# Patient Record
Sex: MALE | Race: WHITE | NOT HISPANIC OR LATINO | Employment: FULL TIME | URBAN - METROPOLITAN AREA
[De-identification: names, ages, dates, MRNs, and addresses within clinical notes are randomized per-mention and may not be internally consistent; named-entity substitution may affect disease eponyms.]

---

## 2020-12-23 ENCOUNTER — APPOINTMENT (OUTPATIENT)
Dept: CT IMAGING | Facility: HOSPITAL | Age: 40
End: 2020-12-23

## 2020-12-23 ENCOUNTER — HOSPITAL ENCOUNTER (EMERGENCY)
Facility: HOSPITAL | Age: 40
Discharge: HOME | End: 2020-12-23
Attending: EMERGENCY MEDICINE | Admitting: EMERGENCY MEDICINE

## 2020-12-23 VITALS
HEART RATE: 72 BPM | OXYGEN SATURATION: 96 % | WEIGHT: 236.55 LBS | RESPIRATION RATE: 16 BRPM | HEIGHT: 72 IN | DIASTOLIC BLOOD PRESSURE: 69 MMHG | TEMPERATURE: 97.8 F | SYSTOLIC BLOOD PRESSURE: 132 MMHG | BODY MASS INDEX: 32.04 KG/M2

## 2020-12-23 DIAGNOSIS — R10.9 FLANK PAIN: Primary | ICD-10-CM

## 2020-12-23 LAB
ALBUMIN SERPL-MCNC: 4.8 G/DL (ref 3.5–5.2)
ALBUMIN/GLOB SERPL: 2.2 G/DL
ALP SERPL-CCNC: 42 U/L (ref 39–117)
ALT SERPL W P-5'-P-CCNC: 16 U/L (ref 1–41)
ANION GAP SERPL CALCULATED.3IONS-SCNC: 10 MMOL/L (ref 5–15)
AST SERPL-CCNC: 14 U/L (ref 1–40)
BASOPHILS # BLD AUTO: 0 10*3/MM3 (ref 0–0.2)
BASOPHILS NFR BLD AUTO: 0.3 % (ref 0–1.5)
BILIRUB SERPL-MCNC: 0.8 MG/DL (ref 0–1.2)
BILIRUB UR QL STRIP: NEGATIVE
BUN SERPL-MCNC: 9 MG/DL (ref 6–20)
BUN/CREAT SERPL: 11 (ref 7–25)
CALCIUM SPEC-SCNC: 9.6 MG/DL (ref 8.6–10.5)
CHLORIDE SERPL-SCNC: 104 MMOL/L (ref 98–107)
CLARITY UR: CLEAR
CO2 SERPL-SCNC: 27 MMOL/L (ref 22–29)
COLOR UR: YELLOW
CREAT SERPL-MCNC: 0.82 MG/DL (ref 0.76–1.27)
DEPRECATED RDW RBC AUTO: 41.1 FL (ref 37–54)
EOSINOPHIL # BLD AUTO: 0 10*3/MM3 (ref 0–0.4)
EOSINOPHIL NFR BLD AUTO: 0.4 % (ref 0.3–6.2)
ERYTHROCYTE [DISTWIDTH] IN BLOOD BY AUTOMATED COUNT: 12.4 % (ref 12.3–15.4)
GFR SERPL CREATININE-BSD FRML MDRD: 105 ML/MIN/1.73
GLOBULIN UR ELPH-MCNC: 2.2 GM/DL
GLUCOSE SERPL-MCNC: 90 MG/DL (ref 65–99)
GLUCOSE UR STRIP-MCNC: NEGATIVE MG/DL
HCT VFR BLD AUTO: 45.5 % (ref 37.5–51)
HGB BLD-MCNC: 15.4 G/DL (ref 13–17.7)
HGB UR QL STRIP.AUTO: NEGATIVE
KETONES UR QL STRIP: NEGATIVE
LEUKOCYTE ESTERASE UR QL STRIP.AUTO: NEGATIVE
LYMPHOCYTES # BLD AUTO: 2.1 10*3/MM3 (ref 0.7–3.1)
LYMPHOCYTES NFR BLD AUTO: 21.3 % (ref 19.6–45.3)
MCH RBC QN AUTO: 32.8 PG (ref 26.6–33)
MCHC RBC AUTO-ENTMCNC: 33.8 G/DL (ref 31.5–35.7)
MCV RBC AUTO: 97.1 FL (ref 79–97)
MONOCYTES # BLD AUTO: 0.9 10*3/MM3 (ref 0.1–0.9)
MONOCYTES NFR BLD AUTO: 8.8 % (ref 5–12)
NEUTROPHILS NFR BLD AUTO: 6.8 10*3/MM3 (ref 1.7–7)
NEUTROPHILS NFR BLD AUTO: 69.2 % (ref 42.7–76)
NITRITE UR QL STRIP: NEGATIVE
NRBC BLD AUTO-RTO: 0.1 /100 WBC (ref 0–0.2)
PH UR STRIP.AUTO: 6.5 [PH] (ref 5–8)
PLATELET # BLD AUTO: 199 10*3/MM3 (ref 140–450)
PMV BLD AUTO: 8.8 FL (ref 6–12)
POTASSIUM SERPL-SCNC: 3.5 MMOL/L (ref 3.5–5.2)
PROT SERPL-MCNC: 7 G/DL (ref 6–8.5)
PROT UR QL STRIP: NEGATIVE
RBC # BLD AUTO: 4.69 10*6/MM3 (ref 4.14–5.8)
SODIUM SERPL-SCNC: 141 MMOL/L (ref 136–145)
SP GR UR STRIP: <=1.005 (ref 1–1.03)
UROBILINOGEN UR QL STRIP: NORMAL
WBC # BLD AUTO: 9.9 10*3/MM3 (ref 3.4–10.8)

## 2020-12-23 PROCEDURE — 25010000002 ONDANSETRON PER 1 MG: Performed by: EMERGENCY MEDICINE

## 2020-12-23 PROCEDURE — 25010000002 KETOROLAC TROMETHAMINE PER 15 MG: Performed by: EMERGENCY MEDICINE

## 2020-12-23 PROCEDURE — 96374 THER/PROPH/DIAG INJ IV PUSH: CPT

## 2020-12-23 PROCEDURE — 74176 CT ABD & PELVIS W/O CONTRAST: CPT

## 2020-12-23 PROCEDURE — 99283 EMERGENCY DEPT VISIT LOW MDM: CPT

## 2020-12-23 PROCEDURE — 81003 URINALYSIS AUTO W/O SCOPE: CPT | Performed by: EMERGENCY MEDICINE

## 2020-12-23 PROCEDURE — 85025 COMPLETE CBC W/AUTO DIFF WBC: CPT | Performed by: EMERGENCY MEDICINE

## 2020-12-23 PROCEDURE — 96375 TX/PRO/DX INJ NEW DRUG ADDON: CPT

## 2020-12-23 PROCEDURE — 80053 COMPREHEN METABOLIC PANEL: CPT | Performed by: EMERGENCY MEDICINE

## 2020-12-23 RX ORDER — ONDANSETRON 4 MG/1
4 TABLET, FILM COATED ORAL 4 TIMES DAILY
Qty: 10 TABLET | Refills: 0 | Status: SHIPPED | OUTPATIENT
Start: 2020-12-23

## 2020-12-23 RX ORDER — ONDANSETRON 2 MG/ML
4 INJECTION INTRAMUSCULAR; INTRAVENOUS ONCE
Status: COMPLETED | OUTPATIENT
Start: 2020-12-23 | End: 2020-12-23

## 2020-12-23 RX ORDER — KETOROLAC TROMETHAMINE 15 MG/ML
15 INJECTION, SOLUTION INTRAMUSCULAR; INTRAVENOUS ONCE
Status: COMPLETED | OUTPATIENT
Start: 2020-12-23 | End: 2020-12-23

## 2020-12-23 RX ORDER — MELOXICAM 15 MG/1
15 TABLET ORAL DAILY
Qty: 10 TABLET | Refills: 0 | Status: SHIPPED | OUTPATIENT
Start: 2020-12-23

## 2020-12-23 RX ORDER — SODIUM CHLORIDE 0.9 % (FLUSH) 0.9 %
10 SYRINGE (ML) INJECTION AS NEEDED
Status: DISCONTINUED | OUTPATIENT
Start: 2020-12-23 | End: 2020-12-23 | Stop reason: HOSPADM

## 2020-12-23 RX ADMIN — ONDANSETRON 4 MG: 2 INJECTION, SOLUTION INTRAMUSCULAR; INTRAVENOUS at 17:45

## 2020-12-23 RX ADMIN — KETOROLAC TROMETHAMINE 15 MG: 15 INJECTION, SOLUTION INTRAMUSCULAR; INTRAVENOUS at 17:45

## 2020-12-23 NOTE — ED PROVIDER NOTES
Subjective   History of Present Illness  Context: 40-year-old male presents with complaints of flank pain.  He states he has been having trouble for a couple weeks.  Has had some nausea but no vomiting.  He states he has had some difficulty with urination has been hard to go.  He has had no fever.  No cough or shortness of breath.  He was recently started back on Lexapro for anxiety.  He is having no cough or shortness of breath.     Location: Flank  Quality: Pain  Duration: 2 weeks  Timing: Constant  Severity: Severe today   Modifying factors: Feels better if he is standing  Associated signs and symptoms: States was hard to urinate    Review of Systems  Negative for fever cough shortness of breath  No past medical history on file.  DVT approximately 3 years ago anxiety  No Known Allergies    No past surgical history on file.    No family history on file.    Social History     Socioeconomic History   • Marital status:      Spouse name: Not on file   • Number of children: Not on file   • Years of education: Not on file   • Highest education level: Not on file     Current medication Lexapro      Objective   Physical Exam  40 year-old male awake alert.  Generally well-developed well-nourished.  Pupils equal round react light.  Neck supple.  Chest clear equal breath sounds cardiovascular regular rate and rhythm he complains of bilateral CVA tenderness.  Abdomen is soft without mass rebound or guarding.  Legs without tenderness edema.  He is ambulating without difficulty.  Procedures           ED Course      Results for orders placed or performed during the hospital encounter of 12/23/20   Comprehensive Metabolic Panel    Specimen: Blood   Result Value Ref Range    Glucose 90 65 - 99 mg/dL    BUN 9 6 - 20 mg/dL    Creatinine 0.82 0.76 - 1.27 mg/dL    Sodium 141 136 - 145 mmol/L    Potassium 3.5 3.5 - 5.2 mmol/L    Chloride 104 98 - 107 mmol/L    CO2 27.0 22.0 - 29.0 mmol/L    Calcium 9.6 8.6 - 10.5 mg/dL    Total  Protein 7.0 6.0 - 8.5 g/dL    Albumin 4.80 3.50 - 5.20 g/dL    ALT (SGPT) 16 1 - 41 U/L    AST (SGOT) 14 1 - 40 U/L    Alkaline Phosphatase 42 39 - 117 U/L    Total Bilirubin 0.8 0.0 - 1.2 mg/dL    eGFR Non African Amer 105 >60 mL/min/1.73    Globulin 2.2 gm/dL    A/G Ratio 2.2 g/dL    BUN/Creatinine Ratio 11.0 7.0 - 25.0    Anion Gap 10.0 5.0 - 15.0 mmol/L   Urinalysis With Microscopic If Indicated (No Culture) - Urine, Clean Catch    Specimen: Urine, Clean Catch   Result Value Ref Range    Color, UA Yellow Yellow, Straw    Appearance, UA Clear Clear    pH, UA 6.5 5.0 - 8.0    Specific Gravity, UA <=1.005 1.005 - 1.030    Glucose, UA Negative Negative    Ketones, UA Negative Negative    Bilirubin, UA Negative Negative    Blood, UA Negative Negative    Protein, UA Negative Negative    Leuk Esterase, UA Negative Negative    Nitrite, UA Negative Negative    Urobilinogen, UA 0.2 E.U./dL 0.2 - 1.0 E.U./dL   CBC Auto Differential    Specimen: Blood   Result Value Ref Range    WBC 9.90 3.40 - 10.80 10*3/mm3    RBC 4.69 4.14 - 5.80 10*6/mm3    Hemoglobin 15.4 13.0 - 17.7 g/dL    Hematocrit 45.5 37.5 - 51.0 %    MCV 97.1 (H) 79.0 - 97.0 fL    MCH 32.8 26.6 - 33.0 pg    MCHC 33.8 31.5 - 35.7 g/dL    RDW 12.4 12.3 - 15.4 %    RDW-SD 41.1 37.0 - 54.0 fl    MPV 8.8 6.0 - 12.0 fL    Platelets 199 140 - 450 10*3/mm3    Neutrophil % 69.2 42.7 - 76.0 %    Lymphocyte % 21.3 19.6 - 45.3 %    Monocyte % 8.8 5.0 - 12.0 %    Eosinophil % 0.4 0.3 - 6.2 %    Basophil % 0.3 0.0 - 1.5 %    Neutrophils, Absolute 6.80 1.70 - 7.00 10*3/mm3    Lymphocytes, Absolute 2.10 0.70 - 3.10 10*3/mm3    Monocytes, Absolute 0.90 0.10 - 0.90 10*3/mm3    Eosinophils, Absolute 0.00 0.00 - 0.40 10*3/mm3    Basophils, Absolute 0.00 0.00 - 0.20 10*3/mm3    nRBC 0.1 0.0 - 0.2 /100 WBC     Ct Abdomen Pelvis Without Contrast    Result Date: 12/23/2020  Normal CT of the abdomen and pelvis.    Electronically Signed By-Emiliano Carlson MD On:12/23/2020 7:26 PM This  "report was finalized on [id number] by  Emiliano Carlson MD.    Medications   sodium chloride 0.9 % flush 10 mL (has no administration in time range)   ondansetron (ZOFRAN) injection 4 mg (4 mg Intravenous Given 12/23/20 1745)   ketorolac (TORADOL) injection 15 mg (15 mg Intravenous Given 12/23/20 1745)     /69   Pulse 72   Temp 97.8 °F (36.6 °C) (Oral)   Resp 16   Ht 182.9 cm (72\")   Wt 107 kg (236 lb 8.9 oz)   SpO2 96%   BMI 32.08 kg/m²                                        MDM  Chart review: Patient was evaluated at immediate care on the 14th of this month for complaints of abdominal pain sharp in nature for approximately month.  She reported at that time some shortness of breath and some left-sided chest pain.  Also some upper abdominal pain for 2 weeks.  He was referred to [facility] ER at that time for further evaluation.  Comorbidity: As per past history  Differential: Musculoskeletal pain, UTI, renal colic,  My EKG interpretation:   Lab: Normal urinalysis normal comprehensive metabolic panel, normal CBC with exception of MCV of 97.1  Radiology: I reviewed CT scan of abdomen pelvis.  This was found to be normal.  Discussion/treatment: Patient IV placed.  He was given Zofran for nausea Toradol for pain.  Patient's findings were discussed with him.  He reports he has seen his PMD also.  He was noted to have had recent evaluation at [facility] on the 14th had negative CT chest PE protocol.  He had unremarkable laboratory evaluation at that time also.  Findings were discussed with him.  He was discharged.  Placed on Mobic.  He was given Zofran for nausea.  Encouraged to follow-up with his PMD next week for repeat evaluation.  Patient was evaluated using appropriate PPE      Final diagnoses:   Flank pain            Minor Morel MD  12/23/20 1954    "

## 2021-01-11 ENCOUNTER — OFFICE (AMBULATORY)
Dept: URBAN - METROPOLITAN AREA CLINIC 64 | Facility: CLINIC | Age: 41
End: 2021-01-11

## 2021-01-11 ENCOUNTER — TRANSCRIBE ORDERS (OUTPATIENT)
Dept: ADMINISTRATIVE | Facility: HOSPITAL | Age: 41
End: 2021-01-11

## 2021-01-11 VITALS — WEIGHT: 234 LBS | HEIGHT: 72 IN | SYSTOLIC BLOOD PRESSURE: 139 MMHG | DIASTOLIC BLOOD PRESSURE: 82 MMHG

## 2021-01-11 DIAGNOSIS — M54.6 PAIN IN THORACIC SPINE: ICD-10-CM

## 2021-01-11 DIAGNOSIS — R63.4 ABNORMAL WEIGHT LOSS: ICD-10-CM

## 2021-01-11 DIAGNOSIS — R19.4 CHANGE IN BOWEL HABIT: ICD-10-CM

## 2021-01-11 DIAGNOSIS — R10.11 RIGHT UPPER QUADRANT PAIN: Primary | ICD-10-CM

## 2021-01-11 DIAGNOSIS — R10.11 RIGHT UPPER QUADRANT PAIN: ICD-10-CM

## 2021-01-11 PROCEDURE — 99204 OFFICE O/P NEW MOD 45 MIN: CPT | Performed by: INTERNAL MEDICINE

## 2021-01-14 ENCOUNTER — ON CAMPUS - OUTPATIENT (AMBULATORY)
Dept: URBAN - METROPOLITAN AREA HOSPITAL 2 | Facility: HOSPITAL | Age: 41
End: 2021-01-14
Payer: COMMERCIAL

## 2021-01-14 ENCOUNTER — OFFICE (AMBULATORY)
Dept: URBAN - METROPOLITAN AREA PATHOLOGY 4 | Facility: PATHOLOGY | Age: 41
End: 2021-01-14

## 2021-01-14 VITALS
TEMPERATURE: 98.2 F | SYSTOLIC BLOOD PRESSURE: 118 MMHG | SYSTOLIC BLOOD PRESSURE: 156 MMHG | SYSTOLIC BLOOD PRESSURE: 122 MMHG | OXYGEN SATURATION: 100 % | HEART RATE: 64 BPM | SYSTOLIC BLOOD PRESSURE: 111 MMHG | DIASTOLIC BLOOD PRESSURE: 107 MMHG | SYSTOLIC BLOOD PRESSURE: 107 MMHG | DIASTOLIC BLOOD PRESSURE: 76 MMHG | HEART RATE: 65 BPM | OXYGEN SATURATION: 96 % | HEART RATE: 88 BPM | DIASTOLIC BLOOD PRESSURE: 46 MMHG | RESPIRATION RATE: 16 BRPM | WEIGHT: 229 LBS | SYSTOLIC BLOOD PRESSURE: 130 MMHG | SYSTOLIC BLOOD PRESSURE: 146 MMHG | HEART RATE: 67 BPM | RESPIRATION RATE: 18 BRPM | HEART RATE: 55 BPM | OXYGEN SATURATION: 99 % | OXYGEN SATURATION: 97 % | HEIGHT: 72 IN | DIASTOLIC BLOOD PRESSURE: 86 MMHG | OXYGEN SATURATION: 98 % | DIASTOLIC BLOOD PRESSURE: 67 MMHG | DIASTOLIC BLOOD PRESSURE: 70 MMHG | DIASTOLIC BLOOD PRESSURE: 72 MMHG | HEART RATE: 63 BPM

## 2021-01-14 DIAGNOSIS — R63.4 ABNORMAL WEIGHT LOSS: ICD-10-CM

## 2021-01-14 DIAGNOSIS — R19.4 CHANGE IN BOWEL HABIT: ICD-10-CM

## 2021-01-14 DIAGNOSIS — K21.00 GASTRO-ESOPHAGEAL REFLUX DISEASE WITH ESOPHAGITIS, WITHOUT B: ICD-10-CM

## 2021-01-14 DIAGNOSIS — K64.0 FIRST DEGREE HEMORRHOIDS: ICD-10-CM

## 2021-01-14 DIAGNOSIS — K29.60 OTHER GASTRITIS WITHOUT BLEEDING: ICD-10-CM

## 2021-01-14 DIAGNOSIS — M54.6 PAIN IN THORACIC SPINE: ICD-10-CM

## 2021-01-14 DIAGNOSIS — R10.11 RIGHT UPPER QUADRANT PAIN: ICD-10-CM

## 2021-01-14 DIAGNOSIS — K31.89 OTHER DISEASES OF STOMACH AND DUODENUM: ICD-10-CM

## 2021-01-14 PROBLEM — K20.80 OTHER ESOPHAGITIS WITHOUT BLEEDING: Status: ACTIVE | Noted: 2021-01-14

## 2021-01-14 PROBLEM — K22.8 OTHER SPECIFIED DISEASES OF ESOPHAGUS: Status: ACTIVE | Noted: 2021-01-14

## 2021-01-14 LAB
GI HISTOLOGY: A. UNSPECIFIED: (no result)
GI HISTOLOGY: B. SELECT: (no result)
GI HISTOLOGY: C. SELECT: (no result)
GI HISTOLOGY: D. UNSPECIFIED: (no result)
GI HISTOLOGY: PDF REPORT: (no result)

## 2021-01-14 PROCEDURE — 88305 TISSUE EXAM BY PATHOLOGIST: CPT | Performed by: INTERNAL MEDICINE

## 2021-01-14 PROCEDURE — 43239 EGD BIOPSY SINGLE/MULTIPLE: CPT | Performed by: INTERNAL MEDICINE

## 2021-01-14 PROCEDURE — 45380 COLONOSCOPY AND BIOPSY: CPT | Performed by: INTERNAL MEDICINE

## 2021-01-21 ENCOUNTER — TRANSCRIBE ORDERS (OUTPATIENT)
Dept: ADMINISTRATIVE | Facility: HOSPITAL | Age: 41
End: 2021-01-21

## 2021-01-21 DIAGNOSIS — R63.4 ABNORMAL WEIGHT LOSS: ICD-10-CM

## 2021-01-21 DIAGNOSIS — R10.11 ABDOMINAL PAIN, RIGHT UPPER QUADRANT: Primary | ICD-10-CM

## 2021-01-21 DIAGNOSIS — R19.4 CHANGE IN BOWEL HABITS: ICD-10-CM

## 2021-01-21 DIAGNOSIS — M54.6 THORACIC SPINE PAIN: ICD-10-CM

## 2021-01-27 ENCOUNTER — HOSPITAL ENCOUNTER (OUTPATIENT)
Dept: NUCLEAR MEDICINE | Facility: HOSPITAL | Age: 41
Discharge: HOME | End: 2021-01-27

## 2021-01-27 DIAGNOSIS — R63.4 ABNORMAL WEIGHT LOSS: ICD-10-CM

## 2021-01-27 DIAGNOSIS — R10.11 RIGHT UPPER QUADRANT PAIN: ICD-10-CM

## 2021-01-27 PROCEDURE — A9537 TC99M MEBROFENIN: HCPCS | Performed by: INTERNAL MEDICINE

## 2021-01-27 PROCEDURE — 78227 HEPATOBIL SYST IMAGE W/DRUG: CPT

## 2021-01-27 PROCEDURE — 0 TECHNETIUM TC 99M MEBROFENIN KIT: Performed by: INTERNAL MEDICINE

## 2021-01-27 RX ORDER — KIT FOR THE PREPARATION OF TECHNETIUM TC 99M MEBROFENIN 45 MG/10ML
1 INJECTION, POWDER, LYOPHILIZED, FOR SOLUTION INTRAVENOUS
Status: COMPLETED | OUTPATIENT
Start: 2021-01-27 | End: 2021-01-27

## 2021-01-27 RX ADMIN — MEBROFENIN 1 DOSE: 45 INJECTION, POWDER, LYOPHILIZED, FOR SOLUTION INTRAVENOUS at 07:45

## 2021-02-10 ENCOUNTER — OFFICE (AMBULATORY)
Dept: URBAN - METROPOLITAN AREA CLINIC 64 | Facility: CLINIC | Age: 41
End: 2021-02-10

## 2021-02-10 VITALS
HEART RATE: 49 BPM | WEIGHT: 259 LBS | DIASTOLIC BLOOD PRESSURE: 72 MMHG | SYSTOLIC BLOOD PRESSURE: 129 MMHG | HEIGHT: 72 IN

## 2021-02-10 DIAGNOSIS — R19.4 CHANGE IN BOWEL HABIT: ICD-10-CM

## 2021-02-10 DIAGNOSIS — R63.4 ABNORMAL WEIGHT LOSS: ICD-10-CM

## 2021-02-10 DIAGNOSIS — K29.70 GASTRITIS, UNSPECIFIED, WITHOUT BLEEDING: ICD-10-CM

## 2021-02-10 DIAGNOSIS — K21.00 GASTRO-ESOPHAGEAL REFLUX DISEASE WITH ESOPHAGITIS, WITHOUT B: ICD-10-CM

## 2021-02-10 PROCEDURE — 99213 OFFICE O/P EST LOW 20 MIN: CPT | Performed by: NURSE PRACTITIONER

## 2024-04-12 ENCOUNTER — HOSPITAL ENCOUNTER (EMERGENCY)
Facility: HOSPITAL | Age: 44
Discharge: HOME | End: 2024-04-12
Attending: EMERGENCY MEDICINE
Payer: COMMERCIAL

## 2024-04-12 ENCOUNTER — APPOINTMENT (OUTPATIENT)
Dept: CT IMAGING | Facility: HOSPITAL | Age: 44
End: 2024-04-12
Payer: COMMERCIAL

## 2024-04-12 VITALS
BODY MASS INDEX: 32.83 KG/M2 | HEIGHT: 71 IN | HEART RATE: 87 BPM | DIASTOLIC BLOOD PRESSURE: 79 MMHG | RESPIRATION RATE: 18 BRPM | TEMPERATURE: 98.8 F | SYSTOLIC BLOOD PRESSURE: 148 MMHG | OXYGEN SATURATION: 100 % | WEIGHT: 234.5 LBS

## 2024-04-12 DIAGNOSIS — R10.32 LEFT LOWER QUADRANT ABDOMINAL PAIN: Primary | ICD-10-CM

## 2024-04-12 LAB
BILIRUB UR QL STRIP: NEGATIVE
CLARITY UR: CLEAR
COLOR UR: YELLOW
GLUCOSE UR STRIP-MCNC: NEGATIVE MG/DL
HGB UR QL STRIP.AUTO: NEGATIVE
KETONES UR QL STRIP: NEGATIVE
LEUKOCYTE ESTERASE UR QL STRIP.AUTO: NEGATIVE
NITRITE UR QL STRIP: NEGATIVE
PH UR STRIP.AUTO: 5.5 [PH] (ref 5–8)
PROT UR QL STRIP: NEGATIVE
SP GR UR STRIP: 1.02 (ref 1–1.03)
UROBILINOGEN UR QL STRIP: NORMAL

## 2024-04-12 PROCEDURE — 81003 URINALYSIS AUTO W/O SCOPE: CPT | Performed by: EMERGENCY MEDICINE

## 2024-04-12 PROCEDURE — 25010000002 KETOROLAC TROMETHAMINE PER 15 MG: Performed by: EMERGENCY MEDICINE

## 2024-04-12 PROCEDURE — 99284 EMERGENCY DEPT VISIT MOD MDM: CPT

## 2024-04-12 PROCEDURE — 74176 CT ABD & PELVIS W/O CONTRAST: CPT

## 2024-04-12 PROCEDURE — 99283 EMERGENCY DEPT VISIT LOW MDM: CPT | Performed by: EMERGENCY MEDICINE

## 2024-04-12 PROCEDURE — 96372 THER/PROPH/DIAG INJ SC/IM: CPT

## 2024-04-12 RX ORDER — KETOROLAC TROMETHAMINE 30 MG/ML
30 INJECTION, SOLUTION INTRAMUSCULAR; INTRAVENOUS ONCE
Status: COMPLETED | OUTPATIENT
Start: 2024-04-12 | End: 2024-04-12

## 2024-04-12 RX ADMIN — KETOROLAC TROMETHAMINE 30 MG: 30 INJECTION, SOLUTION INTRAMUSCULAR at 17:10

## 2024-04-12 NOTE — FSED PROVIDER NOTE
Subjective   History of Present Illness  44-year-old male complains of left lower quadrant pain x 1 week says he has had some blood in the urine intermittently, says the pain feels sharp like it stabbing in the left lower quadrant.  Earlier in the week he had some pain in the right flank but no right-sided abdominal pain.  No fevers or chills no significant pain currently.  Review of Systems   Gastrointestinal:  Positive for abdominal pain.   Genitourinary:  Positive for flank pain and hematuria.       History reviewed. No pertinent past medical history.    No Known Allergies    History reviewed. No pertinent surgical history.    History reviewed. No pertinent family history.    Social History     Socioeconomic History    Marital status:    Tobacco Use    Smoking status: Some Days     Types: Cigars   Vaping Use    Vaping status: Never Used   Substance and Sexual Activity    Alcohol use: Not Currently    Drug use: Yes     Types: Marijuana     Comment: occ    Sexual activity: Defer           Objective   Physical Exam  Nursing note reviewed.  INITIAL VITAL SIGNS: Reviewed by me.  Pulse ox normal  GENERAL: Alert and interactive. No acute distress.  HEAD: Head is normocephalic.  RESPIRATORY: No tachypnea.   CV: Regular rate and rhythm. No murmurs. No rubs or gallops.  ABDOMEN: Soft, nondistended, nontender. No guarding. No rebound. No masses.   BACK:  No obvious deformity.  No CVA tenderness to percussion  EXTREMITIES: No deformity. No clubbing or cyanosis. No edema.   SKIN: Warm and dry. No diaphoresis. No obvious rashes.   NEUROLOGIC: Alert and oriented. Face is symmetric. Speech is normal. Moves all extremities equally. Motor and sensory distally intact.       Procedures           ED Course                                           Medical Decision Making  Amount and/or Complexity of Data Reviewed  Radiology: ordered.    Risk  Prescription drug management.    Patient with some left lower quadrant abdominal  pain.  No fevers no chills no nausea vomiting no constipation or diarrhea noted dysuria but has had some hematuria.  Says it feels sharp and sometimes radiates towards the penis or testicles.  On exam he is in no distress has soft nontender abdomen no CVA tenderness.  Urinalysis is normal and CT scan shows normal prostate no evidence for stone.  Discussed findings with patient, he is feeling somewhat better after Toradol will discharge with primary follow-up.    Final diagnoses:   Left lower quadrant abdominal pain       ED Disposition  ED Disposition       ED Disposition   Discharge    Condition   Good    Comment   --               Head, Tom Benavidez MD  [address] [place] [address] [place] [facility]  [phone]    Call   To schedule follow-up appointment         Medication List      No changes were made to your prescriptions during this visit.

## 2024-04-12 NOTE — DISCHARGE INSTRUCTIONS
Take ibuprofen and Tylenol according to label instructions.  Drink plenty of fluids.  Follow-up with your doctor.  Return to the emergency room if you have worsening symptoms.

## 2024-07-31 ENCOUNTER — OFFICE (AMBULATORY)
Dept: URBAN - METROPOLITAN AREA CLINIC 64 | Facility: CLINIC | Age: 44
End: 2024-07-31

## 2024-07-31 VITALS
HEIGHT: 72 IN | SYSTOLIC BLOOD PRESSURE: 121 MMHG | HEART RATE: 65 BPM | WEIGHT: 213 LBS | DIASTOLIC BLOOD PRESSURE: 76 MMHG

## 2024-07-31 DIAGNOSIS — R11.0 NAUSEA: ICD-10-CM

## 2024-07-31 DIAGNOSIS — R63.4 ABNORMAL WEIGHT LOSS: ICD-10-CM

## 2024-07-31 DIAGNOSIS — K62.5 HEMORRHAGE OF ANUS AND RECTUM: ICD-10-CM

## 2024-07-31 DIAGNOSIS — R07.9 CHEST PAIN, UNSPECIFIED: ICD-10-CM

## 2024-07-31 PROCEDURE — 99214 OFFICE O/P EST MOD 30 MIN: CPT | Performed by: NURSE PRACTITIONER

## 2024-08-10 ENCOUNTER — APPOINTMENT (OUTPATIENT)
Dept: CT IMAGING | Facility: HOSPITAL | Age: 44
End: 2024-08-10
Payer: COMMERCIAL

## 2024-08-10 ENCOUNTER — APPOINTMENT (OUTPATIENT)
Dept: GENERAL RADIOLOGY | Facility: HOSPITAL | Age: 44
End: 2024-08-10
Payer: COMMERCIAL

## 2024-08-10 ENCOUNTER — HOSPITAL ENCOUNTER (OUTPATIENT)
Facility: HOSPITAL | Age: 44
Discharge: HOME | End: 2024-08-13
Attending: EMERGENCY MEDICINE | Admitting: INTERNAL MEDICINE
Payer: COMMERCIAL

## 2024-08-10 DIAGNOSIS — R06.00 DYSPNEA, UNSPECIFIED TYPE: Primary | ICD-10-CM

## 2024-08-10 DIAGNOSIS — K21.9 GASTROESOPHAGEAL REFLUX DISEASE WITHOUT ESOPHAGITIS: ICD-10-CM

## 2024-08-10 DIAGNOSIS — I26.99 OTHER ACUTE PULMONARY EMBOLISM WITHOUT ACUTE COR PULMONALE: ICD-10-CM

## 2024-08-10 LAB
ALBUMIN SERPL-MCNC: 4.5 G/DL (ref 3.5–5.2)
ALBUMIN/GLOB SERPL: 1.9 G/DL
ALP SERPL-CCNC: 44 U/L (ref 39–117)
ALT SERPL W P-5'-P-CCNC: 20 U/L (ref 1–41)
ANION GAP SERPL CALCULATED.3IONS-SCNC: 11 MMOL/L (ref 5–15)
ANION GAP SERPL CALCULATED.3IONS-SCNC: 9.8 MMOL/L (ref 5–15)
APTT PPP: 26.8 SECONDS (ref 61–76.5)
AST SERPL-CCNC: 21 U/L (ref 1–40)
BASOPHILS # BLD AUTO: 0.03 10*3/MM3 (ref 0–0.2)
BASOPHILS NFR BLD AUTO: 0.4 % (ref 0–1.5)
BILIRUB SERPL-MCNC: 1 MG/DL (ref 0–1.2)
BUN SERPL-MCNC: 15 MG/DL (ref 6–20)
BUN SERPL-MCNC: 17 MG/DL (ref 6–20)
BUN/CREAT SERPL: 17 (ref 7–25)
BUN/CREAT SERPL: 17.5 (ref 7–25)
CALCIUM SPEC-SCNC: 9.4 MG/DL (ref 8.6–10.5)
CALCIUM SPEC-SCNC: 9.7 MG/DL (ref 8.6–10.5)
CHLORIDE SERPL-SCNC: 102 MMOL/L (ref 98–107)
CHLORIDE SERPL-SCNC: 104 MMOL/L (ref 98–107)
CO2 SERPL-SCNC: 25 MMOL/L (ref 22–29)
CO2 SERPL-SCNC: 25.2 MMOL/L (ref 22–29)
CREAT SERPL-MCNC: 0.88 MG/DL (ref 0.76–1.27)
CREAT SERPL-MCNC: 0.97 MG/DL (ref 0.76–1.27)
DEPRECATED RDW RBC AUTO: 42.5 FL (ref 37–54)
DEPRECATED RDW RBC AUTO: 42.8 FL (ref 37–54)
EGFRCR SERPLBLD CKD-EPI 2021: 100 ML/MIN/1.73
EGFRCR SERPLBLD CKD-EPI 2021: 110.1 ML/MIN/1.73
EOSINOPHIL # BLD AUTO: 0.08 10*3/MM3 (ref 0–0.4)
EOSINOPHIL NFR BLD AUTO: 1.1 % (ref 0.3–6.2)
ERYTHROCYTE [DISTWIDTH] IN BLOOD BY AUTOMATED COUNT: 11.6 % (ref 12.3–15.4)
ERYTHROCYTE [DISTWIDTH] IN BLOOD BY AUTOMATED COUNT: 11.7 % (ref 12.3–15.4)
GEN 5 2HR TROPONIN T REFLEX: 7 NG/L
GLOBULIN UR ELPH-MCNC: 2.4 GM/DL
GLUCOSE SERPL-MCNC: 191 MG/DL (ref 65–99)
GLUCOSE SERPL-MCNC: 93 MG/DL (ref 65–99)
HCT VFR BLD AUTO: 44.7 % (ref 37.5–51)
HCT VFR BLD AUTO: 45.7 % (ref 37.5–51)
HGB BLD-MCNC: 14.9 G/DL (ref 13–17.7)
HGB BLD-MCNC: 15.2 G/DL (ref 13–17.7)
IMM GRANULOCYTES # BLD AUTO: 0.02 10*3/MM3 (ref 0–0.05)
IMM GRANULOCYTES NFR BLD AUTO: 0.3 % (ref 0–0.5)
INR PPP: 0.96 (ref 0.93–1.1)
LYMPHOCYTES # BLD AUTO: 1.87 10*3/MM3 (ref 0.7–3.1)
LYMPHOCYTES NFR BLD AUTO: 25.2 % (ref 19.6–45.3)
MAGNESIUM SERPL-MCNC: 2.2 MG/DL (ref 1.6–2.6)
MCH RBC QN AUTO: 32.8 PG (ref 26.6–33)
MCH RBC QN AUTO: 33.1 PG (ref 26.6–33)
MCHC RBC AUTO-ENTMCNC: 33.3 G/DL (ref 31.5–35.7)
MCHC RBC AUTO-ENTMCNC: 33.3 G/DL (ref 31.5–35.7)
MCV RBC AUTO: 98.7 FL (ref 79–97)
MCV RBC AUTO: 99.3 FL (ref 79–97)
MONOCYTES # BLD AUTO: 0.69 10*3/MM3 (ref 0.1–0.9)
MONOCYTES NFR BLD AUTO: 9.3 % (ref 5–12)
NEUTROPHILS NFR BLD AUTO: 4.73 10*3/MM3 (ref 1.7–7)
NEUTROPHILS NFR BLD AUTO: 63.7 % (ref 42.7–76)
NRBC BLD AUTO-RTO: 0 /100 WBC (ref 0–0.2)
NT-PROBNP SERPL-MCNC: <36 PG/ML (ref 0–450)
PLATELET # BLD AUTO: 209 10*3/MM3 (ref 140–450)
PLATELET # BLD AUTO: 235 10*3/MM3 (ref 140–450)
PMV BLD AUTO: 10 FL (ref 6–12)
PMV BLD AUTO: 10 FL (ref 6–12)
POTASSIUM SERPL-SCNC: 4.4 MMOL/L (ref 3.5–5.2)
POTASSIUM SERPL-SCNC: 4.5 MMOL/L (ref 3.5–5.2)
PROT SERPL-MCNC: 6.9 G/DL (ref 6–8.5)
PROTHROMBIN TIME: 10.5 SECONDS (ref 9.6–11.7)
RBC # BLD AUTO: 4.5 10*6/MM3 (ref 4.14–5.8)
RBC # BLD AUTO: 4.63 10*6/MM3 (ref 4.14–5.8)
SODIUM SERPL-SCNC: 138 MMOL/L (ref 136–145)
SODIUM SERPL-SCNC: 139 MMOL/L (ref 136–145)
TROPONIN T DELTA: 0 NG/L
TROPONIN T SERPL HS-MCNC: 7 NG/L
WBC NRBC COR # BLD AUTO: 7.42 10*3/MM3 (ref 3.4–10.8)
WBC NRBC COR # BLD AUTO: 9.16 10*3/MM3 (ref 3.4–10.8)

## 2024-08-10 PROCEDURE — 85025 COMPLETE CBC W/AUTO DIFF WBC: CPT | Performed by: EMERGENCY MEDICINE

## 2024-08-10 PROCEDURE — G0378 HOSPITAL OBSERVATION PER HR: HCPCS

## 2024-08-10 PROCEDURE — 80053 COMPREHEN METABOLIC PANEL: CPT | Performed by: EMERGENCY MEDICINE

## 2024-08-10 PROCEDURE — 71275 CT ANGIOGRAPHY CHEST: CPT

## 2024-08-10 PROCEDURE — 25010000002 ENOXAPARIN PER 10 MG

## 2024-08-10 PROCEDURE — 84484 ASSAY OF TROPONIN QUANT: CPT | Performed by: EMERGENCY MEDICINE

## 2024-08-10 PROCEDURE — 94664 DEMO&/EVAL PT USE INHALER: CPT

## 2024-08-10 PROCEDURE — 94761 N-INVAS EAR/PLS OXIMETRY MLT: CPT

## 2024-08-10 PROCEDURE — 83880 ASSAY OF NATRIURETIC PEPTIDE: CPT | Performed by: EMERGENCY MEDICINE

## 2024-08-10 PROCEDURE — 94640 AIRWAY INHALATION TREATMENT: CPT

## 2024-08-10 PROCEDURE — 94799 UNLISTED PULMONARY SVC/PX: CPT

## 2024-08-10 PROCEDURE — 85027 COMPLETE CBC AUTOMATED: CPT

## 2024-08-10 PROCEDURE — 96374 THER/PROPH/DIAG INJ IV PUSH: CPT

## 2024-08-10 PROCEDURE — 96372 THER/PROPH/DIAG INJ SC/IM: CPT

## 2024-08-10 PROCEDURE — 25510000001 IOPAMIDOL PER 1 ML: Performed by: EMERGENCY MEDICINE

## 2024-08-10 PROCEDURE — 83735 ASSAY OF MAGNESIUM: CPT

## 2024-08-10 PROCEDURE — 85730 THROMBOPLASTIN TIME PARTIAL: CPT | Performed by: EMERGENCY MEDICINE

## 2024-08-10 PROCEDURE — 99285 EMERGENCY DEPT VISIT HI MDM: CPT

## 2024-08-10 PROCEDURE — 25010000002 METHYLPREDNISOLONE PER 125 MG: Performed by: EMERGENCY MEDICINE

## 2024-08-10 PROCEDURE — 71045 X-RAY EXAM CHEST 1 VIEW: CPT

## 2024-08-10 PROCEDURE — 85610 PROTHROMBIN TIME: CPT | Performed by: EMERGENCY MEDICINE

## 2024-08-10 PROCEDURE — 36415 COLL VENOUS BLD VENIPUNCTURE: CPT

## 2024-08-10 RX ORDER — ESCITALOPRAM OXALATE 10 MG/1
10 TABLET ORAL DAILY
Status: DISCONTINUED | OUTPATIENT
Start: 2024-08-11 | End: 2024-08-13 | Stop reason: HOSPADM

## 2024-08-10 RX ORDER — SODIUM CHLORIDE 0.9 % (FLUSH) 0.9 %
10 SYRINGE (ML) INJECTION AS NEEDED
Status: DISCONTINUED | OUTPATIENT
Start: 2024-08-10 | End: 2024-08-10

## 2024-08-10 RX ORDER — IPRATROPIUM BROMIDE AND ALBUTEROL SULFATE 2.5; .5 MG/3ML; MG/3ML
3 SOLUTION RESPIRATORY (INHALATION) ONCE
Status: COMPLETED | OUTPATIENT
Start: 2024-08-10 | End: 2024-08-10

## 2024-08-10 RX ORDER — SODIUM CHLORIDE 9 MG/ML
40 INJECTION, SOLUTION INTRAVENOUS AS NEEDED
Status: DISCONTINUED | OUTPATIENT
Start: 2024-08-10 | End: 2024-08-13 | Stop reason: HOSPADM

## 2024-08-10 RX ORDER — SODIUM CHLORIDE 0.9 % (FLUSH) 0.9 %
10 SYRINGE (ML) INJECTION EVERY 12 HOURS SCHEDULED
Status: DISCONTINUED | OUTPATIENT
Start: 2024-08-10 | End: 2024-08-13 | Stop reason: HOSPADM

## 2024-08-10 RX ORDER — METHYLPREDNISOLONE SODIUM SUCCINATE 125 MG/2ML
125 INJECTION, POWDER, LYOPHILIZED, FOR SOLUTION INTRAMUSCULAR; INTRAVENOUS ONCE
Status: COMPLETED | OUTPATIENT
Start: 2024-08-10 | End: 2024-08-10

## 2024-08-10 RX ORDER — ESCITALOPRAM OXALATE 10 MG/1
10 TABLET ORAL DAILY
COMMUNITY

## 2024-08-10 RX ORDER — ENOXAPARIN SODIUM 150 MG/ML
1 INJECTION SUBCUTANEOUS EVERY 12 HOURS
Status: DISCONTINUED | OUTPATIENT
Start: 2024-08-10 | End: 2024-08-11

## 2024-08-10 RX ORDER — PANTOPRAZOLE SODIUM 40 MG/1
40 TABLET, DELAYED RELEASE ORAL DAILY
Status: DISCONTINUED | OUTPATIENT
Start: 2024-08-11 | End: 2024-08-11

## 2024-08-10 RX ORDER — PANTOPRAZOLE SODIUM 40 MG/1
40 TABLET, DELAYED RELEASE ORAL DAILY
COMMUNITY
End: 2024-08-13 | Stop reason: HOSPADM

## 2024-08-10 RX ORDER — SODIUM CHLORIDE 0.9 % (FLUSH) 0.9 %
10 SYRINGE (ML) INJECTION AS NEEDED
Status: DISCONTINUED | OUTPATIENT
Start: 2024-08-10 | End: 2024-08-13 | Stop reason: HOSPADM

## 2024-08-10 RX ADMIN — IOPAMIDOL 100 ML: 755 INJECTION, SOLUTION INTRAVENOUS at 13:32

## 2024-08-10 RX ADMIN — Medication 10 ML: at 21:53

## 2024-08-10 RX ADMIN — ENOXAPARIN SODIUM 105 MG: 150 INJECTION SUBCUTANEOUS at 16:21

## 2024-08-10 RX ADMIN — IPRATROPIUM BROMIDE AND ALBUTEROL SULFATE 3 ML: .5; 3 SOLUTION RESPIRATORY (INHALATION) at 14:12

## 2024-08-10 RX ADMIN — METHYLPREDNISOLONE SODIUM SUCCINATE 125 MG: 125 INJECTION, POWDER, FOR SOLUTION INTRAMUSCULAR; INTRAVENOUS at 14:24

## 2024-08-10 NOTE — H&P
[facility] Medicine Services  History & Physical    Patient Name: Jass Odell  : 1980  MRN: [id number]  Primary Care Physician:  Head, Tom Benavidez MD  Date of admission: 8/10/2024  Date and Time of Service: 8/10/2024 at 1400    Subjective      Chief Complaint: Shortness of breath x 2 months     History of Present Illness: Jass Odell is a 44 y.o. male with a CMH of DVT five years ago who presented to [facility] on 8/10/2024 with shortness of breath, dark sputum and persistent cough. Patient states that he has seen his PCP a few times recently and has completed antibiotics. He was given steroids a month ago, but stated they upset his stomach. He quit smoking cigarettes 10 years ago and smokes cigars occasionally, but has not had a cigar in several months. He had an unprovoked DVT about 4-5 years ago and took eliquis for 6 months. He recently had a cardiac cath in  at [facility] which was unremarkable. He works at [employer] and is very active physically. He states he has had some fevers and chills in the last few weeks. He denies chest pain, headache or leg swelling.       Review of Systems   Constitutional:  Negative for chills, fatigue and fever.   Respiratory:  Positive for cough and shortness of breath. Negative for chest tightness and wheezing.    Cardiovascular:  Negative for chest pain, palpitations and leg swelling.   Neurological:  Negative for dizziness and headaches.       Personal History     No past medical history on file.    No past surgical history on file.    Family History: family history is not on file. Otherwise pertinent FHx was reviewed and not pertinent to current issue.    Social History:  reports that he has been smoking cigars. He does not have any smokeless tobacco history on file. He reports that he does not currently use alcohol. He reports current drug use. Drug: Marijuana.    Home Medications:  Prior to Admission Medications       Prescriptions Last Dose Informant  Patient Reported? Taking?    meloxicam (Mobic) 15 MG tablet   No No    Take 1 tablet by mouth Daily. Prn pain    ondansetron (Zofran) 4 MG tablet   No No    Take 1 tablet by mouth 4 (Four) Times a Day. Prn nausea              Allergies:  No Known Allergies    Objective      Vitals:   Temp:  [97.4 °F (36.3 °C)] 97.4 °F (36.3 °C)  Heart Rate:  [64-70] 68  Resp:  [10-20] 16  BP: (113-143)/(67-80) 117/80  Body mass index is 32.59 kg/m².  Physical Exam  Constitutional:       Appearance: Normal appearance.   HENT:      Head: Normocephalic and atraumatic.      Nose: Nose normal.      Mouth/Throat:      Mouth: Mucous membranes are moist.   Eyes:      Extraocular Movements: Extraocular movements intact.      Conjunctiva/sclera: Conjunctivae normal.      Pupils: Pupils are equal, round, and reactive to light.   Cardiovascular:      Rate and Rhythm: Normal rate and regular rhythm.      Pulses: Normal pulses.      Heart sounds: Normal heart sounds.   Pulmonary:      Effort: Pulmonary effort is normal.      Breath sounds: Decreased breath sounds present.   Abdominal:      General: Abdomen is flat. Bowel sounds are normal.      Palpations: Abdomen is soft.   Musculoskeletal:         General: Normal range of motion.      Cervical back: Normal range of motion.   Skin:     General: Skin is warm and dry.   Neurological:      General: No focal deficit present.      Mental Status: He is alert and oriented to person, place, and time. Mental status is at baseline.   Psychiatric:         Mood and Affect: Mood normal.         Behavior: Behavior normal.         Thought Content: Thought content normal.         Judgment: Judgment normal.         Diagnostic Data:  Lab Results (last 24 hours)       Procedure Component Value Units Date/Time    Comprehensive Metabolic Panel [[id number]] Collected: 08/10/24 1303    Specimen: Blood Updated: 08/10/24 1340     Glucose 93 mg/dL      BUN 15 mg/dL      Creatinine 0.88 mg/dL      Sodium 139 mmol/L       Potassium 4.4 mmol/L      Comment: Slight hemolysis detected by analyzer. Result may be falsely elevated.        Chloride 104 mmol/L      CO2 25.2 mmol/L      Calcium 9.4 mg/dL      Total Protein 6.9 g/dL      Albumin 4.5 g/dL      ALT (SGPT) 20 U/L      AST (SGOT) 21 U/L      Comment: Slight hemolysis detected by analyzer. Result may be falsely elevated.        Alkaline Phosphatase 44 U/L      Total Bilirubin 1.0 mg/dL      Globulin 2.4 gm/dL      A/G Ratio 1.9 g/dL      BUN/Creatinine Ratio 17.0     Anion Gap 9.8 mmol/L      eGFR 110.1 mL/min/1.73     Narrative:      GFR Normal >60  Chronic Kidney Disease <60  Kidney Failure <15      High Sensitivity Troponin T [[id number]]  (Normal) Collected: 08/10/24 1303    Specimen: Blood Updated: 08/10/24 1333     HS Troponin T 7 ng/L     Narrative:      High Sensitive Troponin T Reference Range:  <14.0 ng/L- Negative Female for AMI  <22.0 ng/L- Negative Male for AMI  >=14 - Abnormal Female indicating possible myocardial injury.  >=22 - Abnormal Male indicating possible myocardial injury.   Clinicians would have to utilize clinical acumen, EKG, Troponin, and serial changes to determine if it is an Acute Myocardial Infarction or myocardial injury due to an underlying chronic condition.         BNP [[id number]]  (Normal) Collected: 08/10/24 1303    Specimen: Blood Updated: 08/10/24 1333     proBNP <36.0 pg/mL     Narrative:      This assay is used as an aid in the diagnosis of individuals suspected of having heart failure. It can be used as an aid in the diagnosis of acute decompensated heart failure (ADHF) in patients presenting with signs and symptoms of ADHF to the emergency department (ED). In addition, NT-proBNP of <300 pg/mL indicates ADHF is not likely.    Age Range Result Interpretation  NT-proBNP Concentration (pg/mL:      <50             Positive            >450                   Gray                 300-450                    Negative             <300    50-75            Positive            >900                  Gray                300-900                  Negative            <300      >75             Positive            >1800                  Gray                300-1800                  Negative            <300    Protime-INR [[id number]]  (Normal) Collected: 08/10/24 1303    Specimen: Blood Updated: 08/10/24 1319     Protime 10.5 Seconds      INR 0.96    aPTT [[id number]]  (Abnormal) Collected: 08/10/24 1303    Specimen: Blood Updated: 08/10/24 1319     PTT 26.8 seconds     CBC & Differential [[id number]]  (Abnormal) Collected: 08/10/24 1303    Specimen: Blood Updated: 08/10/24 1307    Narrative:      The following orders were created for panel order CBC & Differential.  Procedure                               Abnormality         Status                     ---------                               -----------         ------                     CBC Auto Differential[[id number]]        Abnormal            Final result                 Please view results for these tests on the individual orders.    CBC Auto Differential [[id number]]  (Abnormal) Collected: 08/10/24 1303    Specimen: Blood Updated: 08/10/24 1307     WBC 7.42 10*3/mm3      RBC 4.50 10*6/mm3      Hemoglobin 14.9 g/dL      Hematocrit 44.7 %      MCV 99.3 fL      MCH 33.1 pg      MCHC 33.3 g/dL      RDW 11.7 %      RDW-SD 42.8 fl      MPV 10.0 fL      Platelets 209 10*3/mm3      Neutrophil % 63.7 %      Lymphocyte % 25.2 %      Monocyte % 9.3 %      Eosinophil % 1.1 %      Basophil % 0.4 %      Immature Grans % 0.3 %      Neutrophils, Absolute 4.73 10*3/mm3      Lymphocytes, Absolute 1.87 10*3/mm3      Monocytes, Absolute 0.69 10*3/mm3      Eosinophils, Absolute 0.08 10*3/mm3      Basophils, Absolute 0.03 10*3/mm3      Immature Grans, Absolute 0.02 10*3/mm3      nRBC 0.0 /100 WBC              Imaging Results (Last 24 Hours)       Procedure Component Value Units Date/Time    CT Angiogram Chest Pulmonary Embolism [[id number]]  Collected: 08/10/24 1333     Updated: 08/10/24 1343    Narrative:      CT ANGIOGRAM CHEST PULMONARY EMBOLISM    Date of Exam: 8/10/2024 1:31 PM EDT    Indication: soa.    Comparison: None available.    Technique: Axial CT images were obtained of the chest after the uneventful intravenous administration of iodinated contrast utilizing pulmonary embolism protocol.  Sagittal and coronal reconstructions were performed.  Automated exposure control and   iterative reconstruction methods were used.      Findings:    Pulmonary arteries: Borderline opacification of the pulmonary arteries, especially the distal segmental and subsegmental branches. There are questionable small pulmonary emboli within the distal segmental subsegmental branches, especially in the right   lower lobe (for example image 120 of series 4). The central pulmonary arteries and lobar branches are patent. The pulmonary arteries are normal in size.    Aorta: Unremarkable.    Lungs and Pleura: The lungs are clear. No pleural effusion or pneumothorax. The airways are patent.    Mediastinum/Danni: No lymphadenopathy. No suspicious mass.    Heart: Normal in size. No pericardial effusion. Normal RV/LV ratio.    Soft Tissue: Unremarkable.      Upper Abdomen: Unremarkable.    Bones: No acute osseous abnormality.        Impression:      Impression:  The central and lobar branch pulmonary arteries are patent without evidence of embolism. Questionable very small emboli within the distal subsegmental branches, especially in the right lower lobe, although these are most likely artifactual given motion   degradation and borderline opacification of the pulmonary arteries.    Otherwise no definite acute intrathoracic abnormality.        Electronically Signed: Pritesh Mayo DO    8/10/2024 1:41 PM EDT    Workstation ID: [id number]    XR Chest 1 View [[id number]] Collected: 08/10/24 1325     Updated: 08/10/24 1328    Narrative:      XR CHEST 1 VW    Date of Exam:  8/10/2024 1:23 PM EDT    Indication: soa cough    Comparison: 6/11/2024    Findings:  Lines: None    Lungs: The lungs are clear.  Pleura: No pleural effusion or pneumothorax.    Cardiomediastinum: The cardiomediastinal silhouette is normal    Soft Tissues: Unremarkable.    Bones: No acute osseous abnormality.      Impression:      Impression:  No acute abnormality.      Electronically Signed: Pritesh Mayo DO    8/10/2024 1:26 PM EDT    Workstation ID: [id number]              Assessment & Plan        This is a 44 y.o. male with:    Active and Resolved Problems  There are no hospital problems to display for this patient.      Pulmonary Emboli  -Questionable on CT scan 8/10/24  -Due to history of DVT, we will start him on lovenox 1mg/kg BID   -Pulmonary consult for possible bronch  -Cardiac monitoring     Depression/Anxiety   -Continue home Lexapro     GERD  -Continue home Protonix     VTE Prophylaxis:  Pharmacologic VTE prophylaxis orders are present.        The patient desires to be as follows:    CODE STATUS:       Full Code     Admission Status:  I believe this patient meets observation status.    Expected Length of Stay: less than two midnights     PDMP and Medication Dispenses via Sidebar reviewed and consistent with patient reported medications.    I discussed the patient's findings and my recommendations with patient.      Signature:     This document has been electronically signed by SHAUNA Hagen on August 10, 2024 14:56 EDT   [facility] Hospitalist Team

## 2024-08-10 NOTE — Clinical Note
Level of Care: Med/Surg [1]   Admitting Physician: JOE CABRAL [[id number]]   Attending Physician: JOE CABRAL [[id number]]

## 2024-08-10 NOTE — ED PROVIDER NOTES
"Subjective   History of Present Illness  Chief complaint: Patient is a 44-year-old who presents with cough and green mucus production.  He is also having left-sided chest pain.  This is all been going on for few months.  He has been seen at [facility] and by his primary physician.  He states \"they keep throwing antibiotics at me but it is not working\".  He does have these horrible coughing spells that cause him to shake and break out in sweats.  He has had an occasional night sweat.  He has not had any recent long trip.  No TB exposure.  He had a heart catheterization in June.  He was told his chest pain was likely due to acid reflux.    Context:    Duration:    Timing:    Severity:    Associated Symptoms:        PCP:  LMP:      Review of Systems   Constitutional:  Positive for diaphoresis and fatigue.   HENT:  Positive for congestion.    Respiratory:  Positive for cough.    Cardiovascular:  Positive for chest pain.   Gastrointestinal:  Negative for abdominal pain.   Genitourinary: Negative.    Musculoskeletal: Negative.        No past medical history on file.    No Known Allergies    No past surgical history on file.    No family history on file.    Social History     Socioeconomic History    Marital status:    Tobacco Use    Smoking status: Some Days     Types: Cigars   Vaping Use    Vaping status: Never Used   Substance and Sexual Activity    Alcohol use: Not Currently    Drug use: Yes     Types: Marijuana     Comment: occ    Sexual activity: Defer           Objective   Physical Exam  Vitals and nursing note reviewed.   Constitutional:       Appearance: Normal appearance.   HENT:      Head: Normocephalic and atraumatic.   Cardiovascular:      Rate and Rhythm: Normal rate and regular rhythm.   Pulmonary:      Breath sounds: Examination of the right-middle field reveals decreased breath sounds. Examination of the right-lower field reveals decreased breath sounds. Decreased breath sounds present. "   Musculoskeletal:         General: Normal range of motion.      Cervical back: Normal range of motion.   Skin:     General: Skin is warm and dry.   Neurological:      General: No focal deficit present.      Mental Status: He is alert and oriented to person, place, and time.   Psychiatric:         Mood and Affect: Mood normal.         Thought Content: Thought content normal.         Procedures           ED Course                                 Results for orders placed or performed during the hospital encounter of 08/10/24   Comprehensive Metabolic Panel    Specimen: Blood   Result Value Ref Range    Glucose 93 65 - 99 mg/dL    BUN 15 6 - 20 mg/dL    Creatinine 0.88 0.76 - 1.27 mg/dL    Sodium 139 136 - 145 mmol/L    Potassium 4.4 3.5 - 5.2 mmol/L    Chloride 104 98 - 107 mmol/L    CO2 25.2 22.0 - 29.0 mmol/L    Calcium 9.4 8.6 - 10.5 mg/dL    Total Protein 6.9 6.0 - 8.5 g/dL    Albumin 4.5 3.5 - 5.2 g/dL    ALT (SGPT) 20 1 - 41 U/L    AST (SGOT) 21 1 - 40 U/L    Alkaline Phosphatase 44 39 - 117 U/L    Total Bilirubin 1.0 0.0 - 1.2 mg/dL    Globulin 2.4 gm/dL    A/G Ratio 1.9 g/dL    BUN/Creatinine Ratio 17.0 7.0 - 25.0    Anion Gap 9.8 5.0 - 15.0 mmol/L    eGFR 110.1 >60.0 mL/min/1.73   Protime-INR    Specimen: Blood   Result Value Ref Range    Protime 10.5 9.6 - 11.7 Seconds    INR 0.96 0.93 - 1.10   aPTT    Specimen: Blood   Result Value Ref Range    PTT 26.8 (L) 61.0 - 76.5 seconds   High Sensitivity Troponin T    Specimen: Blood   Result Value Ref Range    HS Troponin T 7 <22 ng/L   BNP    Specimen: Blood   Result Value Ref Range    proBNP <36.0 0.0 - 450.0 pg/mL   CBC Auto Differential    Specimen: Blood   Result Value Ref Range    WBC 7.42 3.40 - 10.80 10*3/mm3    RBC 4.50 4.14 - 5.80 10*6/mm3    Hemoglobin 14.9 13.0 - 17.7 g/dL    Hematocrit 44.7 37.5 - 51.0 %    MCV 99.3 (H) 79.0 - 97.0 fL    MCH 33.1 (H) 26.6 - 33.0 pg    MCHC 33.3 31.5 - 35.7 g/dL    RDW 11.7 (L) 12.3 - 15.4 %    RDW-SD 42.8 37.0 - 54.0  fl    MPV 10.0 6.0 - 12.0 fL    Platelets 209 140 - 450 10*3/mm3    Neutrophil % 63.7 42.7 - 76.0 %    Lymphocyte % 25.2 19.6 - 45.3 %    Monocyte % 9.3 5.0 - 12.0 %    Eosinophil % 1.1 0.3 - 6.2 %    Basophil % 0.4 0.0 - 1.5 %    Immature Grans % 0.3 0.0 - 0.5 %    Neutrophils, Absolute 4.73 1.70 - 7.00 10*3/mm3    Lymphocytes, Absolute 1.87 0.70 - 3.10 10*3/mm3    Monocytes, Absolute 0.69 0.10 - 0.90 10*3/mm3    Eosinophils, Absolute 0.08 0.00 - 0.40 10*3/mm3    Basophils, Absolute 0.03 0.00 - 0.20 10*3/mm3    Immature Grans, Absolute 0.02 0.00 - 0.05 10*3/mm3    nRBC 0.0 0.0 - 0.2 /100 WBC          CT Angiogram Chest Pulmonary Embolism    Result Date: 8/10/2024  Impression: The central and lobar branch pulmonary arteries are patent without evidence of embolism. Questionable very small emboli within the distal subsegmental branches, especially in the right lower lobe, although these are most likely artifactual given motion degradation and borderline opacification of the pulmonary arteries. Otherwise no definite acute intrathoracic abnormality. Electronically Signed: Pritesh Mayo DO  8/10/2024 1:41 PM EDT  Workstation ID: [id number]    XR Chest 1 View    Result Date: 8/10/2024  Impression: No acute abnormality. Electronically Signed: Pritesh Mayo DO  8/10/2024 1:26 PM EDT  Workstation ID: [id number]          Medical Decision Making  Patient was seen evaluate for the above problem    Differential diagnosis includes but is not limited to PE, pneumonia, pneumothorax, asthma, ACS    Patient's has had recurrent family practitioner office visits for these symptoms.  He is coughing up green and persistently dyspneic.  He does have remote history of unprovoked DVT.  He was on anticoagulation for 6 months.  He states he never figure out why he developed a DVT.  His chest CT questions right lower lobe pulmonary emboli.  They state potentially just artifact.  However given this history I discussed with Susu in the  hospital staff who will treat the patient with Lovenox.  Discussed findings with patient.  He verbalized understanding is okay at this    Problems Addressed:  Dyspnea, unspecified type: complicated acute illness or injury    Amount and/or Complexity of Data Reviewed  Labs: ordered. Decision-making details documented in ED Course.     Details: Labs reviewed by myself  Radiology: ordered and independent interpretation performed.     Details: CT reviewed by myself as above    Risk  Prescription drug management.  Decision regarding hospitalization.        Final diagnoses:   None   Dyspnea  Pulmonary embolism    ED Disposition  ED Disposition       None            No follow-up provider specified.       Medication List      No changes were made to your prescriptions during this visit.            Ezra Meyer,   08/10/24 1503

## 2024-08-10 NOTE — ED NOTES
Nursing report ED to floor  Jass Odell  44 y.o.  male    HPI :  HPI (Adult)  Stated Reason for Visit: cough for 2 months  History Obtained From: patient    Chief Complaint  Chief Complaint   Patient presents with    Cough     Cough for 2 months,  states he is coughing up green phlem, pmd just keeps giving him antibiotics but is is not any better.           Admitting doctor:   Sherry Fernandez MD    Admitting diagnosis:   The primary encounter diagnosis was Dyspnea, unspecified type. A diagnosis of Other acute pulmonary embolism without acute cor pulmonale was also pertinent to this visit.    Code status:   Current Code Status       Date Active Code Status Order ID Comments User Context       8/10/2024 1512 CPR (Attempt to Resuscitate) [id number]  Susu Bailey APRN ED        Question Answer    Code Status (Patient has no pulse and is not breathing) CPR (Attempt to Resuscitate)    Medical Interventions (Patient has pulse or is breathing) Full Support                    Allergies:   Patient has no known allergies.    Isolation:   No active isolations    Intake and Output  No intake or output data in the 24 hours ending 08/10/24 1945    Weight:       08/10/24  1232   Weight: 106 kg (233 lb 11 oz)       Most recent vitals:   Vitals:    08/10/24 1417 08/10/24 1431 08/10/24 1532 08/10/24 1847   BP: 113/67 117/80 126/75 120/72   BP Location:       Patient Position:       Pulse: 70 68 70 69   Resp: 18 16 18    Temp:       TempSrc:       SpO2: 100% 96% 97% 94%   Weight:       Height:           Active LDAs/IV Access:   Lines, Drains & Airways       Active LDAs       Name Placement date Placement time Site Days    Peripheral IV 08/10/24 1318 Right Antecubital 08/10/24  1318  Antecubital  less than 1                    Labs (abnormal labs have a star):   Labs Reviewed   APTT - Abnormal; Notable for the following components:       Result Value    PTT 26.8 (*)     All other components within normal limits   CBC WITH AUTO  DIFFERENTIAL - Abnormal; Notable for the following components:    MCV 99.3 (*)     MCH 33.1 (*)     RDW 11.7 (*)     All other components within normal limits   PROTIME-INR - Normal   TROPONIN - Normal    Narrative:     High Sensitive Troponin T Reference Range:  <14.0 ng/L- Negative Female for AMI  <22.0 ng/L- Negative Male for AMI  >=14 - Abnormal Female indicating possible myocardial injury.  >=22 - Abnormal Male indicating possible myocardial injury.   Clinicians would have to utilize clinical acumen, EKG, Troponin, and serial changes to determine if it is an Acute Myocardial Infarction or myocardial injury due to an underlying chronic condition.        BNP (IN-HOUSE) - Normal    Narrative:     This assay is used as an aid in the diagnosis of individuals suspected of having heart failure. It can be used as an aid in the diagnosis of acute decompensated heart failure (ADHF) in patients presenting with signs and symptoms of ADHF to the emergency department (ED). In addition, NT-proBNP of <300 pg/mL indicates ADHF is not likely.    Age Range Result Interpretation  NT-proBNP Concentration (pg/mL:      <50             Positive            >450                   Gray                 300-450                    Negative             <300    50-75           Positive            >900                  Gray                300-900                  Negative            <300      >75             Positive            >1800                  Gray                300-1800                  Negative            <300   HIGH SENSITIVITIY TROPONIN T 2HR - Normal    Narrative:     High Sensitive Troponin T Reference Range:  <14.0 ng/L- Negative Female for AMI  <22.0 ng/L- Negative Male for AMI  >=14 - Abnormal Female indicating possible myocardial injury.  >=22 - Abnormal Male indicating possible myocardial injury.   Clinicians would have to utilize clinical acumen, EKG, Troponin, and serial changes to determine if it is an Acute Myocardial  Infarction or myocardial injury due to an underlying chronic condition.        MAGNESIUM - Normal   COMPREHENSIVE METABOLIC PANEL    Narrative:     GFR Normal >60  Chronic Kidney Disease <60  Kidney Failure <15     CBC AND DIFFERENTIAL    Narrative:     The following orders were created for panel order CBC & Differential.  Procedure                               Abnormality         Status                     ---------                               -----------         ------                     CBC Auto Differential[[id number]]        Abnormal            Final result                 Please view results for these tests on the individual orders.       EKG:   ECG 12 Lead Dyspnea    (Results Pending)       Meds given in ED:   Medications   Enoxaparin Sodium (LOVENOX) syringe 105 mg (105 mg Subcutaneous Given 8/10/24 1621)   sodium chloride 0.9 % flush 10 mL (has no administration in time range)   sodium chloride 0.9 % flush 10 mL (has no administration in time range)   sodium chloride 0.9 % infusion 40 mL (has no administration in time range)   Potassium Replacement - Follow Nurse / BPA Driven Protocol (has no administration in time range)   Magnesium Standard Dose Replacement - Follow Nurse / BPA Driven Protocol (has no administration in time range)   Phosphorus Replacement - Follow Nurse / BPA Driven Protocol (has no administration in time range)   Calcium Replacement - Follow Nurse / BPA Driven Protocol (has no administration in time range)   pantoprazole (PROTONIX) EC tablet 40 mg (has no administration in time range)   escitalopram (LEXAPRO) tablet 10 mg (has no administration in time range)   iopamidol (ISOVUE-370) 76 % injection 100 mL (100 mL Intravenous Given 8/10/24 1332)   methylPREDNISolone sodium succinate (SOLU-Medrol) injection 125 mg (125 mg Intravenous Given 8/10/24 1424)   ipratropium-albuterol (DUO-NEB) nebulizer solution 3 mL (3 mL Nebulization Given 8/10/24 1412)       Imaging results:  CT Angiogram  Chest Pulmonary Embolism    Result Date: 8/10/2024  Impression: The central and lobar branch pulmonary arteries are patent without evidence of embolism. Questionable very small emboli within the distal subsegmental branches, especially in the right lower lobe, although these are most likely artifactual given motion degradation and borderline opacification of the pulmonary arteries. Otherwise no definite acute intrathoracic abnormality. Electronically Signed: Pritesh Mayo,   8/10/2024 1:41 PM EDT  Workstation ID: [id number]    XR Chest 1 View    Result Date: 8/10/2024  Impression: No acute abnormality. Electronically Signed: Pritesh Mayo, DO  8/10/2024 1:26 PM EDT  Workstation ID: [id number]     Ambulatory status:   - ad ha    Social issues:   Social History     Socioeconomic History    Marital status:    Tobacco Use    Smoking status: Some Days     Types: Cigars   Vaping Use    Vaping status: Never Used   Substance and Sexual Activity    Alcohol use: Not Currently    Drug use: Yes     Types: Marijuana     Comment: occ    Sexual activity: Defer       Peripheral Neurovascular  Peripheral Neurovascular (Adult)  Peripheral Neurovascular WDL: WDL    Neuro Cognitive  Neuro Cognitive (Adult)  Cognitive/Neuro/Behavioral WDL: WDL  Additional Documentation: Amisha Coma Scale (Group)  Amisha Coma Scale  Best Eye Response: 4-->(E4) spontaneous  Best Motor Response: 6-->(M6) obeys commands  Best Verbal Response: 5-->(V5) oriented  [place] Coma Scale Score: 15    Learning  Learning Assessment (Adult)  Learning Readiness and Ability: no barriers identified    Respiratory  Respiratory WDL  Respiratory WDL: WDL  Rhythm/Pattern, Respiratory: shortness of breath, depth regular, pattern regular, unlabored  Expansion/Accessory Muscles/Retractions: no retractions, no use of accessory muscles  Nailbeds: no discoloration  Mucous Membranes: pink, intact, moist  Cough Frequency: frequent  Cough Type: congested, croupy,  good, loose, productive  Respiratory Secretions Assessment  Secretions Amount: large  Secretions Color: green, black, brown  Secretions Characteristics: thick  Breath Sounds  Breath Sounds: All Fields  All Lung Fields Breath Sounds: crackles, fine, equal bilaterally  Breath Sounds Post-Respiratory Treatment  Breath Sounds Posttreatment All Fields: All Fields  Breath Sounds Posttreatment All Fields: no change    Abdominal Pain       Pain Assessments  Pain (Adult)  (0-10) Pain Rating: Rest: 9  Pain Location: chest    NIH Stroke Scale       Ed Cordero RN  08/10/24 19:45 EDT

## 2024-08-11 ENCOUNTER — ON CAMPUS - OUTPATIENT (AMBULATORY)
Dept: URBAN - METROPOLITAN AREA HOSPITAL 85 | Facility: HOSPITAL | Age: 44
End: 2024-08-11
Payer: COMMERCIAL

## 2024-08-11 ENCOUNTER — APPOINTMENT (OUTPATIENT)
Dept: CT IMAGING | Facility: HOSPITAL | Age: 44
End: 2024-08-11
Payer: COMMERCIAL

## 2024-08-11 ENCOUNTER — APPOINTMENT (OUTPATIENT)
Dept: CARDIOLOGY | Facility: HOSPITAL | Age: 44
End: 2024-08-11
Payer: COMMERCIAL

## 2024-08-11 DIAGNOSIS — R09.3 ABNORMAL SPUTUM: ICD-10-CM

## 2024-08-11 DIAGNOSIS — K21.9 GASTRO-ESOPHAGEAL REFLUX DISEASE WITHOUT ESOPHAGITIS: ICD-10-CM

## 2024-08-11 DIAGNOSIS — R13.10 DYSPHAGIA, UNSPECIFIED: ICD-10-CM

## 2024-08-11 DIAGNOSIS — I26.99 OTHER PULMONARY EMBOLISM WITHOUT ACUTE COR PULMONALE: ICD-10-CM

## 2024-08-11 LAB
BACTERIA SPEC RESP CULT: NORMAL
BH CV LOWER VASCULAR LEFT COMMON FEMORAL AUGMENT: NORMAL
BH CV LOWER VASCULAR LEFT COMMON FEMORAL COMPETENT: NORMAL
BH CV LOWER VASCULAR LEFT COMMON FEMORAL COMPRESS: NORMAL
BH CV LOWER VASCULAR LEFT COMMON FEMORAL PHASIC: NORMAL
BH CV LOWER VASCULAR LEFT COMMON FEMORAL SPONT: NORMAL
BH CV LOWER VASCULAR LEFT DISTAL FEMORAL COMPRESS: NORMAL
BH CV LOWER VASCULAR LEFT GASTRONEMIUS COMPRESS: NORMAL
BH CV LOWER VASCULAR LEFT GREATER SAPH AK COMPRESS: NORMAL
BH CV LOWER VASCULAR LEFT GREATER SAPH BK COMPRESS: NORMAL
BH CV LOWER VASCULAR LEFT LESSER SAPH COMPRESS: NORMAL
BH CV LOWER VASCULAR LEFT MID FEMORAL AUGMENT: NORMAL
BH CV LOWER VASCULAR LEFT MID FEMORAL COMPETENT: NORMAL
BH CV LOWER VASCULAR LEFT MID FEMORAL COMPRESS: NORMAL
BH CV LOWER VASCULAR LEFT MID FEMORAL PHASIC: NORMAL
BH CV LOWER VASCULAR LEFT MID FEMORAL SPONT: NORMAL
BH CV LOWER VASCULAR LEFT PERONEAL COMPRESS: NORMAL
BH CV LOWER VASCULAR LEFT POPLITEAL AUGMENT: NORMAL
BH CV LOWER VASCULAR LEFT POPLITEAL COMPETENT: NORMAL
BH CV LOWER VASCULAR LEFT POPLITEAL COMPRESS: NORMAL
BH CV LOWER VASCULAR LEFT POPLITEAL PHASIC: NORMAL
BH CV LOWER VASCULAR LEFT POPLITEAL SPONT: NORMAL
BH CV LOWER VASCULAR LEFT POSTERIOR TIBIAL COMPRESS: NORMAL
BH CV LOWER VASCULAR LEFT PROXIMAL FEMORAL COMPRESS: NORMAL
BH CV LOWER VASCULAR LEFT SAPHENOFEMORAL JUNCTION COMPRESS: NORMAL
BH CV LOWER VASCULAR RIGHT COMMON FEMORAL AUGMENT: NORMAL
BH CV LOWER VASCULAR RIGHT COMMON FEMORAL COMPETENT: NORMAL
BH CV LOWER VASCULAR RIGHT COMMON FEMORAL COMPRESS: NORMAL
BH CV LOWER VASCULAR RIGHT COMMON FEMORAL PHASIC: NORMAL
BH CV LOWER VASCULAR RIGHT COMMON FEMORAL SPONT: NORMAL
BH CV LOWER VASCULAR RIGHT DISTAL FEMORAL COMPRESS: NORMAL
BH CV LOWER VASCULAR RIGHT GASTRONEMIUS COMPRESS: NORMAL
BH CV LOWER VASCULAR RIGHT GREATER SAPH AK COMPRESS: NORMAL
BH CV LOWER VASCULAR RIGHT GREATER SAPH BK COMPRESS: NORMAL
BH CV LOWER VASCULAR RIGHT LESSER SAPH COMPRESS: NORMAL
BH CV LOWER VASCULAR RIGHT MID FEMORAL AUGMENT: NORMAL
BH CV LOWER VASCULAR RIGHT MID FEMORAL COMPETENT: NORMAL
BH CV LOWER VASCULAR RIGHT MID FEMORAL COMPRESS: NORMAL
BH CV LOWER VASCULAR RIGHT MID FEMORAL PHASIC: NORMAL
BH CV LOWER VASCULAR RIGHT MID FEMORAL SPONT: NORMAL
BH CV LOWER VASCULAR RIGHT PERONEAL COMPRESS: NORMAL
BH CV LOWER VASCULAR RIGHT POPLITEAL AUGMENT: NORMAL
BH CV LOWER VASCULAR RIGHT POPLITEAL COMPETENT: NORMAL
BH CV LOWER VASCULAR RIGHT POPLITEAL COMPRESS: NORMAL
BH CV LOWER VASCULAR RIGHT POPLITEAL PHASIC: NORMAL
BH CV LOWER VASCULAR RIGHT POPLITEAL SPONT: NORMAL
BH CV LOWER VASCULAR RIGHT POSTERIOR TIBIAL COMPRESS: NORMAL
BH CV LOWER VASCULAR RIGHT PROXIMAL FEMORAL COMPRESS: NORMAL
BH CV LOWER VASCULAR RIGHT SAPHENOFEMORAL JUNCTION COMPRESS: NORMAL
D DIMER PPP FEU-MCNC: 0.27 MG/L (FEU) (ref 0–0.5)
GRAM STN SPEC: NORMAL

## 2024-08-11 PROCEDURE — 93970 EXTREMITY STUDY: CPT | Performed by: SURGERY

## 2024-08-11 PROCEDURE — 25010000002 ENOXAPARIN PER 10 MG

## 2024-08-11 PROCEDURE — G0378 HOSPITAL OBSERVATION PER HR: HCPCS

## 2024-08-11 PROCEDURE — 87205 SMEAR GRAM STAIN: CPT | Performed by: NURSE PRACTITIONER

## 2024-08-11 PROCEDURE — 99222 1ST HOSP IP/OBS MODERATE 55: CPT | Performed by: INTERNAL MEDICINE

## 2024-08-11 PROCEDURE — 93970 EXTREMITY STUDY: CPT

## 2024-08-11 PROCEDURE — 85379 FIBRIN DEGRADATION QUANT: CPT | Performed by: NURSE PRACTITIONER

## 2024-08-11 PROCEDURE — 96372 THER/PROPH/DIAG INJ SC/IM: CPT

## 2024-08-11 PROCEDURE — 72125 CT NECK SPINE W/O DYE: CPT

## 2024-08-11 RX ORDER — ACETAMINOPHEN 325 MG/1
650 TABLET ORAL EVERY 6 HOURS PRN
Status: DISCONTINUED | OUTPATIENT
Start: 2024-08-11 | End: 2024-08-13 | Stop reason: HOSPADM

## 2024-08-11 RX ORDER — TIZANIDINE 4 MG/1
4 TABLET ORAL EVERY 8 HOURS PRN
Status: DISCONTINUED | OUTPATIENT
Start: 2024-08-11 | End: 2024-08-13 | Stop reason: HOSPADM

## 2024-08-11 RX ORDER — FAMOTIDINE 20 MG/1
20 TABLET, FILM COATED ORAL
Status: DISCONTINUED | OUTPATIENT
Start: 2024-08-11 | End: 2024-08-11

## 2024-08-11 RX ORDER — PANTOPRAZOLE SODIUM 40 MG/1
40 TABLET, DELAYED RELEASE ORAL
Status: DISCONTINUED | OUTPATIENT
Start: 2024-08-11 | End: 2024-08-13 | Stop reason: HOSPADM

## 2024-08-11 RX ORDER — LIDOCAINE 4 G/G
2 PATCH TOPICAL
Status: DISCONTINUED | OUTPATIENT
Start: 2024-08-12 | End: 2024-08-12

## 2024-08-11 RX ADMIN — Medication 10 ML: at 08:15

## 2024-08-11 RX ADMIN — ACETAMINOPHEN 650 MG: 325 TABLET, FILM COATED ORAL at 13:04

## 2024-08-11 RX ADMIN — Medication 10 ML: at 20:05

## 2024-08-11 RX ADMIN — ESCITALOPRAM OXALATE 10 MG: 10 TABLET ORAL at 08:15

## 2024-08-11 RX ADMIN — PANTOPRAZOLE SODIUM 40 MG: 40 TABLET, DELAYED RELEASE ORAL at 08:15

## 2024-08-11 RX ADMIN — ENOXAPARIN SODIUM 105 MG: 150 INJECTION SUBCUTANEOUS at 14:13

## 2024-08-11 RX ADMIN — FAMOTIDINE 20 MG: 20 TABLET, FILM COATED ORAL at 17:17

## 2024-08-11 RX ADMIN — ENOXAPARIN SODIUM 105 MG: 150 INJECTION SUBCUTANEOUS at 03:58

## 2024-08-11 RX ADMIN — TIZANIDINE 4 MG: 4 TABLET ORAL at 14:13

## 2024-08-11 NOTE — DISCHARGE SUMMARY
[facility] MEDICINE SERVICE  DAILY PROGRESS NOTE    NAME: Jass Odell  : 1980  MRN: [id number]      LOS: 0 days     PROVIDER OF SERVICE: SHAUNA Salazar    Chief Complaint: Pulmonary emboli    Subjective:     Interval History:  History taken from: patient chart  Patient with complaints of intermittent neck pain, as well as bilateral ear pain.  Patient also states that he has been having this intermittent left chest burning for several months.    Review of Systems:   Review of Systems   Constitutional:  Negative for chills, fatigue and fever.   Respiratory:  Positive for cough and shortness of breath. Negative for chest tightness and wheezing.    Cardiovascular:  Negative for chest pain, palpitations and leg swelling.   Neurological:  Negative for dizziness and headaches.     Objective:     Vital Signs  Temp:  [97.7 °F (36.5 °C)-98.4 °F (36.9 °C)] 98.4 °F (36.9 °C)  Heart Rate:  [57-70] 59  Resp:  [10-18] 16  BP: (113-130)/(63-80) 116/68   Body mass index is 29.4 kg/m².    Physical Exam  Physical Exam  Constitutional:       Appearance: Normal appearance.   HENT:      Head: Normocephalic and atraumatic.      Nose: Nose normal.      Mouth/Throat:      Mouth: Mucous membranes are moist.   Eyes:      Extraocular Movements: Extraocular movements intact.      Conjunctiva/sclera: Conjunctivae normal.      Pupils: Pupils are equal, round, and reactive to light.   Cardiovascular:      Rate and Rhythm: Normal rate and regular rhythm.      Pulses: Normal pulses.      Heart sounds: Normal heart sounds.   Pulmonary:      Effort: Pulmonary effort is normal.      Breath sounds: Decreased breath sounds present.   Abdominal:      General: Abdomen is flat. Bowel sounds are normal.      Palpations: Abdomen is soft.   Musculoskeletal:         General: Normal range of motion.  Patient with tenderness to SCM     Cervical back: Normal range of motion.   Skin:     General: Skin is warm and dry.   Neurological:       General: No focal deficit present.      Mental Status: He is alert and oriented to person, place, and time. Mental status is at baseline.   Psychiatric:         Mood and Affect: Mood normal.         Behavior: Behavior normal.         Thought Content: Thought content normal.         Judgment: Judgment normal.     Scheduled Meds   enoxaparin, 1 mg/kg, Subcutaneous, Q12H  escitalopram, 10 mg, Oral, Daily  pantoprazole, 40 mg, Oral, Daily  sodium chloride, 10 mL, Intravenous, Q12H       PRN Meds     acetaminophen    Calcium Replacement - Follow Nurse / BPA Driven Protocol    Magnesium Standard Dose Replacement - Follow Nurse / BPA Driven Protocol    Phosphorus Replacement - Follow Nurse / BPA Driven Protocol    Potassium Replacement - Follow Nurse / BPA Driven Protocol    sodium chloride    sodium chloride    tiZANidine   Infusions         Diagnostic Data    Results from last 7 days   Lab Units 08/10/24  2235 08/10/24  1303   WBC 10*3/mm3 9.16 7.42   HEMOGLOBIN g/dL 15.2 14.9   HEMATOCRIT % 45.7 44.7   PLATELETS 10*3/mm3 235 209   GLUCOSE mg/dL 191* 93   CREATININE mg/dL 0.97 0.88   BUN mg/dL 17 15   SODIUM mmol/L 138 139   POTASSIUM mmol/L 4.5 4.4   AST (SGOT) U/L  --  21   ALT (SGPT) U/L  --  20   ALK PHOS U/L  --  44   BILIRUBIN mg/dL  --  1.0   ANION GAP mmol/L 11.0 9.8       CT Cervical Spine Without Contrast    Result Date: 8/11/2024  Impression: No acute abnormality. Electronically Signed: Pritesh Mayo DO  8/11/2024 12:01 PM EDT  Workstation ID: [id number]    CT Angiogram Chest Pulmonary Embolism    Result Date: 8/10/2024  Impression: The central and lobar branch pulmonary arteries are patent without evidence of embolism. Questionable very small emboli within the distal subsegmental branches, especially in the right lower lobe, although these are most likely artifactual given motion degradation and borderline opacification of the pulmonary arteries. Otherwise no definite acute intrathoracic abnormality.  Electronically Signed: Pritesh Mayo, DO  8/10/2024 1:41 PM EDT  Workstation ID: [id number]    XR Chest 1 View    Result Date: 8/10/2024  Impression: No acute abnormality. Electronically Signed: Pritesh Mayo, DO  8/10/2024 1:26 PM EDT  Workstation ID: [id number]       I reviewed the patient's new clinical results.  I reviewed the patient's new imaging results and agree with the interpretation.    Assessment/Plan:     Active and Resolved Problems  Active Hospital Problems    Diagnosis  POA    **Pulmonary emboli [I26.99]  Yes      Resolved Hospital Problems   No resolved problems to display.       Pulmonary emboli  - Questionable small emboli noted on CT scan dated 8/10/2024  - Continue with Lovenox, as patient has history of DVT  - Doppler ultrasound of bilateral lower extremities was negative  - Patient continues with brown sputum, no recent travel.  Sputum culture ordered  - Pulmonology consulted, awaiting recommendations  - Continue cardiac monitoring    Neck pain  - Cervical CT ordered  - Zanaflex muscle relaxer as needed    Depression/anxiety  - Continue home regimen of Lexapro    GERD  - Continue Protonix    VTE Prophylaxis:  Pharmacologic VTE prophylaxis orders are present.    Seen and evaluated patient.  Patient states that most of his symptoms have been ongoing for several months.  Patient states that this time that he is no longer having any dyspnea, however continues to have burning chest pain to left chest.  Patient does complain of bilateral ear pain, with some drainage, as well as some STM pain with TTP.  Will order muscle relaxer to see if this helps as well as cervical neck CT to rule out any abnormalities.  Cardiology workup at this point has been essentially normal, except for questionable small emboli noted on CT scan.  Will have pulmonology evaluate patient to determine if patient may require bronchoscopy.  Discussed plan of care with patient and patient agrees with plan.    Code status is    Code Status and Medical Interventions: CPR (Attempt to Resuscitate); Full Support   Ordered at: 08/10/24 1512     Code Status (Patient has no pulse and is not breathing):    CPR (Attempt to Resuscitate)     Medical Interventions (Patient has pulse or is breathing):    Full Support       Plan for disposition: 8/13/2024    Time: 30 minutes    Signature: Electronically signed by SHAUNA Salazar, 08/11/24, 13:00 EDT.  [facility] Hospitalist Team    Addendum:    I saw and examined the patient in addition to the BRANDY.  I agree with assessment and plan with the following addendum:    General Appearance:  Alert, cooperative, no distress, appears stated age  Head:  Normocephalic, without obvious abnormality, atraumatic  Eyes:  PERRL, conjunctiva/corneas clear, EOM's intact, fundi benign, both eyes  Ears:  Normal TM's and external ear canals, both ears  Nose: Nares normal, septum midline, mucosa normal, no drainage or sinus tenderness  Throat: Lips, mucosa, and tongue normal; teeth and gums normal  Neck: Supple, symmetrical, trachea midline, no adenopathy, thyroid: not enlarged, symmetric, no tenderness/mass/nodules, no carotid bruit or JVD  Lungs:   Clear to auscultation bilaterally, respirations unlabored  Heart: Regular rate and rhythm, S1, S2 normal, no murmur, rub or gallop  Abdomen:  Soft, non-tender, bowel sounds active all four quadrants,  no masses, no organomegaly  Extremities: Extremities normal, atraumatic, no cyanosis or edema  Pulses: 2+ and symmetric  Skin: Skin color, texture, turgor normal, no rashes or lesions  Neurologic: Normal    Patient states that his coughing is improved today however yesterday morning it was pretty significant with increased dark brown sputum.  Although there is concern for possible subsegmental PE on imaging of the chest, my other concern would also be GERD with aspiration pneumonitis.  He has been taking PPI daily for the last few months with improvement in his GERD  symptoms.,  Pulmonology has been consulted and they may consider bronchoscopy given his presentation.  He also complains of neck and bilateral shoulder pain, which seems to be musculoskeletal in nature given my exam.  I will check a CT of the cervical spine to rule out any acute cervical spine pathology.  I will also start him on muscle relaxers.      Doroteo Gambino MD  [facility] Hospitalist Team  08/11/24  13:18 EDT

## 2024-08-11 NOTE — PLAN OF CARE
Goal Outcome Evaluation:  Plan of Care Reviewed With: patient        Progress: no change  Outcome Evaluation: Pt arrived to floor from ED. AOx4, up with standby assist, call light within reach and able to make needs known. Belongings verified and documented at bedside. Will continue to monitor.

## 2024-08-11 NOTE — PLAN OF CARE
Goal Outcome Evaluation:         Pt is able to make needs known. Pain has been 8/10 throughout shift. Pt is able to ambulate independently. Education is complete, call light is in reach, and no other needs at this time. Continue to monitor.

## 2024-08-11 NOTE — CONSULTS
GI CONSULT  NOTE:    Referring Provider: Dr. Caldera    Chief complaint: Coughing up black liquid and shortness of breath    Subjective .     History of present illness: Patient has had shortness of breath over the past week and is been coughing up black speckled liquid for the last month.  He has had significant weight loss for the last month and has not been able to eat well.  He does not eat because he states he feels nauseous.  Denies any dysphagia but had significant odynophagia last month which also decreased his ability to eat.  He has been on PPI once daily.  He does have a history of smoking and a history of drinking but has stopped both.  Denies NSAIDs.  No black stool.  Has brown stools only.  Denies diarrhea or constipation.      Endo History:      Past Medical History:  Past Medical History:   Diagnosis Date    Anxiety     GERD (gastroesophageal reflux disease)        Past Surgical History:  History reviewed. No pertinent surgical history.    Social History:  Social History     Tobacco Use    Smoking status: Former     Types: Cigars    Smokeless tobacco: Former     Types: Chew   Vaping Use    Vaping status: Never Used   Substance Use Topics    Alcohol use: Not Currently    Drug use: Not Currently     Types: Marijuana     Comment: occ       Family History:  History reviewed. No pertinent family history.    Medications:  Medications Prior to Admission   Medication Sig Dispense Refill Last Dose    escitalopram (LEXAPRO) 10 MG tablet Take 1 tablet by mouth Daily.   8/10/2024    pantoprazole (PROTONIX) 40 MG EC tablet Take 1 tablet by mouth Daily.   8/10/2024       Scheduled Meds:escitalopram, 10 mg, Oral, Daily  famotidine, 20 mg, Oral, BID AC  pantoprazole, 40 mg, Oral, Daily  sodium chloride, 10 mL, Intravenous, Q12H      Continuous Infusions:   PRN Meds:.  acetaminophen    Calcium Replacement - Follow Nurse / BPA Driven Protocol    Magnesium Standard Dose Replacement - Follow Nurse / BPA Driven Protocol     Phosphorus Replacement - Follow Nurse / BPA Driven Protocol    Potassium Replacement - Follow Nurse / BPA Driven Protocol    sodium chloride    sodium chloride    tiZANidine    ALLERGIES:  Prednisone    ROS:  Review of Systems   Constitutional:  Positive for appetite change.   Respiratory:  Positive for cough, choking and shortness of breath.    All other systems reviewed and are negative.      Objective     Vital Signs:   Vitals:    08/11/24 0402 08/11/24 0807 08/11/24 1156 08/11/24 1628   BP: 113/63 116/68  128/69   BP Location: Left arm Left arm  Left arm   Patient Position: Lying Lying  Lying   Pulse: 59 66 59 67   Resp: 16 18 16 15   Temp: 98 °F (36.7 °C) 97.7 °F (36.5 °C) 98.4 °F (36.9 °C) 98.1 °F (36.7 °C)   TempSrc: Oral Oral Oral Oral   SpO2: 93% 94% 97% 95%   Weight:       Height:             Physical Exam:      General Appearance:    Awake and alert, in no acute distress   Head:    Normocephalic, without obvious abnormality, atraumatic   Eyes:            Conjunctivae normal, anicteric sclera   Ears:    Ears appear intact with no abnormalities noted   Throat:   No oral lesions, no thrush, oral mucosa moist   Neck:   No adenopathy, supple, no thyromegaly, no JVD   Lungs:     respirations regular, even and unlabored   Chest Wall:    No abnormalities observed   Abdomen:     soft, non-tender, no rebound or guarding, non-distended, no hepatosplenomegaly    Rectal:     Deferred   Extremities:   Moves all extremities well, no edema, no cyanosis, no             redness   Pulses:   Pulses palpable and equal bilaterally   Skin:   No bleeding, bruising or rash, no jaundice   Lymph nodes:   No palpable adenopathy   Neurologic:   Cranial nerves 2 - 12 grossly intact, no asterixis, sensation intact       Results Review:   I reviewed the patient's labs and imaging.  Lab Results (last 24 hours)       Procedure Component Value Units Date/Time    Respiratory Culture - Sputum, Cough [[id number]] Collected: 08/11/24 1240     "Specimen: Sputum from Cough Updated: 08/11/24 1323     Respiratory Culture Rejected     Gram Stain Rare (1+) WBCs per low power field      Few (2+) Epithelial cells per low power field      Moderate (3+) Mixed bacterial morphotypes seen on Gram Stain    Narrative:      Specimen rejected due to oropharyngeal contamination. Please reorder and recollect specimen if clinically necessary.    D-dimer, Quantitative [[id number]]  (Normal) Collected: 08/11/24 0851    Specimen: Blood from Arm, Left Updated: 08/11/24 0909     D-Dimer, Quantitative 0.27 mg/L (FEU)     Narrative:      According to the assay 's published package insert, a normal (<0.50 mg/L (FEU)) D-dimer result in conjunction with a non-high clinical probability assessment, excludes deep vein thrombosis (DVT) and pulmonary embolism (PE) with high sensitivity.    D-dimer values increase with age and this can make VTE exclusion of an older population difficult. To address this, the [employer], based on best available evidence and recent guidelines, recommends that clinicians use age-adjusted D-dimer thresholds in patients greater than 50 years of age with: a) a low probability of PE who do not meet all Pulmonary Embolism Rule Out Criteria, or b) in those with intermediate probability of PE.   The formula for an age-adjusted D-dimer cut-off is \"age/100\".  For example, a 60 year old patient would have an age-adjusted cut-off of 0.60 mg/L (FEU) and an 80 year old 0.80 mg/L (FEU).    Basic Metabolic Panel [[id number]]  (Abnormal) Collected: 08/10/24 2235    Specimen: Blood from Arm, Left Updated: 08/10/24 2307     Glucose 191 mg/dL      BUN 17 mg/dL      Creatinine 0.97 mg/dL      Sodium 138 mmol/L      Potassium 4.5 mmol/L      Chloride 102 mmol/L      CO2 25.0 mmol/L      Calcium 9.7 mg/dL      BUN/Creatinine Ratio 17.5     Anion Gap 11.0 mmol/L      eGFR 100.0 mL/min/1.73     Narrative:      GFR Normal >60  Chronic Kidney Disease " <60  Kidney Failure <15      CBC (No Diff) [[id number]]  (Abnormal) Collected: 08/10/24 2235    Specimen: Blood from Arm, Left Updated: 08/10/24 2242     WBC 9.16 10*3/mm3      RBC 4.63 10*6/mm3      Hemoglobin 15.2 g/dL      Hematocrit 45.7 %      MCV 98.7 fL      MCH 32.8 pg      MCHC 33.3 g/dL      RDW 11.6 %      RDW-SD 42.5 fl      MPV 10.0 fL      Platelets 235 10*3/mm3             Imaging Results (Last 24 Hours)       Procedure Component Value Units Date/Time    CT Cervical Spine Without Contrast [[id number]] Collected: 08/11/24 1158     Updated: 08/11/24 1203    Narrative:        CT CERVICAL SPINE WO CONTRAST    Date of Exam: 8/11/2024 11:18 AM EDT    Indication: cervical spine pain.    Comparison: None available.    Technique: Axial CT images were obtained of the cervical spine without contrast administration.  Sagittal and coronal reconstructions were performed.  Automated exposure control and iterative reconstruction methods were used.      Findings:  No acute fracture or traumatic malalignment.  Alignment: Maintained  Vertebral body height: Maintained  Disc spaces: Mild degenerative changes noted throughout the visualized spine, most significantly at C5-C6 and C6-C7.    Soft tissue: Normal. The visualized lung apices are clear.  Other Bones:No acute abnormality.        Impression:      Impression:  No acute abnormality.      Electronically Signed: Pritesh Mayo DO    8/11/2024 12:01 PM EDT    Workstation ID: [id number]               ASSESSMENT:      Principal Problem:    Pulmonary emboli  GERD  Odynophagia  Hoarseness  Coughing up black-tinged fluid    PLAN:  -PPI twice daily  - N.p.o. at midnight  - EGD tomorrow    I discussed the patients findings and my recommendations with the patient.  Phill Arevalo MD  08/11/24  17:30 EDT

## 2024-08-11 NOTE — CONSULTS
[facility]: [facility]         CONSULT NOTE    Patient Identification:  Jass Odell  44 y.o.  male  1980  [id number]            Requesting physician: Attending physician    Reason for Consultation: Dyspnea      History of Present Illness:  44-year-old male admitted on 8/10/2024 with shortness of breath and cough  CT chest on 8/10/2024 did not show any central or large PE there was questionable very small emboli in the distal subsegmental branches however it could be motion artifact  Patient had history of DVT around 5 years ago  Lower extremity Dopplers were negative      Assessment:    Cough productive of significant amount of black speckled secretions most likely related to GERD and possible esophagitis or GI bleed    CT scan of the chest no alveolar infiltrate  No pulmonary embolism    Significant history of GERD in the past even required heart cath    Lost about 30 pounds recently due to inability to eat due to GERD    Patient awaiting appointment with GI to repeat EGD last 1 was done over 5 years ago    Patient had history of DVT around 5 years ago  Lower extremity Dopplers were negative    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 Negative for wheezing and stridor.    Cardiovascular:  Negative for chest pain, palpitations and leg swelling.       Past Medical History:  Past Medical History:   Diagnosis Date    Anxiety     GERD (gastroesophageal reflux disease)        Past Surgical History:  History reviewed. No pertinent surgical history.     Home Meds:  Medications Prior to Admission   Medication Sig Dispense Refill Last Dose    escitalopram (LEXAPRO) 10 MG tablet Take 1  "tablet by mouth Daily.   8/10/2024    pantoprazole (PROTONIX) 40 MG EC tablet Take 1 tablet by mouth Daily.   8/10/2024       Allergies:  Allergies   Allergen Reactions    Prednisone GI Bleeding     PO prednisone causes GI bleeding per patient       Social History:   Social History     Socioeconomic History    Marital status:    Tobacco Use    Smoking status: Former     Types: Cigars    Smokeless tobacco: Former     Types: Chew   Vaping Use    Vaping status: Never Used   Substance and Sexual Activity    Alcohol use: Not Currently    Drug use: Not Currently     Types: Marijuana     Comment: occ    Sexual activity: Defer       Family History:  History reviewed. No pertinent family history.    Physical Exam:  /68 (BP Location: Left arm, Patient Position: Lying)   Pulse 59   Temp 98.4 °F (36.9 °C) (Oral)   Resp 16   Ht 180.3 cm (71\")   Wt 95.6 kg (210 lb 12.2 oz)   SpO2 97%   BMI 29.40 kg/m²  Body mass index is 29.4 kg/m². 97% 95.6 kg (210 lb 12.2 oz)  Physical Exam  Cardiovascular:      Heart sounds: No murmur heard.     No gallop.   Pulmonary:      Effort: No respiratory distress.      Breath sounds: No stridor. No wheezing, rhonchi or rales.   Chest:      Chest wall: No tenderness.         LABS:  Lab Results   Component Value Date    CALCIUM 9.7 08/10/2024     Results from last 7 days   Lab Units 08/10/24  2235 08/10/24  1303   MAGNESIUM mg/dL  --  2.2   SODIUM mmol/L 138 139   POTASSIUM mmol/L 4.5 4.4   CHLORIDE mmol/L 102 104   CO2 mmol/L 25.0 25.2   BUN mg/dL 17 15   CREATININE mg/dL 0.97 0.88   GLUCOSE mg/dL 191* 93   CALCIUM mg/dL 9.7 9.4   WBC 10*3/mm3 9.16 7.42   HEMOGLOBIN g/dL 15.2 14.9   PLATELETS 10*3/mm3 235 209   ALT (SGPT) U/L  --  20   AST (SGOT) U/L  --  21   PROBNP pg/mL  --  <36.0     Lab Results   Component Value Date    TROPONINT 7 08/10/2024     Results from last 7 days   Lab Units 08/10/24  1543 08/10/24  1303   HSTROP T ng/L 7 7     Results from last 7 days   Lab Units " "08/11/24  1240   RESPCX  Rejected                 Results from last 7 days   Lab Units 08/10/24  1303   INR  0.96     Results from last 7 days   Lab Units 08/11/24  1240   RESPCX  Rejected     No results found for: \"TSH\"  Estimated Creatinine Clearance: 117 mL/min (by C-G formula based on SCr of 0.97 mg/dL).         Imaging:  Imaging Results (Last 24 Hours)       Procedure Component Value Units Date/Time    CT Cervical Spine Without Contrast [[id number]] Collected: 08/11/24 1158     Updated: 08/11/24 1203    Narrative:        CT CERVICAL SPINE WO CONTRAST    Date of Exam: 8/11/2024 11:18 AM EDT    Indication: cervical spine pain.    Comparison: None available.    Technique: Axial CT images were obtained of the cervical spine without contrast administration.  Sagittal and coronal reconstructions were performed.  Automated exposure control and iterative reconstruction methods were used.      Findings:  No acute fracture or traumatic malalignment.  Alignment: Maintained  Vertebral body height: Maintained  Disc spaces: Mild degenerative changes noted throughout the visualized spine, most significantly at C5-C6 and C6-C7.    Soft tissue: Normal. The visualized lung apices are clear.  Other Bones:No acute abnormality.        Impression:      Impression:  No acute abnormality.      Electronically Signed: Pritesh Mayo DO    8/11/2024 12:01 PM EDT    Workstation ID: [id number]              Current Meds:   SCHEDULE  enoxaparin, 1 mg/kg, Subcutaneous, Q12H  escitalopram, 10 mg, Oral, Daily  pantoprazole, 40 mg, Oral, Daily  sodium chloride, 10 mL, Intravenous, Q12H      Infusions     PRNs    acetaminophen    Calcium Replacement - Follow Nurse / BPA Driven Protocol    Magnesium Standard Dose Replacement - Follow Nurse / BPA Driven Protocol    Phosphorus Replacement - Follow Nurse / BPA Driven Protocol    Potassium Replacement - Follow Nurse / BPA Driven Protocol    sodium chloride    sodium chloride    tiZANidine        Azmi " MD Verenice  8/11/2024  15:19 EDT      Much of this encounter note is an electronic transcription/translation of spoken language to printed text using Dragon Software.

## 2024-08-11 NOTE — PLAN OF CARE
Goal Outcome Evaluation:  Patient admitted this shift from ED, pulmonary consult called to Dr. Caldera's service, no complaints of pain or SOA, patient on room air with sat's maintaining in mid to high 90's

## 2024-08-12 ENCOUNTER — ANESTHESIA (OUTPATIENT)
Dept: GASTROENTEROLOGY | Facility: HOSPITAL | Age: 44
End: 2024-08-12
Payer: COMMERCIAL

## 2024-08-12 ENCOUNTER — ANESTHESIA EVENT (OUTPATIENT)
Dept: GASTROENTEROLOGY | Facility: HOSPITAL | Age: 44
End: 2024-08-12
Payer: COMMERCIAL

## 2024-08-12 ENCOUNTER — INPATIENT HOSPITAL (AMBULATORY)
Dept: URBAN - METROPOLITAN AREA HOSPITAL 84 | Facility: HOSPITAL | Age: 44
End: 2024-08-12
Payer: COMMERCIAL

## 2024-08-12 DIAGNOSIS — K21.00 GASTRO-ESOPHAGEAL REFLUX DISEASE WITH ESOPHAGITIS, WITHOUT B: ICD-10-CM

## 2024-08-12 PROCEDURE — 43235 EGD DIAGNOSTIC BRUSH WASH: CPT | Performed by: INTERNAL MEDICINE

## 2024-08-12 PROCEDURE — 25810000003 SODIUM CHLORIDE 0.9 % SOLUTION: Performed by: NURSE ANESTHETIST, CERTIFIED REGISTERED

## 2024-08-12 PROCEDURE — G0378 HOSPITAL OBSERVATION PER HR: HCPCS

## 2024-08-12 PROCEDURE — 25810000003 SODIUM CHLORIDE 0.9 % SOLUTION: Performed by: INTERNAL MEDICINE

## 2024-08-12 PROCEDURE — 25010000002 PROPOFOL 10 MG/ML EMULSION: Performed by: NURSE ANESTHETIST, CERTIFIED REGISTERED

## 2024-08-12 RX ORDER — ONDANSETRON 2 MG/ML
4 INJECTION INTRAMUSCULAR; INTRAVENOUS ONCE AS NEEDED
Status: DISCONTINUED | OUTPATIENT
Start: 2024-08-12 | End: 2024-08-12 | Stop reason: HOSPADM

## 2024-08-12 RX ORDER — MEPERIDINE HYDROCHLORIDE 25 MG/ML
12.5 INJECTION INTRAMUSCULAR; INTRAVENOUS; SUBCUTANEOUS
Status: DISCONTINUED | OUTPATIENT
Start: 2024-08-12 | End: 2024-08-12 | Stop reason: HOSPADM

## 2024-08-12 RX ORDER — EPHEDRINE SULFATE 5 MG/ML
5 INJECTION INTRAVENOUS ONCE AS NEEDED
Status: DISCONTINUED | OUTPATIENT
Start: 2024-08-12 | End: 2024-08-12 | Stop reason: HOSPADM

## 2024-08-12 RX ORDER — SODIUM CHLORIDE 9 MG/ML
INJECTION, SOLUTION INTRAVENOUS CONTINUOUS PRN
Status: DISCONTINUED | OUTPATIENT
Start: 2024-08-12 | End: 2024-08-12 | Stop reason: SURG

## 2024-08-12 RX ORDER — IPRATROPIUM BROMIDE AND ALBUTEROL SULFATE 2.5; .5 MG/3ML; MG/3ML
3 SOLUTION RESPIRATORY (INHALATION) ONCE AS NEEDED
Status: DISCONTINUED | OUTPATIENT
Start: 2024-08-12 | End: 2024-08-12 | Stop reason: HOSPADM

## 2024-08-12 RX ORDER — HYDRALAZINE HYDROCHLORIDE 20 MG/ML
5 INJECTION INTRAMUSCULAR; INTRAVENOUS
Status: DISCONTINUED | OUTPATIENT
Start: 2024-08-12 | End: 2024-08-12 | Stop reason: HOSPADM

## 2024-08-12 RX ORDER — LABETALOL HYDROCHLORIDE 5 MG/ML
5 INJECTION, SOLUTION INTRAVENOUS
Status: DISCONTINUED | OUTPATIENT
Start: 2024-08-12 | End: 2024-08-12 | Stop reason: HOSPADM

## 2024-08-12 RX ORDER — LIDOCAINE HYDROCHLORIDE 20 MG/ML
INJECTION, SOLUTION EPIDURAL; INFILTRATION; INTRACAUDAL; PERINEURAL AS NEEDED
Status: DISCONTINUED | OUTPATIENT
Start: 2024-08-12 | End: 2024-08-12 | Stop reason: SURG

## 2024-08-12 RX ORDER — SODIUM CHLORIDE 9 MG/ML
9 INJECTION, SOLUTION INTRAVENOUS ONCE
Status: COMPLETED | OUTPATIENT
Start: 2024-08-12 | End: 2024-08-12

## 2024-08-12 RX ORDER — LIDOCAINE 4 G/G
2 PATCH TOPICAL
Status: DISCONTINUED | OUTPATIENT
Start: 2024-08-12 | End: 2024-08-13 | Stop reason: HOSPADM

## 2024-08-12 RX ORDER — DIPHENHYDRAMINE HYDROCHLORIDE 50 MG/ML
12.5 INJECTION INTRAMUSCULAR; INTRAVENOUS
Status: DISCONTINUED | OUTPATIENT
Start: 2024-08-12 | End: 2024-08-12 | Stop reason: HOSPADM

## 2024-08-12 RX ADMIN — SODIUM CHLORIDE: 9 INJECTION, SOLUTION INTRAVENOUS at 15:43

## 2024-08-12 RX ADMIN — PANTOPRAZOLE SODIUM 40 MG: 40 TABLET, DELAYED RELEASE ORAL at 17:40

## 2024-08-12 RX ADMIN — Medication 10 ML: at 08:32

## 2024-08-12 RX ADMIN — LIDOCAINE 2 PATCH: 4 PATCH TOPICAL at 00:40

## 2024-08-12 RX ADMIN — LIDOCAINE HYDROCHLORIDE 80 MG: 20 INJECTION, SOLUTION EPIDURAL; INFILTRATION; INTRACAUDAL; PERINEURAL at 15:48

## 2024-08-12 RX ADMIN — SODIUM CHLORIDE 9 ML/HR: 9 INJECTION, SOLUTION INTRAVENOUS at 12:57

## 2024-08-12 RX ADMIN — Medication 10 ML: at 20:27

## 2024-08-12 RX ADMIN — PROPOFOL 100 MCG/KG/MIN: 10 INJECTION, EMULSION INTRAVENOUS at 15:48

## 2024-08-12 NOTE — PLAN OF CARE
Problem: Adult Inpatient Plan of Care  Goal: Plan of Care Review  Outcome: Ongoing, Not Progressing  Goal: Patient-Specific Goal (Individualized)  Outcome: Ongoing, Not Progressing  Goal: Absence of Hospital-Acquired Illness or Injury  Outcome: Ongoing, Not Progressing  Intervention: Identify and Manage Fall Risk  Recent Flowsheet Documentation  Taken 8/12/2024 1400 by Itzel Quesada RN  Safety Promotion/Fall Prevention: patient off unit  Taken 8/12/2024 1200 by Itzel Quesada RN  Safety Promotion/Fall Prevention: patient off unit  Taken 8/12/2024 1000 by Itzel Quesada RN  Safety Promotion/Fall Prevention: safety round/check completed  Taken 8/12/2024 0834 by Itzel Quesada RN  Safety Promotion/Fall Prevention: safety round/check completed  Intervention: Prevent Skin Injury  Recent Flowsheet Documentation  Taken 8/12/2024 0834 by Itzel Quesada RN  Body Position: position changed independently  Intervention: Prevent and Manage VTE (Venous Thromboembolism) Risk  Recent Flowsheet Documentation  Taken 8/12/2024 1150 by Itzel Quesada RN  VTE Prevention/Management:   bilateral   sequential compression devices off   patient refused intervention  Taken 8/12/2024 0834 by Itzel Quesada RN  Activity Management: up ad ha  Goal: Optimal Comfort and Wellbeing  Outcome: Ongoing, Not Progressing  Intervention: Provide Person-Centered Care  Recent Flowsheet Documentation  Taken 8/12/2024 0834 by Itzel Quesada RN  Trust Relationship/Rapport: care explained  Goal: Readiness for Transition of Care  Outcome: Ongoing, Not Progressing     Problem: Pain Acute  Goal: Acceptable Pain Control and Functional Ability  Outcome: Ongoing, Not Progressing   Goal Outcome Evaluation:

## 2024-08-12 NOTE — ANESTHESIA PREPROCEDURE EVALUATION
Anesthesia Evaluation     Patient summary reviewed and Nursing notes reviewed   no history of anesthetic complications:   NPO Solid Status: > 8 hours  NPO Liquid Status: > 4 hours           Airway   Mallampati: II  TM distance: >3 FB  Neck ROM: full  No difficulty expected  Dental - normal exam     Pulmonary - normal exam   (+) pulmonary embolism,  Cardiovascular - negative cardio ROS and normal exam        Neuro/Psych  (+) psychiatric history Anxiety  GI/Hepatic/Renal/Endo    (+) GERD    Musculoskeletal (-) negative ROS    Abdominal  - normal exam   Substance History - negative use     OB/GYN negative ob/gyn ROS         Other                          Anesthesia Plan    ASA 2     general     intravenous induction     Anesthetic plan, risks, benefits, and alternatives have been provided, discussed and informed consent has been obtained with: patient.    Plan discussed with CRNA.        CODE STATUS:    Code Status (Patient has no pulse and is not breathing): CPR (Attempt to Resuscitate)  Medical Interventions (Patient has pulse or is breathing): Full Support

## 2024-08-12 NOTE — PROGRESS NOTES
Daily Progress Note        Pulmonary emboli    Gastroesophageal reflux disease without esophagitis    Assessment:     Cough productive of significant amount of black speckled secretions most likely related to GERD and possible esophagitis or GI bleed     CT scan of the chest no alveolar infiltrate  No pulmonary embolism     Significant history of GERD in the past even required heart cath     Lost about 30 pounds recently due to inability to eat due to GERD     Patient awaiting appointment with GI to repeat EGD last 1 was done over 5 years ago     Patient had history of DVT around 5 years ago  Lower extremity Dopplers were negative     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     Recommendations:        Seen by GI plan for EGD today  Lost about 30 pounds recently due to inability to eat due to GERD     GI prophylaxis Protonix 40 mg daily     Pepcid 40 mg daily     DC Lovenox                LOS: 0 days     Subjective         Objective     Vital signs for last 24 hours:  Vitals:    08/11/24 1156 08/11/24 1628 08/11/24 2034 08/12/24 0443   BP:  128/69 112/68 98/62   BP Location:  Left arm Right arm Right arm   Patient Position:  Lying Lying Lying   Pulse: 59 67 63 63   Resp: 16 15 20 17   Temp: 98.4 °F (36.9 °C) 98.1 °F (36.7 °C) 97.9 °F (36.6 °C) 97.7 °F (36.5 °C)   TempSrc: Oral Oral Oral Oral   SpO2: 97% 95% 95% 95%   Weight:       Height:           Intake/Output last 3 shifts:  I/O last 3 completed shifts:  In: 1200 [P.O.:1200]  Out: 0   Intake/Output this shift:  No intake/output data recorded.      Radiology  Imaging Results (Last 24 Hours)       Procedure Component Value Units Date/Time    CT Cervical Spine Without Contrast [[id number]] Collected: 08/11/24 1158     Updated: 08/11/24 1203    Narrative:        CT CERVICAL SPINE WO CONTRAST    Date of Exam: 8/11/2024 11:18 AM EDT    Indication: cervical spine pain.    Comparison: None available.    Technique: Axial CT images were obtained of the cervical  spine without contrast administration.  Sagittal and coronal reconstructions were performed.  Automated exposure control and iterative reconstruction methods were used.      Findings:  No acute fracture or traumatic malalignment.  Alignment: Maintained  Vertebral body height: Maintained  Disc spaces: Mild degenerative changes noted throughout the visualized spine, most significantly at C5-C6 and C6-C7.    Soft tissue: Normal. The visualized lung apices are clear.  Other Bones:No acute abnormality.        Impression:      Impression:  No acute abnormality.      Electronically Signed: Pritesh Mayo DO    8/11/2024 12:01 PM EDT    Workstation ID: [id number]            Labs:  Results from last 7 days   Lab Units 08/10/24  2235   WBC 10*3/mm3 9.16   HEMOGLOBIN g/dL 15.2   HEMATOCRIT % 45.7   PLATELETS 10*3/mm3 235     Results from last 7 days   Lab Units 08/10/24  2235 08/10/24  1303   SODIUM mmol/L 138 139   POTASSIUM mmol/L 4.5 4.4   CHLORIDE mmol/L 102 104   CO2 mmol/L 25.0 25.2   BUN mg/dL 17 15   CREATININE mg/dL 0.97 0.88   CALCIUM mg/dL 9.7 9.4   BILIRUBIN mg/dL  --  1.0   ALK PHOS U/L  --  44   ALT (SGPT) U/L  --  20   AST (SGOT) U/L  --  21   GLUCOSE mg/dL 191* 93         Results from last 7 days   Lab Units 08/10/24  1303   ALBUMIN g/dL 4.5     Results from last 7 days   Lab Units 08/10/24  1543 08/10/24  1303   HSTROP T ng/L 7 7         Results from last 7 days   Lab Units 08/10/24  1303   MAGNESIUM mg/dL 2.2     Results from last 7 days   Lab Units 08/10/24  1303   INR  0.96   APTT seconds 26.8*               Meds:   SCHEDULE  escitalopram, 10 mg, Oral, Daily  Lidocaine, 2 patch, Transdermal, Q24H  pantoprazole, 40 mg, Oral, BID AC  sodium chloride, 10 mL, Intravenous, Q12H      Infusions     PRNs    acetaminophen    Calcium Replacement - Follow Nurse / BPA Driven Protocol    Magnesium Standard Dose Replacement - Follow Nurse / BPA Driven Protocol    Phosphorus Replacement - Follow Nurse / BPA Driven  Protocol    Potassium Replacement - Follow Nurse / BPA Driven Protocol    sodium chloride    sodium chloride    tiZANidine    Physical Exam:  Physical Exam  Cardiovascular:      Heart sounds: No murmur heard.     No gallop.   Pulmonary:      Effort: No respiratory distress.      Breath sounds: No stridor. No wheezing, rhonchi or rales.   Chest:      Chest wall: No tenderness.         ROS  Review of Systems   Respiratory:  Positive for cough. Negative for shortness of breath, wheezing and stridor.    Cardiovascular:  Negative for chest pain, palpitations and leg swelling.             Total time spent with patient greater than: 45 Minutes

## 2024-08-12 NOTE — CASE MANAGEMENT/SOCIAL WORK
Continued Stay Note  GUSTABO Ac[facility]     Patient Name: Jass Odell  MRN: [id number]  Today's Date: 8/12/2024    Admit Date: 8/10/2024    Plan: NEEDS CM ASSESSMENT   OFF FLOOR FOR PROCEDURE   Discharge Plan       Row Name 08/12/24 1500       Plan    Plan NEEDS CM ASSESSMENT   OFF FLOOR FOR PROCEDURE                             Expected Discharge Date and Time       Expected Discharge Date Expected Discharge Time    Aug 14, 2024           Ghada Edwards RN    [facility]  [email or web address]  Office [phone]  Cell [phone]

## 2024-08-12 NOTE — PLAN OF CARE
"Goal Outcome Evaluation:  Patient started on Lidocaine patch for complaints of neck/shoulder pain, patient was ordered Pantoprazole for GI GERD symptoms, however when time to administer med to patient, patient stated that medication \"bound\" him up too bad when he took it before and did want to speak with doctor about alternative, did have Pepcid given earlier that evening, patient has been NPO for planned EGD later today, consent filled out to be signed by patient  "

## 2024-08-12 NOTE — OP NOTE
ESOPHAGOGASTRODUODENOSCOPY Procedure Report    Patient Name:  Jass Odell  YOB: 1980    Date of Surgery:  8/12/2024     Pre-Op Diagnosis:  Gastroesophageal reflux disease without esophagitis [K21.9]    Post-Op Diagnosis:  1.  Grade B erosive esophagitis       Procedure/CPT® Codes:      Procedure(s):  ESOPHAGOGASTRODUODENOSCOPY    Staff:  Surgeon(s):  Oseas Pfeiffer MD      Anesthesia: Monitored Anesthesia Care    Description of Procedure:  A description of the procedure as well as risks, benefits and alternative methods were explained to the patient who voiced understanding and signed the corresponding consent form. A physical exam was performed and vital signs were monitored throughout the procedure.    After informed consent was obtained, the patient was placed in the left lateral decubitus position and sedated as above.  The Olympus video gastroscope was inserted into the oropharynx and the esophagus was intubated without difficulty.  Examination of the esophagus, stomach, pylorus, duodenal bulb, and second portion of the duodenum was performed without difficulty. The scope was then retroflexed and the fundus was visualized. The procedure was not difficult and there were no immediate complications.  There was no blood loss.    Findings:  Esophagus: Erosion of the GE junction consistent with grade B erosive esophagitis.  No stricture or hiatal hernia noted.  Stomach: Normal  Duodenum: Normal    Impression:  1.  Grade B erosive esophagitis    Recommendations:  1.  Regular diet as tolerated  2.  Proton pump inhibitor twice daily for 1 month, then once daily  3.  Okay for discharge home when tolerating diet  4.  Follow-up in my office as an outpatient in 3 months      Oseas Pfeiffer MD     Date: 8/12/2024    Time: 15:53 EDT      .

## 2024-08-12 NOTE — DISCHARGE SUMMARY
[facility] MEDICINE SERVICE  DAILY PROGRESS NOTE    NAME: Jass Odell  : 1980  MRN: [id number]      LOS: 0 days     PROVIDER OF SERVICE: Doroteo Gambino MD    Chief Complaint: Pulmonary emboli    Subjective:     Interval History:  History taken from: patient chart  Patient still with some cough with dark sputum.  This continues to occur in the mornings mostly when he wakes up.    Review of Systems:   Review of Systems   Constitutional:  Negative for chills, fatigue and fever.   Respiratory:  Positive for cough and shortness of breath. Negative for chest tightness and wheezing.    Cardiovascular:  Negative for chest pain, palpitations and leg swelling.   Neurological:  Negative for dizziness and headaches.     Objective:     Vital Signs  Temp:  [97.6 °F (36.4 °C)-98.8 °F (37.1 °C)] 98.8 °F (37.1 °C)  Heart Rate:  [58-67] 58  Resp:  [15-20] 16  BP: ()/(50-71) 116/64   Body mass index is 29.4 kg/m².    Physical Exam  Physical Exam  Constitutional:       Appearance: Normal appearance.   HENT:      Head: Normocephalic and atraumatic.      Nose: Nose normal.      Mouth/Throat:      Mouth: Mucous membranes are moist.   Eyes:      Extraocular Movements: Extraocular movements intact.      Conjunctiva/sclera: Conjunctivae normal.      Pupils: Pupils are equal, round, and reactive to light.   Cardiovascular:      Rate and Rhythm: Normal rate and regular rhythm.      Pulses: Normal pulses.      Heart sounds: Normal heart sounds.   Pulmonary:      Effort: Pulmonary effort is normal.      Breath sounds: Decreased breath sounds present.   Abdominal:      General: Abdomen is flat. Bowel sounds are normal.      Palpations: Abdomen is soft.   Musculoskeletal:         General: Normal range of motion.  Patient with tenderness to SCM     Cervical back: Normal range of motion.   Skin:     General: Skin is warm and dry.   Neurological:      General: No focal deficit present.      Mental Status: He is alert and  oriented to person, place, and time. Mental status is at baseline.   Psychiatric:         Mood and Affect: Mood normal.         Behavior: Behavior normal.         Thought Content: Thought content normal.         Judgment: Judgment normal.     Scheduled Meds   escitalopram, 10 mg, Oral, Daily  Lidocaine, 2 patch, Transdermal, Q24H  pantoprazole, 40 mg, Oral, BID AC  sodium chloride, 10 mL, Intravenous, Q12H       PRN Meds     acetaminophen    atropine    Calcium Replacement - Follow Nurse / BPA Driven Protocol    diphenhydrAMINE    ePHEDrine Sulfate (Pressors)    hydrALAZINE    ipratropium-albuterol    labetalol    lidocaine (cardiac)    Magnesium Standard Dose Replacement - Follow Nurse / BPA Driven Protocol    meperidine    ondansetron    Phosphorus Replacement - Follow Nurse / BPA Driven Protocol    Potassium Replacement - Follow Nurse / BPA Driven Protocol    sodium chloride    sodium chloride    tiZANidine   Infusions         Diagnostic Data    Results from last 7 days   Lab Units 08/10/24  2235 08/10/24  1303   WBC 10*3/mm3 9.16 7.42   HEMOGLOBIN g/dL 15.2 14.9   HEMATOCRIT % 45.7 44.7   PLATELETS 10*3/mm3 235 209   GLUCOSE mg/dL 191* 93   CREATININE mg/dL 0.97 0.88   BUN mg/dL 17 15   SODIUM mmol/L 138 139   POTASSIUM mmol/L 4.5 4.4   AST (SGOT) U/L  --  21   ALT (SGPT) U/L  --  20   ALK PHOS U/L  --  44   BILIRUBIN mg/dL  --  1.0   ANION GAP mmol/L 11.0 9.8       CT Cervical Spine Without Contrast    Result Date: 8/11/2024  Impression: No acute abnormality. Electronically Signed: Pritesh Mayo DO  8/11/2024 12:01 PM EDT  Workstation ID: [id number]       I reviewed the patient's new clinical results.  I reviewed the patient's new imaging results and agree with the interpretation.    Assessment/Plan:     Active and Resolved Problems  Active Hospital Problems    Diagnosis  POA    **Pulmonary emboli [I26.99]  Yes    Gastroesophageal reflux disease without esophagitis [K21.9]  Unknown      Resolved Hospital  Problems   No resolved problems to display.       Pulmonary emboli  - Questionable small emboli noted on CT scan dated 8/10/2024  - Continue with Lovenox, as patient has history of DVT  - Doppler ultrasound of bilateral lower extremities was negative  - Patient continues with brown sputum, no recent travel; sputum culture ordered  - Pulmonology consulted  - Continue cardiac monitoring    Possible upper GI bleed  -Patient's cough and dark sputum could be related to esophagitis or gastritis with minor bleeding  -I do not feel patient is actively having a GI bleed as he has no evidence of melena  -GI consulted; planning for EGD today  -Continue PPI; patient cannot tolerate twice daily PPI although we could add Pepcid in addition to Protonix    Neck pain  - Cervical CT without any acute pathology  - Zanaflex muscle relaxer as needed; improved pain    Depression/anxiety  - Continue home regimen of Lexapro    GERD  - Continue Protonix    VTE Prophylaxis:  No VTE prophylaxis order currently exists.    Seen and evaluated patient.  Patient states that most of his symptoms have been ongoing for several months.  Patient states that this time that he is no longer having any dyspnea, however continues to have burning chest pain to left chest.  Patient does complain of bilateral ear pain, with some drainage, as well as some STM pain with TTP.  Will order muscle relaxer to see if this helps as well as cervical neck CT to rule out any abnormalities.  Cardiology workup at this point has been essentially normal, except for questionable small emboli noted on CT scan.  Will have pulmonology evaluate patient to determine if patient may require bronchoscopy.  Discussed plan of care with patient and patient agrees with plan.    Code status is   Code Status and Medical Interventions: CPR (Attempt to Resuscitate); Full Support   Ordered at: 08/10/24 1512     Code Status (Patient has no pulse and is not breathing):    CPR (Attempt to  Resuscitate)     Medical Interventions (Patient has pulse or is breathing):    Full Support       Plan for disposition: Hopefully DC on 8/13/2024    Time: 30 minutes    Signature: Electronically signed by Doroteo Gambino MD, 08/12/24, 15:19 EDT.  [facility] Hospitalist Team

## 2024-08-13 VITALS
WEIGHT: 210.76 LBS | BODY MASS INDEX: 29.51 KG/M2 | DIASTOLIC BLOOD PRESSURE: 65 MMHG | OXYGEN SATURATION: 97 % | SYSTOLIC BLOOD PRESSURE: 111 MMHG | RESPIRATION RATE: 21 BRPM | HEART RATE: 57 BPM | TEMPERATURE: 98.2 F | HEIGHT: 71 IN

## 2024-08-13 PROCEDURE — G0378 HOSPITAL OBSERVATION PER HR: HCPCS

## 2024-08-13 RX ORDER — LIDOCAINE 4 G/G
2 PATCH TOPICAL
Qty: 30 PATCH | Refills: 0 | Status: SHIPPED | OUTPATIENT
Start: 2024-08-14

## 2024-08-13 RX ORDER — TIZANIDINE 4 MG/1
4 TABLET ORAL EVERY 8 HOURS PRN
Qty: 90 TABLET | Refills: 0 | Status: SHIPPED | OUTPATIENT
Start: 2024-08-13

## 2024-08-13 RX ORDER — AMOXICILLIN 250 MG
1 CAPSULE ORAL DAILY
Qty: 60 TABLET | Refills: 0 | Status: SHIPPED | OUTPATIENT
Start: 2024-08-13

## 2024-08-13 RX ORDER — PANTOPRAZOLE SODIUM 40 MG/1
TABLET, DELAYED RELEASE ORAL
Qty: 90 TABLET | Refills: 1 | Status: SHIPPED | OUTPATIENT
Start: 2024-08-13 | End: 2025-09-12

## 2024-08-13 RX ADMIN — PANTOPRAZOLE SODIUM 40 MG: 40 TABLET, DELAYED RELEASE ORAL at 08:15

## 2024-08-13 RX ADMIN — ESCITALOPRAM OXALATE 10 MG: 10 TABLET ORAL at 08:15

## 2024-08-13 RX ADMIN — LIDOCAINE 1 PATCH: 4 PATCH TOPICAL at 08:15

## 2024-08-13 RX ADMIN — Medication 10 ML: at 08:16

## 2024-08-13 NOTE — PLAN OF CARE
Goal Outcome Evaluation:         Patient is doing well. Has had no complaints of pain tonight. Has been up ad ha with no issues. Care continuing.

## 2024-08-13 NOTE — ANESTHESIA POSTPROCEDURE EVALUATION
Patient: Jass Odell    Procedure Summary       Date: 08/12/24 Room / Location: [facility] ENDOSCOPY 1 / [facility] ENDOSCOPY    Anesthesia Start: 1543 Anesthesia Stop: 1559    Procedure: ESOPHAGOGASTRODUODENOSCOPY Diagnosis:       Gastroesophageal reflux disease without esophagitis      (Gastroesophageal reflux disease without esophagitis [K21.9])    Surgeons: Oseas Pfeiffer MD Provider: Brad Gomez MD    Anesthesia Type: general ASA Status: 2            Anesthesia Type: general    Vitals  Vitals Value Taken Time   /67 08/12/24 1621   Temp     Pulse 52 08/12/24 1622   Resp 10 08/12/24 1610   SpO2 97 % 08/12/24 1622   Vitals shown include unfiled device data.        Post Anesthesia Care and Evaluation    Patient location during evaluation: PACU  Patient participation: complete - patient participated  Level of consciousness: awake  Pain scale: See nurse's notes for pain score.  Pain management: adequate    Airway patency: patent  Anesthetic complications: No anesthetic complications  PONV Status: none  Cardiovascular status: acceptable  Respiratory status: acceptable and spontaneous ventilation  Hydration status: acceptable    Comments: Patient seen and examined postoperatively; vital signs stable; SpO2 greater than or equal to 90%; cardiopulmonary status stable; nausea/vomiting adequately controlled; pain adequately controlled; no apparent anesthesia complications; patient discharged from anesthesia care when discharge criteria were met

## 2024-08-13 NOTE — CASE MANAGEMENT/SOCIAL WORK
Case Management Discharge Note      Final Note: HOME         Selected Continued Care - Discharged on 8/13/2024 Admission date: 8/10/2024 - Discharge disposition: Home or Self Care            Transportation Services  Private: Car    Final Discharge Disposition Code: 01 - home or self-care

## 2024-08-13 NOTE — PROGRESS NOTES
Daily Progress Note        Pulmonary emboli    Gastroesophageal reflux disease without esophagitis    Assessment:     Cough productive of significant amount of black speckled secretions most likely related to GERD and possible esophagitis or GI bleed     CT scan of the chest no alveolar infiltrate  No pulmonary embolism     Significant history of GERD in the past even required heart cath     Lost about 30 pounds recently due to inability to eat due to GERD     Patient awaiting appointment with GI to repeat EGD last 1 was done over 5 years ago     Patient had history of DVT around 5 years ago  Lower extremity Dopplers were negative     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     Recommendations:        Seen by GI plan for EGD today  Lost about 30 pounds recently due to inability to eat due to GERD     GI prophylaxis Protonix 40 mg daily     Pepcid 40 mg daily     DC Lovenox                LOS: 0 days     Subjective         Objective     Vital signs for last 24 hours:  Vitals:    08/12/24 2001 08/13/24 0019 08/13/24 0452 08/13/24 1116   BP: 110/67 96/57 101/54 111/65   BP Location: Left arm Right arm Right arm Left arm   Patient Position: Lying Lying Lying Lying   Pulse:   54 57   Resp: 13 15 17 21   Temp: 98 °F (36.7 °C) 97.7 °F (36.5 °C) 97.9 °F (36.6 °C) 98.2 °F (36.8 °C)   TempSrc: Oral Oral Oral Oral   SpO2: 95% 95% 95% 97%   Weight:       Height:           Intake/Output last 3 shifts:  I/O last 3 completed shifts:  In: 800 [P.O.:600; I.V.:200]  Out: -   Intake/Output this shift:  I/O this shift:  In: 240 [P.O.:240]  Out: -       Radiology  Imaging Results (Last 24 Hours)       ** No results found for the last 24 hours. **            Labs:  Results from last 7 days   Lab Units 08/10/24  2235   WBC 10*3/mm3 9.16   HEMOGLOBIN g/dL 15.2   HEMATOCRIT % 45.7   PLATELETS 10*3/mm3 235     Results from last 7 days   Lab Units 08/10/24  2235 08/10/24  1303   SODIUM mmol/L 138 139   POTASSIUM mmol/L 4.5 4.4    CHLORIDE mmol/L 102 104   CO2 mmol/L 25.0 25.2   BUN mg/dL 17 15   CREATININE mg/dL 0.97 0.88   CALCIUM mg/dL 9.7 9.4   BILIRUBIN mg/dL  --  1.0   ALK PHOS U/L  --  44   ALT (SGPT) U/L  --  20   AST (SGOT) U/L  --  21   GLUCOSE mg/dL 191* 93         Results from last 7 days   Lab Units 08/10/24  1303   ALBUMIN g/dL 4.5     Results from last 7 days   Lab Units 08/10/24  1543 08/10/24  1303   HSTROP T ng/L 7 7         Results from last 7 days   Lab Units 08/10/24  1303   MAGNESIUM mg/dL 2.2     Results from last 7 days   Lab Units 08/10/24  1303   INR  0.96   APTT seconds 26.8*               Meds:   SCHEDULE  escitalopram, 10 mg, Oral, Daily  Lidocaine, 2 patch, Transdermal, Q24H  pantoprazole, 40 mg, Oral, BID AC  sodium chloride, 10 mL, Intravenous, Q12H      Infusions     PRNs    acetaminophen    Calcium Replacement - Follow Nurse / BPA Driven Protocol    Magnesium Standard Dose Replacement - Follow Nurse / BPA Driven Protocol    Phosphorus Replacement - Follow Nurse / BPA Driven Protocol    Potassium Replacement - Follow Nurse / BPA Driven Protocol    sodium chloride    sodium chloride    tiZANidine    Physical Exam:  Physical Exam  Cardiovascular:      Heart sounds: No murmur heard.     No gallop.   Pulmonary:      Effort: No respiratory distress.      Breath sounds: No stridor. No wheezing, rhonchi or rales.   Chest:      Chest wall: No tenderness.         ROS  Review of Systems   Respiratory:  Positive for cough. Negative for shortness of breath, wheezing and stridor.    Cardiovascular:  Negative for chest pain, palpitations and leg swelling.             Total time spent with patient greater than: 45 Minutes

## 2024-08-29 ENCOUNTER — OFFICE (AMBULATORY)
Age: 44
End: 2024-08-29
Payer: COMMERCIAL

## 2024-08-29 ENCOUNTER — ON CAMPUS - OUTPATIENT (AMBULATORY)
Age: 44
End: 2024-08-29
Payer: COMMERCIAL

## 2024-08-29 ENCOUNTER — ON CAMPUS - OUTPATIENT (AMBULATORY)
Dept: URBAN - METROPOLITAN AREA HOSPITAL 2 | Facility: HOSPITAL | Age: 44
End: 2024-08-29
Payer: COMMERCIAL

## 2024-08-29 ENCOUNTER — OFFICE (AMBULATORY)
Dept: URBAN - METROPOLITAN AREA PATHOLOGY 19 | Facility: PATHOLOGY | Age: 44
End: 2024-08-29
Payer: COMMERCIAL

## 2024-08-29 VITALS
OXYGEN SATURATION: 96 % | OXYGEN SATURATION: 100 % | SYSTOLIC BLOOD PRESSURE: 133 MMHG | WEIGHT: 219 LBS | SYSTOLIC BLOOD PRESSURE: 107 MMHG | WEIGHT: 219 LBS | DIASTOLIC BLOOD PRESSURE: 78 MMHG | SYSTOLIC BLOOD PRESSURE: 103 MMHG | SYSTOLIC BLOOD PRESSURE: 107 MMHG | RESPIRATION RATE: 17 BRPM | HEART RATE: 80 BPM | SYSTOLIC BLOOD PRESSURE: 107 MMHG | OXYGEN SATURATION: 99 % | WEIGHT: 219 LBS | DIASTOLIC BLOOD PRESSURE: 62 MMHG | SYSTOLIC BLOOD PRESSURE: 130 MMHG | DIASTOLIC BLOOD PRESSURE: 61 MMHG | HEART RATE: 63 BPM | SYSTOLIC BLOOD PRESSURE: 130 MMHG | SYSTOLIC BLOOD PRESSURE: 107 MMHG | HEIGHT: 72 IN | HEART RATE: 58 BPM | RESPIRATION RATE: 17 BRPM | HEART RATE: 58 BPM | RESPIRATION RATE: 16 BRPM | RESPIRATION RATE: 16 BRPM | HEART RATE: 65 BPM | OXYGEN SATURATION: 98 % | DIASTOLIC BLOOD PRESSURE: 83 MMHG | HEART RATE: 70 BPM | HEART RATE: 60 BPM | WEIGHT: 219 LBS | HEIGHT: 72 IN | DIASTOLIC BLOOD PRESSURE: 76 MMHG | DIASTOLIC BLOOD PRESSURE: 58 MMHG | SYSTOLIC BLOOD PRESSURE: 127 MMHG | HEART RATE: 60 BPM | OXYGEN SATURATION: 98 % | DIASTOLIC BLOOD PRESSURE: 83 MMHG | HEART RATE: 60 BPM | SYSTOLIC BLOOD PRESSURE: 127 MMHG | SYSTOLIC BLOOD PRESSURE: 99 MMHG | HEART RATE: 55 BPM | RESPIRATION RATE: 14 BRPM | DIASTOLIC BLOOD PRESSURE: 78 MMHG | OXYGEN SATURATION: 100 % | SYSTOLIC BLOOD PRESSURE: 126 MMHG | DIASTOLIC BLOOD PRESSURE: 62 MMHG | DIASTOLIC BLOOD PRESSURE: 58 MMHG | HEART RATE: 60 BPM | RESPIRATION RATE: 14 BRPM | DIASTOLIC BLOOD PRESSURE: 83 MMHG | SYSTOLIC BLOOD PRESSURE: 103 MMHG | RESPIRATION RATE: 17 BRPM | SYSTOLIC BLOOD PRESSURE: 127 MMHG | DIASTOLIC BLOOD PRESSURE: 62 MMHG | HEART RATE: 80 BPM | HEIGHT: 72 IN | DIASTOLIC BLOOD PRESSURE: 78 MMHG | OXYGEN SATURATION: 96 % | HEART RATE: 80 BPM | RESPIRATION RATE: 17 BRPM | SYSTOLIC BLOOD PRESSURE: 130 MMHG | SYSTOLIC BLOOD PRESSURE: 133 MMHG | DIASTOLIC BLOOD PRESSURE: 62 MMHG | DIASTOLIC BLOOD PRESSURE: 58 MMHG | DIASTOLIC BLOOD PRESSURE: 61 MMHG | DIASTOLIC BLOOD PRESSURE: 83 MMHG | SYSTOLIC BLOOD PRESSURE: 126 MMHG | OXYGEN SATURATION: 100 % | DIASTOLIC BLOOD PRESSURE: 61 MMHG | OXYGEN SATURATION: 98 % | HEART RATE: 80 BPM | HEART RATE: 55 BPM | HEART RATE: 58 BPM | RESPIRATION RATE: 17 BRPM | SYSTOLIC BLOOD PRESSURE: 127 MMHG | HEART RATE: 63 BPM | HEART RATE: 65 BPM | RESPIRATION RATE: 14 BRPM | HEART RATE: 63 BPM | SYSTOLIC BLOOD PRESSURE: 126 MMHG | RESPIRATION RATE: 18 BRPM | SYSTOLIC BLOOD PRESSURE: 133 MMHG | DIASTOLIC BLOOD PRESSURE: 83 MMHG | HEART RATE: 63 BPM | DIASTOLIC BLOOD PRESSURE: 76 MMHG | HEART RATE: 70 BPM | DIASTOLIC BLOOD PRESSURE: 62 MMHG | RESPIRATION RATE: 14 BRPM | WEIGHT: 219 LBS | RESPIRATION RATE: 16 BRPM | RESPIRATION RATE: 17 BRPM | DIASTOLIC BLOOD PRESSURE: 58 MMHG | DIASTOLIC BLOOD PRESSURE: 58 MMHG | SYSTOLIC BLOOD PRESSURE: 126 MMHG | DIASTOLIC BLOOD PRESSURE: 83 MMHG | RESPIRATION RATE: 16 BRPM | RESPIRATION RATE: 16 BRPM | OXYGEN SATURATION: 100 % | DIASTOLIC BLOOD PRESSURE: 62 MMHG | SYSTOLIC BLOOD PRESSURE: 103 MMHG | OXYGEN SATURATION: 96 % | DIASTOLIC BLOOD PRESSURE: 78 MMHG | DIASTOLIC BLOOD PRESSURE: 83 MMHG | OXYGEN SATURATION: 96 % | HEART RATE: 65 BPM | SYSTOLIC BLOOD PRESSURE: 133 MMHG | RESPIRATION RATE: 14 BRPM | SYSTOLIC BLOOD PRESSURE: 99 MMHG | RESPIRATION RATE: 18 BRPM | HEART RATE: 60 BPM | DIASTOLIC BLOOD PRESSURE: 76 MMHG | SYSTOLIC BLOOD PRESSURE: 133 MMHG | HEART RATE: 58 BPM | HEART RATE: 63 BPM | HEART RATE: 70 BPM | RESPIRATION RATE: 16 BRPM | RESPIRATION RATE: 18 BRPM | TEMPERATURE: 98.2 F | SYSTOLIC BLOOD PRESSURE: 103 MMHG | SYSTOLIC BLOOD PRESSURE: 133 MMHG | SYSTOLIC BLOOD PRESSURE: 130 MMHG | RESPIRATION RATE: 18 BRPM | DIASTOLIC BLOOD PRESSURE: 78 MMHG | WEIGHT: 219 LBS | SYSTOLIC BLOOD PRESSURE: 127 MMHG | SYSTOLIC BLOOD PRESSURE: 99 MMHG | SYSTOLIC BLOOD PRESSURE: 99 MMHG | OXYGEN SATURATION: 100 % | DIASTOLIC BLOOD PRESSURE: 78 MMHG | SYSTOLIC BLOOD PRESSURE: 126 MMHG | SYSTOLIC BLOOD PRESSURE: 107 MMHG | DIASTOLIC BLOOD PRESSURE: 76 MMHG | SYSTOLIC BLOOD PRESSURE: 126 MMHG | RESPIRATION RATE: 16 BRPM | DIASTOLIC BLOOD PRESSURE: 76 MMHG | HEART RATE: 70 BPM | RESPIRATION RATE: 14 BRPM | SYSTOLIC BLOOD PRESSURE: 130 MMHG | HEART RATE: 63 BPM | SYSTOLIC BLOOD PRESSURE: 126 MMHG | HEART RATE: 60 BPM | SYSTOLIC BLOOD PRESSURE: 116 MMHG | HEART RATE: 80 BPM | RESPIRATION RATE: 18 BRPM | OXYGEN SATURATION: 99 % | HEART RATE: 65 BPM | HEART RATE: 80 BPM | HEART RATE: 55 BPM | DIASTOLIC BLOOD PRESSURE: 61 MMHG | HEIGHT: 72 IN | WEIGHT: 219 LBS | OXYGEN SATURATION: 98 % | HEART RATE: 65 BPM | DIASTOLIC BLOOD PRESSURE: 61 MMHG | TEMPERATURE: 98.2 F | HEART RATE: 58 BPM | HEART RATE: 70 BPM | DIASTOLIC BLOOD PRESSURE: 61 MMHG | OXYGEN SATURATION: 96 % | SYSTOLIC BLOOD PRESSURE: 103 MMHG | HEIGHT: 72 IN | DIASTOLIC BLOOD PRESSURE: 61 MMHG | OXYGEN SATURATION: 100 % | SYSTOLIC BLOOD PRESSURE: 107 MMHG | HEART RATE: 65 BPM | RESPIRATION RATE: 14 BRPM | OXYGEN SATURATION: 98 % | RESPIRATION RATE: 17 BRPM | SYSTOLIC BLOOD PRESSURE: 99 MMHG | SYSTOLIC BLOOD PRESSURE: 116 MMHG | SYSTOLIC BLOOD PRESSURE: 127 MMHG | HEART RATE: 70 BPM | HEART RATE: 55 BPM | SYSTOLIC BLOOD PRESSURE: 116 MMHG | OXYGEN SATURATION: 98 % | SYSTOLIC BLOOD PRESSURE: 99 MMHG | RESPIRATION RATE: 18 BRPM | HEART RATE: 55 BPM | TEMPERATURE: 98.2 F | HEART RATE: 63 BPM | TEMPERATURE: 98.2 F | OXYGEN SATURATION: 99 % | OXYGEN SATURATION: 99 % | OXYGEN SATURATION: 96 % | SYSTOLIC BLOOD PRESSURE: 116 MMHG | DIASTOLIC BLOOD PRESSURE: 62 MMHG | HEIGHT: 72 IN | TEMPERATURE: 98.2 F | OXYGEN SATURATION: 99 % | SYSTOLIC BLOOD PRESSURE: 103 MMHG | SYSTOLIC BLOOD PRESSURE: 130 MMHG | HEART RATE: 55 BPM | HEART RATE: 60 BPM | SYSTOLIC BLOOD PRESSURE: 116 MMHG | DIASTOLIC BLOOD PRESSURE: 76 MMHG | TEMPERATURE: 98.2 F | HEART RATE: 65 BPM | OXYGEN SATURATION: 99 % | SYSTOLIC BLOOD PRESSURE: 99 MMHG | HEART RATE: 70 BPM | DIASTOLIC BLOOD PRESSURE: 58 MMHG | SYSTOLIC BLOOD PRESSURE: 127 MMHG | OXYGEN SATURATION: 99 % | SYSTOLIC BLOOD PRESSURE: 116 MMHG | SYSTOLIC BLOOD PRESSURE: 116 MMHG | HEART RATE: 58 BPM | DIASTOLIC BLOOD PRESSURE: 58 MMHG | SYSTOLIC BLOOD PRESSURE: 130 MMHG | SYSTOLIC BLOOD PRESSURE: 133 MMHG | DIASTOLIC BLOOD PRESSURE: 78 MMHG | OXYGEN SATURATION: 96 % | OXYGEN SATURATION: 100 % | SYSTOLIC BLOOD PRESSURE: 103 MMHG | RESPIRATION RATE: 18 BRPM | TEMPERATURE: 98.2 F | DIASTOLIC BLOOD PRESSURE: 76 MMHG | HEIGHT: 72 IN | HEART RATE: 58 BPM | SYSTOLIC BLOOD PRESSURE: 107 MMHG | OXYGEN SATURATION: 98 % | HEART RATE: 80 BPM | HEART RATE: 55 BPM

## 2024-08-29 DIAGNOSIS — K64.1 SECOND DEGREE HEMORRHOIDS: ICD-10-CM

## 2024-08-29 DIAGNOSIS — R19.4 CHANGE IN BOWEL HABIT: ICD-10-CM

## 2024-08-29 LAB
GI HISTOLOGY: A. WHOLE COLON: (no result)
GI HISTOLOGY: PDF REPORT: (no result)

## 2024-08-29 PROCEDURE — 45380 COLONOSCOPY AND BIOPSY: CPT | Performed by: INTERNAL MEDICINE

## 2024-08-29 PROCEDURE — 88305 TISSUE EXAM BY PATHOLOGIST: CPT | Performed by: PATHOLOGY

## 2024-10-25 ENCOUNTER — OFFICE (AMBULATORY)
Age: 44
End: 2024-10-25
Payer: COMMERCIAL

## 2024-10-25 ENCOUNTER — OFFICE (AMBULATORY)
Dept: URBAN - METROPOLITAN AREA CLINIC 64 | Facility: CLINIC | Age: 44
End: 2024-10-25
Payer: COMMERCIAL

## 2024-10-25 VITALS
SYSTOLIC BLOOD PRESSURE: 105 MMHG | HEIGHT: 72 IN | SYSTOLIC BLOOD PRESSURE: 105 MMHG | SYSTOLIC BLOOD PRESSURE: 105 MMHG | DIASTOLIC BLOOD PRESSURE: 65 MMHG | WEIGHT: 254 LBS | HEIGHT: 72 IN | DIASTOLIC BLOOD PRESSURE: 65 MMHG | SYSTOLIC BLOOD PRESSURE: 105 MMHG | DIASTOLIC BLOOD PRESSURE: 65 MMHG | HEIGHT: 72 IN | DIASTOLIC BLOOD PRESSURE: 65 MMHG | WEIGHT: 254 LBS | HEART RATE: 53 BPM | HEIGHT: 72 IN | HEIGHT: 72 IN | DIASTOLIC BLOOD PRESSURE: 65 MMHG | HEIGHT: 72 IN | SYSTOLIC BLOOD PRESSURE: 105 MMHG | HEART RATE: 53 BPM | DIASTOLIC BLOOD PRESSURE: 65 MMHG | HEART RATE: 53 BPM | WEIGHT: 254 LBS | HEART RATE: 53 BPM | HEART RATE: 53 BPM | HEIGHT: 72 IN | SYSTOLIC BLOOD PRESSURE: 105 MMHG | SYSTOLIC BLOOD PRESSURE: 105 MMHG | WEIGHT: 254 LBS | HEART RATE: 53 BPM | WEIGHT: 254 LBS | WEIGHT: 254 LBS | WEIGHT: 254 LBS | HEART RATE: 53 BPM | DIASTOLIC BLOOD PRESSURE: 65 MMHG

## 2024-10-25 DIAGNOSIS — R07.9 CHEST PAIN, UNSPECIFIED: ICD-10-CM

## 2024-10-25 DIAGNOSIS — K62.5 HEMORRHAGE OF ANUS AND RECTUM: ICD-10-CM

## 2024-10-25 PROCEDURE — 99212 OFFICE O/P EST SF 10 MIN: CPT | Performed by: NURSE PRACTITIONER

## 2025-05-17 ENCOUNTER — APPOINTMENT (OUTPATIENT)
Dept: GENERAL RADIOLOGY | Facility: HOSPITAL | Age: 45
End: 2025-05-17
Payer: COMMERCIAL

## 2025-05-17 ENCOUNTER — HOSPITAL ENCOUNTER (OUTPATIENT)
Facility: HOSPITAL | Age: 45
Setting detail: OBSERVATION
Discharge: HOME | End: 2025-05-18
Attending: EMERGENCY MEDICINE | Admitting: EMERGENCY MEDICINE
Payer: COMMERCIAL

## 2025-05-17 DIAGNOSIS — R11.0 NAUSEA: ICD-10-CM

## 2025-05-17 DIAGNOSIS — R10.13 EPIGASTRIC PAIN: Primary | ICD-10-CM

## 2025-05-17 DIAGNOSIS — R00.1 SINUS BRADYCARDIA: ICD-10-CM

## 2025-05-17 LAB
ALBUMIN SERPL-MCNC: 4.5 G/DL (ref 3.5–5.2)
ALBUMIN/GLOB SERPL: 2.1 G/DL
ALP SERPL-CCNC: 45 U/L (ref 39–117)
ALT SERPL W P-5'-P-CCNC: 19 U/L (ref 1–41)
AMPHET+METHAMPHET UR QL: NEGATIVE
AMPHETAMINES UR QL: NEGATIVE
ANION GAP SERPL CALCULATED.3IONS-SCNC: 8.9 MMOL/L (ref 5–15)
AST SERPL-CCNC: 18 U/L (ref 1–40)
BARBITURATES UR QL SCN: NEGATIVE
BASOPHILS # BLD AUTO: 0.03 10*3/MM3 (ref 0–0.2)
BASOPHILS NFR BLD AUTO: 0.4 % (ref 0–1.5)
BENZODIAZ UR QL SCN: NEGATIVE
BILIRUB SERPL-MCNC: 1.6 MG/DL (ref 0–1.2)
BILIRUB UR QL STRIP: NEGATIVE
BUN SERPL-MCNC: 13 MG/DL (ref 6–20)
BUN/CREAT SERPL: 12.1 (ref 7–25)
BUPRENORPHINE SERPL-MCNC: NEGATIVE NG/ML
CALCIUM SPEC-SCNC: 9.3 MG/DL (ref 8.6–10.5)
CANNABINOIDS SERPL QL: POSITIVE
CHLORIDE SERPL-SCNC: 103 MMOL/L (ref 98–107)
CLARITY UR: CLEAR
CO2 SERPL-SCNC: 28.1 MMOL/L (ref 22–29)
COCAINE UR QL: NEGATIVE
COLOR UR: YELLOW
CREAT SERPL-MCNC: 1.07 MG/DL (ref 0.76–1.27)
D-LACTATE SERPL-SCNC: 0.5 MMOL/L (ref 0.3–2)
DEPRECATED RDW RBC AUTO: 40.7 FL (ref 37–54)
EGFRCR SERPLBLD CKD-EPI 2021: 88.3 ML/MIN/1.73
EOSINOPHIL # BLD AUTO: 0.08 10*3/MM3 (ref 0–0.4)
EOSINOPHIL NFR BLD AUTO: 1.2 % (ref 0.3–6.2)
ERYTHROCYTE [DISTWIDTH] IN BLOOD BY AUTOMATED COUNT: 11.2 % (ref 12.3–15.4)
ETHANOL UR QL: <0.01 %
GEN 5 1HR TROPONIN T REFLEX: <6 NG/L
GLOBULIN UR ELPH-MCNC: 2.1 GM/DL
GLUCOSE SERPL-MCNC: 97 MG/DL (ref 65–99)
GLUCOSE UR STRIP-MCNC: NEGATIVE MG/DL
HCT VFR BLD AUTO: 42 % (ref 37.5–51)
HGB BLD-MCNC: 14.1 G/DL (ref 13–17.7)
HGB UR QL STRIP.AUTO: NEGATIVE
HOLD SPECIMEN: NORMAL
IMM GRANULOCYTES # BLD AUTO: 0.01 10*3/MM3 (ref 0–0.05)
IMM GRANULOCYTES NFR BLD AUTO: 0.1 % (ref 0–0.5)
KETONES UR QL STRIP: NEGATIVE
LEUKOCYTE ESTERASE UR QL STRIP.AUTO: NEGATIVE
LIPASE SERPL-CCNC: 26 U/L (ref 13–60)
LYMPHOCYTES # BLD AUTO: 2 10*3/MM3 (ref 0.7–3.1)
LYMPHOCYTES NFR BLD AUTO: 29.7 % (ref 19.6–45.3)
MAGNESIUM SERPL-MCNC: 2.4 MG/DL (ref 1.6–2.6)
MCH RBC QN AUTO: 32.8 PG (ref 26.6–33)
MCHC RBC AUTO-ENTMCNC: 33.6 G/DL (ref 31.5–35.7)
MCV RBC AUTO: 97.7 FL (ref 79–97)
METHADONE UR QL SCN: NEGATIVE
MONOCYTES # BLD AUTO: 0.79 10*3/MM3 (ref 0.1–0.9)
MONOCYTES NFR BLD AUTO: 11.7 % (ref 5–12)
NEUTROPHILS NFR BLD AUTO: 3.82 10*3/MM3 (ref 1.7–7)
NEUTROPHILS NFR BLD AUTO: 56.9 % (ref 42.7–76)
NITRITE UR QL STRIP: NEGATIVE
NRBC BLD AUTO-RTO: 0 /100 WBC (ref 0–0.2)
NT-PROBNP SERPL-MCNC: <36 PG/ML (ref 0–450)
OPIATES UR QL: NEGATIVE
OXYCODONE UR QL SCN: POSITIVE
PCP UR QL SCN: NEGATIVE
PH UR STRIP.AUTO: 7 [PH] (ref 5–8)
PHOSPHATE SERPL-MCNC: 3.8 MG/DL (ref 2.5–4.5)
PLATELET # BLD AUTO: 191 10*3/MM3 (ref 140–450)
PMV BLD AUTO: 10.2 FL (ref 6–12)
POTASSIUM SERPL-SCNC: 3.6 MMOL/L (ref 3.5–5.2)
PROT SERPL-MCNC: 6.6 G/DL (ref 6–8.5)
PROT UR QL STRIP: NEGATIVE
RBC # BLD AUTO: 4.3 10*6/MM3 (ref 4.14–5.8)
SODIUM SERPL-SCNC: 140 MMOL/L (ref 136–145)
SP GR UR STRIP: 1.01 (ref 1–1.03)
TRICYCLICS UR QL SCN: NEGATIVE
TROPONIN T NUMERIC DELTA: NORMAL
TROPONIN T SERPL HS-MCNC: <6 NG/L
TSH SERPL DL<=0.05 MIU/L-ACNC: 1.16 UIU/ML (ref 0.27–4.2)
UROBILINOGEN UR QL STRIP: NORMAL
WBC NRBC COR # BLD AUTO: 6.73 10*3/MM3 (ref 3.4–10.8)
WHOLE BLOOD HOLD COAG: NORMAL

## 2025-05-17 PROCEDURE — G0378 HOSPITAL OBSERVATION PER HR: HCPCS

## 2025-05-17 PROCEDURE — 84100 ASSAY OF PHOSPHORUS: CPT | Performed by: OCCUPATIONAL THERAPIST

## 2025-05-17 PROCEDURE — 81003 URINALYSIS AUTO W/O SCOPE: CPT | Performed by: OCCUPATIONAL THERAPIST

## 2025-05-17 PROCEDURE — 83605 ASSAY OF LACTIC ACID: CPT

## 2025-05-17 PROCEDURE — 93005 ELECTROCARDIOGRAM TRACING: CPT | Performed by: OCCUPATIONAL THERAPIST

## 2025-05-17 PROCEDURE — 80050 GENERAL HEALTH PANEL: CPT | Performed by: OCCUPATIONAL THERAPIST

## 2025-05-17 PROCEDURE — 87040 BLOOD CULTURE FOR BACTERIA: CPT | Performed by: OCCUPATIONAL THERAPIST

## 2025-05-17 PROCEDURE — 83880 ASSAY OF NATRIURETIC PEPTIDE: CPT | Performed by: OCCUPATIONAL THERAPIST

## 2025-05-17 PROCEDURE — 82077 ASSAY SPEC XCP UR&BREATH IA: CPT | Performed by: OCCUPATIONAL THERAPIST

## 2025-05-17 PROCEDURE — 84484 ASSAY OF TROPONIN QUANT: CPT | Performed by: OCCUPATIONAL THERAPIST

## 2025-05-17 PROCEDURE — 83690 ASSAY OF LIPASE: CPT | Performed by: OCCUPATIONAL THERAPIST

## 2025-05-17 PROCEDURE — 80306 DRUG TEST PRSMV INSTRMNT: CPT | Performed by: OCCUPATIONAL THERAPIST

## 2025-05-17 PROCEDURE — 71045 X-RAY EXAM CHEST 1 VIEW: CPT

## 2025-05-17 PROCEDURE — 83735 ASSAY OF MAGNESIUM: CPT | Performed by: OCCUPATIONAL THERAPIST

## 2025-05-17 PROCEDURE — 99285 EMERGENCY DEPT VISIT HI MDM: CPT

## 2025-05-17 PROCEDURE — 36415 COLL VENOUS BLD VENIPUNCTURE: CPT

## 2025-05-17 PROCEDURE — 96374 THER/PROPH/DIAG INJ IV PUSH: CPT

## 2025-05-17 RX ORDER — ACETAMINOPHEN 325 MG/1
650 TABLET ORAL EVERY 4 HOURS PRN
Status: DISCONTINUED | OUTPATIENT
Start: 2025-05-17 | End: 2025-05-18 | Stop reason: HOSPADM

## 2025-05-17 RX ORDER — ONDANSETRON 2 MG/ML
4 INJECTION INTRAMUSCULAR; INTRAVENOUS EVERY 6 HOURS PRN
Status: DISCONTINUED | OUTPATIENT
Start: 2025-05-17 | End: 2025-05-18 | Stop reason: HOSPADM

## 2025-05-17 RX ORDER — NALOXONE HCL 0.4 MG/ML
0.4 VIAL (ML) INJECTION
Status: DISCONTINUED | OUTPATIENT
Start: 2025-05-17 | End: 2025-05-18 | Stop reason: HOSPADM

## 2025-05-17 RX ORDER — POLYETHYLENE GLYCOL 3350 17 G/17G
17 POWDER, FOR SOLUTION ORAL DAILY PRN
Status: DISCONTINUED | OUTPATIENT
Start: 2025-05-17 | End: 2025-05-18 | Stop reason: HOSPADM

## 2025-05-17 RX ORDER — LIDOCAINE HYDROCHLORIDE 20 MG/ML
10 SOLUTION OROPHARYNGEAL ONCE
Status: COMPLETED | OUTPATIENT
Start: 2025-05-17 | End: 2025-05-17

## 2025-05-17 RX ORDER — SUCRALFATE 1 G/1
1 TABLET ORAL 4 TIMES DAILY
COMMUNITY
Start: 2025-05-06

## 2025-05-17 RX ORDER — FAMOTIDINE 10 MG/ML
20 INJECTION, SOLUTION INTRAVENOUS ONCE
Status: DISCONTINUED | OUTPATIENT
Start: 2025-05-17 | End: 2025-05-18

## 2025-05-17 RX ORDER — BISACODYL 5 MG/1
5 TABLET, DELAYED RELEASE ORAL DAILY PRN
Status: DISCONTINUED | OUTPATIENT
Start: 2025-05-17 | End: 2025-05-18 | Stop reason: HOSPADM

## 2025-05-17 RX ORDER — AMOXICILLIN 250 MG
2 CAPSULE ORAL 2 TIMES DAILY PRN
Status: DISCONTINUED | OUTPATIENT
Start: 2025-05-17 | End: 2025-05-18 | Stop reason: HOSPADM

## 2025-05-17 RX ORDER — PANTOPRAZOLE SODIUM 40 MG/10ML
80 INJECTION, POWDER, LYOPHILIZED, FOR SOLUTION INTRAVENOUS ONCE
Status: COMPLETED | OUTPATIENT
Start: 2025-05-17 | End: 2025-05-17

## 2025-05-17 RX ORDER — MORPHINE SULFATE 2 MG/ML
1 INJECTION, SOLUTION INTRAMUSCULAR; INTRAVENOUS EVERY 4 HOURS PRN
Status: DISCONTINUED | OUTPATIENT
Start: 2025-05-17 | End: 2025-05-18 | Stop reason: HOSPADM

## 2025-05-17 RX ORDER — ACETAMINOPHEN 160 MG/5ML
650 SOLUTION ORAL EVERY 4 HOURS PRN
Status: DISCONTINUED | OUTPATIENT
Start: 2025-05-17 | End: 2025-05-18 | Stop reason: HOSPADM

## 2025-05-17 RX ORDER — ALUMINA, MAGNESIA, AND SIMETHICONE 2400; 2400; 240 MG/30ML; MG/30ML; MG/30ML
15 SUSPENSION ORAL EVERY 6 HOURS PRN
Status: DISCONTINUED | OUTPATIENT
Start: 2025-05-17 | End: 2025-05-18 | Stop reason: HOSPADM

## 2025-05-17 RX ORDER — SODIUM CHLORIDE 0.9 % (FLUSH) 0.9 %
10 SYRINGE (ML) INJECTION AS NEEDED
Status: DISCONTINUED | OUTPATIENT
Start: 2025-05-17 | End: 2025-05-18 | Stop reason: HOSPADM

## 2025-05-17 RX ORDER — SODIUM CHLORIDE 9 MG/ML
40 INJECTION, SOLUTION INTRAVENOUS AS NEEDED
Status: DISCONTINUED | OUTPATIENT
Start: 2025-05-17 | End: 2025-05-18 | Stop reason: HOSPADM

## 2025-05-17 RX ORDER — MORPHINE SULFATE 2 MG/ML
2 INJECTION, SOLUTION INTRAMUSCULAR; INTRAVENOUS EVERY 4 HOURS PRN
Status: DISCONTINUED | OUTPATIENT
Start: 2025-05-17 | End: 2025-05-18 | Stop reason: HOSPADM

## 2025-05-17 RX ORDER — PANTOPRAZOLE SODIUM 40 MG/1
40 TABLET, DELAYED RELEASE ORAL 2 TIMES DAILY
COMMUNITY
End: 2025-05-22

## 2025-05-17 RX ORDER — NITROGLYCERIN 0.4 MG/1
0.4 TABLET SUBLINGUAL
Status: DISCONTINUED | OUTPATIENT
Start: 2025-05-17 | End: 2025-05-18 | Stop reason: HOSPADM

## 2025-05-17 RX ORDER — BISACODYL 10 MG
10 SUPPOSITORY, RECTAL RECTAL DAILY PRN
Status: DISCONTINUED | OUTPATIENT
Start: 2025-05-17 | End: 2025-05-18 | Stop reason: HOSPADM

## 2025-05-17 RX ORDER — ONDANSETRON 4 MG/1
4 TABLET, ORALLY DISINTEGRATING ORAL EVERY 6 HOURS PRN
Status: DISCONTINUED | OUTPATIENT
Start: 2025-05-17 | End: 2025-05-18 | Stop reason: HOSPADM

## 2025-05-17 RX ORDER — CALCIUM CARBONATE 500 MG/1
2 TABLET, CHEWABLE ORAL 2 TIMES DAILY PRN
Status: DISCONTINUED | OUTPATIENT
Start: 2025-05-17 | End: 2025-05-18 | Stop reason: HOSPADM

## 2025-05-17 RX ORDER — ACETAMINOPHEN 650 MG/1
650 SUPPOSITORY RECTAL EVERY 4 HOURS PRN
Status: DISCONTINUED | OUTPATIENT
Start: 2025-05-17 | End: 2025-05-18 | Stop reason: HOSPADM

## 2025-05-17 RX ORDER — SODIUM CHLORIDE 0.9 % (FLUSH) 0.9 %
10 SYRINGE (ML) INJECTION EVERY 12 HOURS SCHEDULED
Status: DISCONTINUED | OUTPATIENT
Start: 2025-05-17 | End: 2025-05-18 | Stop reason: HOSPADM

## 2025-05-17 RX ADMIN — LIDOCAINE HYDROCHLORIDE 10 ML: 20 SOLUTION ORAL at 18:13

## 2025-05-17 RX ADMIN — ALUMINUM HYDROXIDE, MAGNESIUM HYDROXIDE, AND DIMETHICONE 15 ML: 400; 400; 40 SUSPENSION ORAL at 18:13

## 2025-05-17 RX ADMIN — PANTOPRAZOLE SODIUM 80 MG: 40 INJECTION, POWDER, FOR SOLUTION INTRAVENOUS at 19:32

## 2025-05-17 NOTE — ED PROVIDER NOTES
Subjective   History of Present Illness  Patient is a 45-year-old male with past medical history significant for GERD with prior GI bleed presenting to the ED for evaluation of upper abdominal pain x 2 weeks.  Patient reports he is having increased and reflux symptoms and has been coughing up material that looks like coffee grounds.  Patient reports that he has been sticking to a bland diet and only drinking water but it is getting worse rather than better.  Patient states that he saw [facility] in the past, and he was instructed to come to the ED when he contacted them.  Patient reports that in addition to this he feels like his blood pressure is spiking.  Patient has had nausea and dry heaving.  Patient has tried over-the-counter agents without improvement.  He takes pantoprazole and sucralfate at home.  He was admitted to the hospital about a year ago for the same symptoms.  Patient denies changes in bowel or bladder function, diabetes, hematochezia, melena, recent surgery, and changes in medications.  No increased peripheral edema, headache, or vision changes.  He does not know how high his blood pressure has been, but he states that when he took it before he came here the top number was over 160.        Review of Systems   Constitutional:  Negative for activity change, chills and fever.   Respiratory:  Positive for cough. Negative for shortness of breath.        Past Medical History:   Diagnosis Date    Anxiety     GERD (gastroesophageal reflux disease)        Allergies   Allergen Reactions    Prednisone GI Bleeding     PO prednisone causes GI bleeding per patient       Past Surgical History:   Procedure Laterality Date    ENDOSCOPY N/A 8/12/2024    Procedure: ESOPHAGOGASTRODUODENOSCOPY;  Surgeon: Oseas Pfeiffer MD;  Location: [facility] ENDOSCOPY;  Service: Gastroenterology;  Laterality: N/A;  POST-EROSIVE ESOPHAGITIS       No family history on file.    Social History     Socioeconomic History    Marital status:     Tobacco Use    Smoking status: Former     Types: Cigars    Smokeless tobacco: Former     Types: Chew   Vaping Use    Vaping status: Never Used   Substance and Sexual Activity    Alcohol use: Not Currently    Drug use: Not Currently     Types: Marijuana     Comment: occ    Sexual activity: Defer           Objective   Physical Exam  Vitals and nursing note reviewed.   Constitutional:       General: He is not in acute distress.     Appearance: He is well-developed. He is not ill-appearing, toxic-appearing or diaphoretic.   HENT:      Head: Normocephalic and atraumatic.      Mouth/Throat:      Mouth: Mucous membranes are moist.   Eyes:      General: No scleral icterus.     Extraocular Movements: Extraocular movements intact.      Pupils: Pupils are equal, round, and reactive to light.   Cardiovascular:      Rate and Rhythm: Normal rate and regular rhythm.      Heart sounds: Normal heart sounds. No murmur heard.     No friction rub. No gallop.   Pulmonary:      Effort: Pulmonary effort is normal. No respiratory distress.      Breath sounds: Normal breath sounds. No stridor. No wheezing, rhonchi or rales.   Abdominal:      General: Abdomen is protuberant. Bowel sounds are normal.      Palpations: Abdomen is soft. There is no hepatomegaly, splenomegaly, mass or pulsatile mass.      Tenderness: There is abdominal tenderness in the epigastric area. Negative signs include Smith's sign and McBurney's sign.   Skin:     General: Skin is warm and dry.      Capillary Refill: Capillary refill takes 2 to 3 seconds.      Coloration: Skin is not jaundiced, mottled or pale.      Findings: No erythema or rash.   Neurological:      General: No focal deficit present.      Mental Status: He is alert.   Psychiatric:         Mood and Affect: Mood normal.         Behavior: Behavior normal.         Procedures           ED Course        Labs Reviewed   COMPREHENSIVE METABOLIC PANEL - Abnormal; Notable for the following components:        Result Value    Total Bilirubin 1.6 (*)     All other components within normal limits    Narrative:     GFR Categories in Chronic Kidney Disease (CKD)              GFR Category          GFR (mL/min/1.73)    Interpretation  G1                    90 or greater        Normal or high (1)  G2                    60-89                Mild decrease (1)  G3a                   45-59                Mild to moderate decrease  G3b                   30-44                Moderate to severe decrease  G4                    15-29                Severe decrease  G5                    14 or less           Kidney failure    (1)In the absence of evidence of kidney disease, neither GFR category G1 or G2 fulfill the criteria for CKD.    eGFR calculation 2021 CKD-EPI creatinine equation, which does not include race as a factor   URINE DRUG SCREEN - Abnormal; Notable for the following components:    THC, Screen, Urine Positive (*)     Oxycodone Screen, Urine Positive (*)     All other components within normal limits    Narrative:     Cutoff For Drugs Screened:    Amphetamines               500 ng/ml  Barbiturates               200 ng/ml  Benzodiazepines            150 ng/ml  Cocaine                    150 ng/ml  Methadone                  200 ng/ml  Opiates                    100 ng/ml  Phencyclidine               25 ng/ml  THC                         50 ng/ml  Methamphetamine            500 ng/ml  Tricyclic Antidepressants  300 ng/ml  Oxycodone                  100 ng/ml  Buprenorphine               10 ng/ml    The normal value for all drugs tested is negative. This report includes unconfirmed screening results, with the cutoff values listed, to be used for medical treatment purposes only.  Unconfirmed results must not be used for non-medical purposes such as employment or legal testing.  Clinical consideration should be applied to any drug of abuse test, particularly when unconfirmed results are used.    All urine drugs of abuse  requests without chain of custody are for medical screening purposes only.  False positives are possible.     CBC WITH AUTO DIFFERENTIAL - Abnormal; Notable for the following components:    MCV 97.7 (*)     RDW 11.2 (*)     All other components within normal limits   LIPASE - Normal   URINALYSIS W/ CULTURE IF INDICATED - Normal    Narrative:     In absence of clinical symptoms, the presence of pyuria, bacteria, and/or nitrites on the urinalysis result does not correlate with infection.  Urine microscopic not indicated.   BNP (IN-HOUSE) - Normal    Narrative:     This assay is used as an aid in the diagnosis of individuals suspected of having heart failure. It can be used as an aid in the diagnosis of acute decompensated heart failure (ADHF) in patients presenting with signs and symptoms of ADHF to the emergency department (ED). In addition, NT-proBNP of <300 pg/mL indicates ADHF is not likely.    Age Range Result Interpretation  NT-proBNP Concentration (pg/mL:      <50             Positive            >450                   Gray                 300-450                    Negative             <300    50-75           Positive            >900                  Gray                300-900                  Negative            <300      >75             Positive            >1800                  Gray                300-1800                  Negative            <300   TROPONIN - Normal    Narrative:     High Sensitive Troponin T Reference Range:  <14.0 ng/L- Negative Female for AMI  <22.0 ng/L- Negative Male for AMI  >=14 - Abnormal Female indicating possible myocardial injury.  >=22 - Abnormal Male indicating possible myocardial injury.   Clinicians would have to utilize clinical acumen, EKG, Troponin, and serial changes to determine if it is an Acute Myocardial Infarction or myocardial injury due to an underlying chronic condition.        MAGNESIUM - Normal   PHOSPHORUS - Normal   TSH RFX ON ABNORMAL TO FREE T4 - Normal    POC LACTATE - Normal   BLOOD CULTURE   BLOOD CULTURE   ETHANOL    Narrative:     Plasma Ethanol Clinical Symptoms:    ETOH (%)               Clinical Symptom  .01-.05              No apparent influence  .03-.12              Euphoria, Diminished judgment and attention   .09-.25              Impaired comprehension, Muscle incoordination  .18-.30              Confusion, Staggered gait, Slurred speech  .25-.40              Markedly decreased response to stimuli, unable to stand or                        walk, vomitting, sleep or stupor  .35-.50              Comatose, Anesthesia, Subnormal body temperature       HIGH SENSITIVITIY TROPONIN T 1HR    Narrative:     High Sensitive Troponin T Reference Range:  <14.0 ng/L- Negative Female for AMI  <22.0 ng/L- Negative Male for AMI  >=14 - Abnormal Female indicating possible myocardial injury.  >=22 - Abnormal Male indicating possible myocardial injury.   Clinicians would have to utilize clinical acumen, EKG, Troponin, and serial changes to determine if it is an Acute Myocardial Infarction or myocardial injury due to an underlying chronic condition.        BASIC METABOLIC PANEL   CBC WITH AUTO DIFFERENTIAL   CBC AND DIFFERENTIAL    Narrative:     The following orders were created for panel order CBC & Differential.  Procedure                               Abnormality         Status                     ---------                               -----------         ------                     CBC Auto Differential[[id number]]        Abnormal            Final result                 Please view results for these tests on the individual orders.   EXTRA TUBES    Narrative:     The following orders were created for panel order Extra Tubes.  Procedure                               Abnormality         Status                     ---------                               -----------         ------                     Gold Top - [facility][[id number]]                                   Final result                Light Blue Top[[id number]]                                   Final result                 Please view results for these tests on the individual orders.   GOLD TOP - SST   LIGHT BLUE TOP     XR Chest 1 View  Result Date: 5/17/2025  Impression: No active cardiopulmonary disease Electronically Signed: Florin Lopez DO  5/17/2025 5:56 PM EDT  Workstation ID: [id number]    Medications   aluminum-magnesium hydroxide-simethicone (MAALOX MAX) 400-400-40 MG/5ML suspension 15 mL (15 mL Oral Given 5/17/25 1813)   sodium chloride 0.9 % flush 10 mL (has no administration in time range)   sodium chloride 0.9 % flush 10 mL (has no administration in time range)   sodium chloride 0.9 % infusion 40 mL (has no administration in time range)   nitroglycerin (NITROSTAT) SL tablet 0.4 mg (has no administration in time range)   Potassium Replacement - Follow Nurse / BPA Driven Protocol (has no administration in time range)   Magnesium Standard Dose Replacement - Follow Nurse / BPA Driven Protocol (has no administration in time range)   Phosphorus Replacement - Follow Nurse / BPA Driven Protocol (has no administration in time range)   Calcium Replacement - Follow Nurse / BPA Driven Protocol (has no administration in time range)   acetaminophen (TYLENOL) tablet 650 mg (has no administration in time range)     Or   acetaminophen (TYLENOL) 160 MG/5ML oral solution 650 mg (has no administration in time range)     Or   acetaminophen (TYLENOL) suppository 650 mg (has no administration in time range)   tiZANidine (ZANAFLEX) tablet 4 mg (has no administration in time range)   morphine injection 1 mg (has no administration in time range)     And   naloxone (NARCAN) injection 0.4 mg (has no administration in time range)   morphine injection 2 mg (has no administration in time range)     And   naloxone (NARCAN) injection 0.4 mg (has no administration in time range)   melatonin tablet 5 mg (has no administration in time range)    sennosides-docusate (PERICOLACE) 8.6-50 MG per tablet 2 tablet (has no administration in time range)     And   polyethylene glycol (MIRALAX) packet 17 g (has no administration in time range)     And   bisacodyl (DULCOLAX) EC tablet 5 mg (has no administration in time range)     And   bisacodyl (DULCOLAX) suppository 10 mg (has no administration in time range)   ondansetron ODT (ZOFRAN-ODT) disintegrating tablet 4 mg (has no administration in time range)     Or   ondansetron (ZOFRAN) injection 4 mg (has no administration in time range)   calcium carbonate (TUMS) chewable tablet 500 mg (200 mg elemental) (has no administration in time range)   famotidine (PEPCID) injection 20 mg (has no administration in time range)   Lidocaine Viscous HCl (XYLOCAINE) 2 % solution 10 mL (10 mL Mouth/Throat Given 5/17/25 1813)   pantoprazole (PROTONIX) injection 80 mg (80 mg Intravenous Given 5/17/25 1932)               HEART Score: 2                                      Medical Decision Making  Patient is a 45-year-old male who presented with the above complaint.  He had the above evaluation and exam.  Patient was placed on the monitor and IV was established.    EKGs independently interpreted by Dr. Aldridge and reviewed by myself.  Initial EKG upon arrival revealed sinus rhythm with borderline prolonged PA interval.  There was no prior for comparison.  EKG was repeated later in visit due to increase in symptoms.  At that time, patient was noted to have sinus bradycardia with first-degree AV block.    Imaging independently interpreted by radiology and reviewed by myself.  Chest x-ray was negative for acute change.    Labs: Initial troponin less than 6 with a troponin delta could not be calculated.  Blood cultures pending.  TSH, electrolytes, BNP within normal limits.  Lipase 26.  CMP unremarkable.  CBC with white count of 6.73 and 14.1 hemoglobin.  No left shift.  POC lactate 0.5.    Patient given GI cocktail and Protonix in the ED.   At reassessment, patient resting comfortably in no acute distress.  Patient started having increased epigastric pain after eating, so Pepcid was ordered in addition to second EKG.  Patient is hemodynamically stable and afebrile.  No peritonitis on exam.  Patient has reportedly been coughing up dark material that looks like coffee grounds.  He does have a history of upper GI bleed.  Patient admitted to recent NSAID use.  He is not experiencing any systemic symptoms at this time such as fever or generalized abdominal pain.  Pain and tenderness are limited to the epigastric region.  He did have first-degree block on EKG, so consult was requested with cardiology in addition to GI.  Patient would likely benefit from EGD.  He will be placed in observation for morning specialty consultations and cardiac monitoring.  Options were discussed with patient, who agrees with this plan.    Problems Addressed:  Epigastric pain: acute illness or injury  Nausea: acute illness or injury  Sinus bradycardia: complicated acute illness or injury    Amount and/or Complexity of Data Reviewed  Labs: ordered.  Radiology: ordered.  ECG/medicine tests: ordered.    Risk  OTC drugs.  Prescription drug management.  Decision regarding hospitalization.        Final diagnoses:   Epigastric pain   Nausea   Sinus bradycardia       ED Disposition  ED Disposition       ED Disposition   Decision to Admit    Condition   --    Comment   --               No follow-up provider specified.       Medication List        ASK your doctor about these medications      pantoprazole 40 MG EC tablet  Commonly known as: PROTONIX  Ask about: Which instructions should I use?                 Anisha Panda PA-C  05/17/25 0466

## 2025-05-18 ENCOUNTER — ON CAMPUS - OUTPATIENT (AMBULATORY)
Dept: URBAN - METROPOLITAN AREA HOSPITAL 85 | Facility: HOSPITAL | Age: 45
End: 2025-05-18
Payer: COMMERCIAL

## 2025-05-18 VITALS
BODY MASS INDEX: 35.22 KG/M2 | SYSTOLIC BLOOD PRESSURE: 129 MMHG | DIASTOLIC BLOOD PRESSURE: 73 MMHG | OXYGEN SATURATION: 93 % | TEMPERATURE: 97.8 F | WEIGHT: 246 LBS | HEART RATE: 66 BPM | RESPIRATION RATE: 14 BRPM | HEIGHT: 70 IN

## 2025-05-18 DIAGNOSIS — R10.13 EPIGASTRIC PAIN: ICD-10-CM

## 2025-05-18 DIAGNOSIS — K21.9 GASTRO-ESOPHAGEAL REFLUX DISEASE WITHOUT ESOPHAGITIS: ICD-10-CM

## 2025-05-18 DIAGNOSIS — R17 UNSPECIFIED JAUNDICE: ICD-10-CM

## 2025-05-18 LAB
ANION GAP SERPL CALCULATED.3IONS-SCNC: 11.8 MMOL/L (ref 5–15)
BASOPHILS # BLD AUTO: 0.03 10*3/MM3 (ref 0–0.2)
BASOPHILS NFR BLD AUTO: 0.4 % (ref 0–1.5)
BUN SERPL-MCNC: 13 MG/DL (ref 6–20)
BUN/CREAT SERPL: 13 (ref 7–25)
CALCIUM SPEC-SCNC: 9.4 MG/DL (ref 8.6–10.5)
CHLORIDE SERPL-SCNC: 107 MMOL/L (ref 98–107)
CO2 SERPL-SCNC: 24.2 MMOL/L (ref 22–29)
CREAT SERPL-MCNC: 1 MG/DL (ref 0.76–1.27)
DEPRECATED RDW RBC AUTO: 40.3 FL (ref 37–54)
EGFRCR SERPLBLD CKD-EPI 2021: 95.8 ML/MIN/1.73
EOSINOPHIL # BLD AUTO: 0.04 10*3/MM3 (ref 0–0.4)
EOSINOPHIL NFR BLD AUTO: 0.5 % (ref 0.3–6.2)
ERYTHROCYTE [DISTWIDTH] IN BLOOD BY AUTOMATED COUNT: 11.2 % (ref 12.3–15.4)
GLUCOSE SERPL-MCNC: 107 MG/DL (ref 65–99)
HCT VFR BLD AUTO: 44.4 % (ref 37.5–51)
HGB BLD-MCNC: 15.1 G/DL (ref 13–17.7)
IMM GRANULOCYTES # BLD AUTO: 0.02 10*3/MM3 (ref 0–0.05)
IMM GRANULOCYTES NFR BLD AUTO: 0.2 % (ref 0–0.5)
LYMPHOCYTES # BLD AUTO: 1.12 10*3/MM3 (ref 0.7–3.1)
LYMPHOCYTES NFR BLD AUTO: 14 % (ref 19.6–45.3)
MCH RBC QN AUTO: 33.1 PG (ref 26.6–33)
MCHC RBC AUTO-ENTMCNC: 34 G/DL (ref 31.5–35.7)
MCV RBC AUTO: 97.4 FL (ref 79–97)
MONOCYTES # BLD AUTO: 0.55 10*3/MM3 (ref 0.1–0.9)
MONOCYTES NFR BLD AUTO: 6.9 % (ref 5–12)
NEUTROPHILS NFR BLD AUTO: 6.25 10*3/MM3 (ref 1.7–7)
NEUTROPHILS NFR BLD AUTO: 78 % (ref 42.7–76)
NRBC BLD AUTO-RTO: 0 /100 WBC (ref 0–0.2)
PLATELET # BLD AUTO: 179 10*3/MM3 (ref 140–450)
PMV BLD AUTO: 9.9 FL (ref 6–12)
POTASSIUM SERPL-SCNC: 4.1 MMOL/L (ref 3.5–5.2)
RBC # BLD AUTO: 4.56 10*6/MM3 (ref 4.14–5.8)
SODIUM SERPL-SCNC: 143 MMOL/L (ref 136–145)
WBC NRBC COR # BLD AUTO: 8.01 10*3/MM3 (ref 3.4–10.8)

## 2025-05-18 PROCEDURE — 25010000002 ONDANSETRON PER 1 MG: Performed by: OCCUPATIONAL THERAPIST

## 2025-05-18 PROCEDURE — 80048 BASIC METABOLIC PNL TOTAL CA: CPT | Performed by: OCCUPATIONAL THERAPIST

## 2025-05-18 PROCEDURE — 99223 1ST HOSP IP/OBS HIGH 75: CPT | Performed by: NURSE PRACTITIONER

## 2025-05-18 PROCEDURE — 85025 COMPLETE CBC W/AUTO DIFF WBC: CPT | Performed by: OCCUPATIONAL THERAPIST

## 2025-05-18 PROCEDURE — 96375 TX/PRO/DX INJ NEW DRUG ADDON: CPT

## 2025-05-18 PROCEDURE — 96376 TX/PRO/DX INJ SAME DRUG ADON: CPT

## 2025-05-18 PROCEDURE — G0378 HOSPITAL OBSERVATION PER HR: HCPCS

## 2025-05-18 PROCEDURE — 25010000002 MORPHINE PER 10 MG: Performed by: OCCUPATIONAL THERAPIST

## 2025-05-18 RX ORDER — SUCRALFATE 1 G/1
1 TABLET ORAL 4 TIMES DAILY
Status: DISCONTINUED | OUTPATIENT
Start: 2025-05-18 | End: 2025-05-18 | Stop reason: HOSPADM

## 2025-05-18 RX ORDER — LIDOCAINE HYDROCHLORIDE 20 MG/ML
10 SOLUTION OROPHARYNGEAL ONCE
Status: COMPLETED | OUTPATIENT
Start: 2025-05-18 | End: 2025-05-18

## 2025-05-18 RX ORDER — PANTOPRAZOLE SODIUM 40 MG/10ML
40 INJECTION, POWDER, LYOPHILIZED, FOR SOLUTION INTRAVENOUS
Status: DISCONTINUED | OUTPATIENT
Start: 2025-05-18 | End: 2025-05-18 | Stop reason: HOSPADM

## 2025-05-18 RX ORDER — ALUMINA, MAGNESIA, AND SIMETHICONE 2400; 2400; 240 MG/30ML; MG/30ML; MG/30ML
15 SUSPENSION ORAL EVERY 6 HOURS PRN
Status: DISCONTINUED | OUTPATIENT
Start: 2025-05-18 | End: 2025-05-18

## 2025-05-18 RX ORDER — FAMOTIDINE 20 MG/1
40 TABLET, FILM COATED ORAL NIGHTLY
Status: DISCONTINUED | OUTPATIENT
Start: 2025-05-18 | End: 2025-05-18 | Stop reason: HOSPADM

## 2025-05-18 RX ORDER — PANTOPRAZOLE SODIUM 40 MG/10ML
40 INJECTION, POWDER, LYOPHILIZED, FOR SOLUTION INTRAVENOUS
Status: DISCONTINUED | OUTPATIENT
Start: 2025-05-18 | End: 2025-05-18

## 2025-05-18 RX ORDER — ALUMINA, MAGNESIA, AND SIMETHICONE 2400; 2400; 240 MG/30ML; MG/30ML; MG/30ML
15 SUSPENSION ORAL ONCE
Status: COMPLETED | OUTPATIENT
Start: 2025-05-18 | End: 2025-05-18

## 2025-05-18 RX ORDER — ONDANSETRON 4 MG/1
4 TABLET, ORALLY DISINTEGRATING ORAL EVERY 8 HOURS PRN
Qty: 15 TABLET | Refills: 0 | Status: SHIPPED | OUTPATIENT
Start: 2025-05-18

## 2025-05-18 RX ADMIN — LIDOCAINE HYDROCHLORIDE 10 ML: 20 SOLUTION ORAL at 10:58

## 2025-05-18 RX ADMIN — MORPHINE SULFATE 1 MG: 2 INJECTION, SOLUTION INTRAMUSCULAR; INTRAVENOUS at 14:14

## 2025-05-18 RX ADMIN — ALUMINUM HYDROXIDE, MAGNESIUM HYDROXIDE, AND DIMETHICONE 15 ML: 400; 400; 40 SUSPENSION ORAL at 10:58

## 2025-05-18 RX ADMIN — SUCRALFATE 1 G: 1 TABLET ORAL at 11:40

## 2025-05-18 RX ADMIN — ONDANSETRON 4 MG: 2 INJECTION, SOLUTION INTRAMUSCULAR; INTRAVENOUS at 14:15

## 2025-05-18 RX ADMIN — PANTOPRAZOLE SODIUM 40 MG: 40 INJECTION, POWDER, FOR SOLUTION INTRAVENOUS at 07:33

## 2025-05-18 RX ADMIN — MORPHINE SULFATE 1 MG: 2 INJECTION, SOLUTION INTRAMUSCULAR; INTRAVENOUS at 09:07

## 2025-05-18 NOTE — CONSULTS
GI CONSULT  NOTE:    Referring Provider:     SCOTT Panda     Chief complaint:    Epigastric pain     Subjective   Upper abdominal pain x 2 weeks      History of present illness:     Patient is a 46 y/o male with a history of GERD & anxiety who presented to 17 2025 with complaint of upper abdominal pain for 2 weeks.  Patient ended up being admitted for epigastric pain nausea and sinus bradycardia.  GI was consulted for epigastric pain.    Patient states his original symptoms started 5 years ago.  States this flare started 1 month ago.  He normally takes pantoprazole 40 mg once a day but when he flares he increases it to twice a day and start Carafate.  Patient states he takes Carafate sometimes as a liquid sometimes as a pill.  Patient is also tried Gaviscon and a bland diet in the last month.  Patient states he noticed coffee-ground emesis 1 month ago and his last episode was today.  He denies any bright red blood in his stool or emesis.  Patient has daily bowel movements.  States his reflux is not controlled on daily pantoprazole.  States he did discuss a different medication with Dr. Pfeiffer last year when he had a EGD.  Occasional globus sensation after eating.  Denies any pill dysphagia.  Occasionally takes ibuprofen.    Patient states he was a heavy alcohol user 13 years ago.  Denies ever having a fatty liver, Cirrhosis or elevated LFTs.    LABS: Sodium and potassium are normal.  Creatinine 1.  LFTs are normal except for total bilirubin 1.6.  Lipase 26.  WBCs 8.01, hemoglobin 15.1 and platelets 179.  No abdominal imaging    Endo History:  8/2024 colonoscopy (Dr. Pfeiffer) -grade 2 internal hemorrhoids, random colon biopsies negative  8/2024 EGD (Dr. Pfeiffer) -LA grade B erosive esophagitis  1/2021 EGD/colonoscopy (Dr. Arevalo) - LA grade B erosive esophagitis, gastritis (biopsy shows chemical gastritis), duodenal biopsy is benign, grade 1 internal hemorrhoids, random colon biopsies negative     Past Medical  History:  Past Medical History:   Diagnosis Date    Anxiety     GERD (gastroesophageal reflux disease)        Past Surgical History:  Past Surgical History:   Procedure Laterality Date    ENDOSCOPY N/A 8/12/2024    Procedure: ESOPHAGOGASTRODUODENOSCOPY;  Surgeon: Oseas Pfeiffer MD;  Location: [facility] ENDOSCOPY;  Service: Gastroenterology;  Laterality: N/A;  POST-EROSIVE ESOPHAGITIS       Social History:  Social History     Tobacco Use    Smoking status: Former     Types: Cigars    Smokeless tobacco: Former     Types: Chew   Vaping Use    Vaping status: Never Used   Substance Use Topics    Alcohol use: Not Currently    Drug use: Not Currently     Types: Marijuana     Comment: occ       Family History:  History reviewed. No pertinent family history.    Medications:  Medications Prior to Admission   Medication Sig Dispense Refill Last Dose/Taking    pantoprazole (PROTONIX) 40 MG EC tablet Take 1 tablet by mouth 2 (Two) Times a Day.   5/17/2025 Morning    sucralfate (CARAFATE) 1 g tablet Take 1 tablet by mouth 4 (Four) Times a Day.   5/17/2025 at  1:00 PM       Scheduled Meds:pantoprazole, 40 mg, Intravenous, Q AM  sodium chloride, 10 mL, Intravenous, Q12H  [Held by provider] sucralfate, 1 g, Oral, 4x Daily      Continuous Infusions:   PRN Meds:.  acetaminophen **OR** acetaminophen **OR** acetaminophen    aluminum-magnesium hydroxide-simethicone    senna-docusate sodium **AND** polyethylene glycol **AND** bisacodyl **AND** bisacodyl    calcium carbonate    Calcium Replacement - Follow Nurse / BPA Driven Protocol    Magnesium Standard Dose Replacement - Follow Nurse / BPA Driven Protocol    melatonin    Morphine **AND** naloxone    Morphine **AND** naloxone    nitroglycerin    ondansetron ODT **OR** ondansetron    Phosphorus Replacement - Follow Nurse / BPA Driven Protocol    Potassium Replacement - Follow Nurse / BPA Driven Protocol    sodium chloride    sodium chloride     tiZANidine    ALLERGIES:  Prednisone    ROS:  Review of Systems   Constitutional:  Positive for chills. Negative for fever and unexpected weight change.   HENT:  Positive for trouble swallowing.    Gastrointestinal:  Positive for abdominal pain, nausea and vomiting. Negative for abdominal distention, anal bleeding, blood in stool, constipation, diarrhea and rectal pain.       The following systems were reviewed and negative;    Constitution:  No fevers, chills, no unintentional weight loss  Skin: no rash, no jaundice  Eyes:  No blurry vision, no eye pain  HENT:  No change in hearing or smell  Resp:  No dyspnea or cough  CV:  No chest pain or palpitations  :  No dysuria, hematuria  Musculoskeletal:  No leg cramps or arthralgias  Neuro:  No tremor, no numbness  Psych:  No depression or confusion    Objective  Rm 220.  Resting in the hospital bed.  No family present.    Vital Signs:   Vitals:    05/17/25 1913 05/17/25 2109 05/18/25 0000 05/18/25 0350   BP:  111/82 107/66 124/66   BP Location:  Right arm Right arm Right arm   Patient Position:  Lying Lying Lying   Pulse:       Resp:  12 12 18   Temp:  97.7 °F (36.5 °C) 97.6 °F (36.4 °C) 97.8 °F (36.6 °C)   TempSrc:  Oral Oral Oral   SpO2:       Weight: 112 kg (246 lb)      Height:           Physical Exam:    General Appearance:    Awake and alert, in no acute distress   Head:    Normocephalic, without obvious abnormality, atraumatic   Eyes:            Conjunctivae normal, anicteric sclerae, pupils equal   Ears:    Ears appear intact with no abnormalities noted   Throat:   No oral lesions, no thrush, oral mucosa moist   Neck:   Supple, no JVD   Lungs:     respirations regular, even and unlabored        Chest Wall:    No abnormalities observed   Abdomen:      soft, nontender, no rebound or guarding, nondistended, no hepatosplenomegaly   Rectal:     Deferred   Extremities:   Moves all extremities, no edema, no cyanosis   Pulses:   Pulses palpable and equal bilaterally  "  Skin:   No rash, no jaundice, normal palpation        Neurologic:   Cranial nerves 2 - 12 grossly intact, no asterixis       Results Review:   I reviewed the patient's labs and imaging.  CBC    Results from last 7 days   Lab Units 05/18/25  0633 05/17/25  1704   WBC 10*3/mm3 8.01 6.73   HEMOGLOBIN g/dL 15.1 14.1   PLATELETS 10*3/mm3 179 191     CMP   Results from last 7 days   Lab Units 05/18/25  0633 05/17/25  1704   SODIUM mmol/L 143 140   POTASSIUM mmol/L 4.1 3.6   CHLORIDE mmol/L 107 103   CO2 mmol/L 24.2 28.1   BUN mg/dL 13 13   CREATININE mg/dL 1.00 1.07   GLUCOSE mg/dL 107* 97   ALBUMIN g/dL  --  4.5   BILIRUBIN mg/dL  --  1.6*   ALK PHOS U/L  --  45   AST (SGOT) U/L  --  18   ALT (SGPT) U/L  --  19   MAGNESIUM mg/dL  --  2.4   PHOSPHORUS mg/dL  --  3.8   LIPASE U/L  --  26     Cr Clearance Estimated Creatinine Clearance: 119.4 mL/min (by C-G formula based on SCr of 1 mg/dL).  Coag     HbA1C No results found for: \"HGBA1C\"  Blood Glucose No results found for: \"POCGLU\"  Infection     UA    Results from last 7 days   Lab Units 05/17/25  1723   NITRITE UA  Negative     Radiology(recent) XR Chest 1 View  Result Date: 5/17/2025  Impression: No active cardiopulmonary disease Electronically Signed: Florin Lopez DO  5/17/2025 5:56 PM EDT  Workstation ID: [id number]        ASSESSMENT:  Epigastric and substernal pain consider uncontrolled reflux  Isolated elevation of bilirubin   GERD  Anxiety       PLAN:  Patient is a 45-year-old male who presented to the ER with upper abdominal pain for 2 weeks.  Patient states he has been having a flare for the past month.  States he has had issues with this pain for 5 years.      Carafate slurry for 4 weeks.  Will send in Voquezna As pantoprazole is not controlling his reflux. I have discussed with him. I have sent to [facility] to get filled.   Will have to use Protonix twice a day here.  No plans for repeat endoscopy as hemoglobin is 15.1 and has a known history of erosive " esophagitis.  Avoid NSAIDs.  Okay for diet.  Will send message to office to get him scheduled for hospital follow-up.    I discussed the patient's findings and my recommendations with the patient.  Amaris Bryant, SHAUNA  05/18/25  08:21 EDT    Time:

## 2025-05-18 NOTE — DISCHARGE SUMMARY
[facility]    Luan Gomes MD    CHIEF COMPLAINT:     Abdominal and chest pain    HISTORY OF PRESENT ILLNESS:    Obtained from ED provider HPI on 9/17/2025:  Patient is a 45-year-old male with past medical history significant for GERD with prior GI bleed presenting to the ED for evaluation of upper abdominal pain x 2 weeks.  Patient reports he is having increased and reflux symptoms and has been coughing up material that looks like coffee grounds.  Patient reports that he has been sticking to a bland diet and only drinking water but it is getting worse rather than better.  Patient states that he saw [facility] in the past, and he was instructed to come to the ED when he contacted them.  Patient reports that in addition to this he feels like his blood pressure is spiking.  Patient has had nausea and dry heaving.  Patient has tried over-the-counter agents without improvement.  He takes pantoprazole and sucralfate at home.  He was admitted to the hospital about a year ago for the same symptoms.  Patient denies changes in bowel or bladder function, diabetes, hematochezia, melena, recent surgery, and changes in medications.  No increased peripheral edema, headache, or vision changes.  He does not know how high his blood pressure has been, but he states that when he took it before he came here the top number was over 160.    05/18/25:  Patient confirms the HPI noted above including approximately 1 month history of some intermittent epigastric and chest pain described as a sharp burning type pain.  Patient notes he needs to sleep upright to prevent symptoms from occurring and he has had some noticeable increase in his blood pressure recently.  No nausea, vomiting, cough, fever or palpitations are reported.  He does not smoke or drink alcohol and very rarely uses NSAIDs.  He does have a history of gastritis and esophagitis and has had an EGD within the past year            Past Medical History:    Diagnosis Date    Anxiety     GERD (gastroesophageal reflux disease)      Past Surgical History:   Procedure Laterality Date    ENDOSCOPY N/A 8/12/2024    Procedure: ESOPHAGOGASTRODUODENOSCOPY;  Surgeon: Oseas Pfeiffer MD;  Location: [facility] ENDOSCOPY;  Service: Gastroenterology;  Laterality: N/A;  POST-EROSIVE ESOPHAGITIS     History reviewed. No pertinent family history.  Social History     Tobacco Use    Smoking status: Former     Types: Cigars    Smokeless tobacco: Former     Types: Chew   Vaping Use    Vaping status: Never Used   Substance Use Topics    Alcohol use: Not Currently    Drug use: Not Currently     Types: Marijuana     Comment: occ     Medications Prior to Admission   Medication Sig Dispense Refill Last Dose/Taking    pantoprazole (PROTONIX) 40 MG EC tablet Take 1 tablet by mouth 2 (Two) Times a Day.   5/17/2025 Morning    sucralfate (CARAFATE) 1 g tablet Take 1 tablet by mouth 4 (Four) Times a Day.   5/17/2025 at  1:00 PM     Allergies:  Prednisone    Immunization History   Administered Date(s) Administered    COVID-19 (PFIZER) Purple Cap Monovalent 04/07/2021, 04/30/2021, 12/09/2021           REVIEW OF SYSTEMS:    Review of Systems   Constitutional: Negative.   HENT: Negative.     Eyes: Negative.    Cardiovascular: Negative.    Respiratory: Negative.     Skin: Negative.    Musculoskeletal: Negative.    Gastrointestinal:  Positive for nausea. Negative for vomiting.   Genitourinary: Negative.    Neurological: Negative.    Psychiatric/Behavioral: Negative.       Vital Signs  Temp:  [97.6 °F (36.4 °C)-98 °F (36.7 °C)] 97.8 °F (36.6 °C)  Heart Rate:  [63-74] 66  Resp:  [12-18] 14  BP: (107-147)/(56-82) 129/73          Physical Exam:  Physical Exam  Vitals reviewed.   Constitutional:       General: He is not in acute distress.     Appearance: Normal appearance. He is normal weight. He is not ill-appearing, toxic-appearing or diaphoretic.   HENT:      Head: Normocephalic.      Right Ear:  External ear normal.      Left Ear: External ear normal.      Nose: Nose normal.      Mouth/Throat:      Mouth: Mucous membranes are moist.   Eyes:      Extraocular Movements: Extraocular movements intact.   Cardiovascular:      Rate and Rhythm: Normal rate and regular rhythm.      Pulses: Normal pulses.      Heart sounds: Normal heart sounds.   Pulmonary:      Effort: Pulmonary effort is normal.      Breath sounds: Normal breath sounds.   Abdominal:      General: Bowel sounds are normal.      Palpations: Abdomen is soft.      Tenderness: There is no abdominal tenderness.   Musculoskeletal:         General: Normal range of motion.      Cervical back: Normal range of motion.      Right lower leg: No edema.      Left lower leg: No edema.   Skin:     General: Skin is warm and dry.      Capillary Refill: Capillary refill takes less than 2 seconds.   Neurological:      General: No focal deficit present.      Mental Status: He is alert and oriented to person, place, and time.   Psychiatric:         Mood and Affect: Mood normal.         Behavior: Behavior normal.         Thought Content: Thought content normal.         Judgment: Judgment normal.       Emotional Behavior:   Normal   Debilities:  None  Results Review:    I reviewed the patient's new clinical results.  Lab Results (most recent)       Procedure Component Value Units Date/Time    Basic Metabolic Panel [[id number]]  (Abnormal) Collected: 05/18/25 0633    Specimen: Blood Updated: 05/18/25 0710     Glucose 107 mg/dL      BUN 13 mg/dL      Creatinine 1.00 mg/dL      Sodium 143 mmol/L      Potassium 4.1 mmol/L      Chloride 107 mmol/L      CO2 24.2 mmol/L      Calcium 9.4 mg/dL      BUN/Creatinine Ratio 13.0     Anion Gap 11.8 mmol/L      eGFR 95.8 mL/min/1.73     Narrative:      GFR Categories in Chronic Kidney Disease (CKD)              GFR Category          GFR (mL/min/1.73)    Interpretation  G1                    90 or greater        Normal or high (1)  G2                     60-89                Mild decrease (1)  G3a                   45-59                Mild to moderate decrease  G3b                   30-44                Moderate to severe decrease  G4                    15-29                Severe decrease  G5                    14 or less           Kidney failure    (1)In the absence of evidence of kidney disease, neither GFR category G1 or G2 fulfill the criteria for CKD.    eGFR calculation 2021 CKD-EPI creatinine equation, which does not include race as a factor    CBC Auto Differential [[id number]]  (Abnormal) Collected: 05/18/25 0633    Specimen: Blood Updated: 05/18/25 0645     WBC 8.01 10*3/mm3      RBC 4.56 10*6/mm3      Hemoglobin 15.1 g/dL      Hematocrit 44.4 %      MCV 97.4 fL      MCH 33.1 pg      MCHC 34.0 g/dL      RDW 11.2 %      RDW-SD 40.3 fl      MPV 9.9 fL      Platelets 179 10*3/mm3      Neutrophil % 78.0 %      Lymphocyte % 14.0 %      Monocyte % 6.9 %      Eosinophil % 0.5 %      Basophil % 0.4 %      Immature Grans % 0.2 %      Neutrophils, Absolute 6.25 10*3/mm3      Lymphocytes, Absolute 1.12 10*3/mm3      Monocytes, Absolute 0.55 10*3/mm3      Eosinophils, Absolute 0.04 10*3/mm3      Basophils, Absolute 0.03 10*3/mm3      Immature Grans, Absolute 0.02 10*3/mm3      nRBC 0.0 /100 WBC     High Sensitivity Troponin T 1Hr [[id number]] Collected: 05/17/25 1815    Specimen: Blood Updated: 05/17/25 1840     HS Troponin T <6 ng/L      Troponin T Numeric Delta --     Comment: Unable to calculate.       Narrative:      High Sensitive Troponin T Reference Range:  <14.0 ng/L- Negative Female for AMI  <22.0 ng/L- Negative Male for AMI  >=14 - Abnormal Female indicating possible myocardial injury.  >=22 - Abnormal Male indicating possible myocardial injury.   Clinicians would have to utilize clinical acumen, EKG, Troponin, and serial changes to determine if it is an Acute Myocardial Infarction or myocardial injury due to an underlying chronic condition.          Urine Drug Screen - Urine, Clean Catch [[id number]]  (Abnormal) Collected: 05/17/25 1723    Specimen: Urine, Clean Catch Updated: 05/17/25 1745     THC, Screen, Urine Positive     Phencyclidine (PCP), Urine Negative     Cocaine Screen, Urine Negative     Methamphetamine, Ur Negative     Opiate Screen Negative     Amphetamine Screen, Urine Negative     Benzodiazepine Screen, Urine Negative     Tricyclic Antidepressants Screen Negative     Methadone Screen, Urine Negative     Barbiturates Screen, Urine Negative     Oxycodone Screen, Urine Positive     Buprenorphine, Screen, Urine Negative    Narrative:      Cutoff For Drugs Screened:    Amphetamines               500 ng/ml  Barbiturates               200 ng/ml  Benzodiazepines            150 ng/ml  Cocaine                    150 ng/ml  Methadone                  200 ng/ml  Opiates                    100 ng/ml  Phencyclidine               25 ng/ml  THC                         50 ng/ml  Methamphetamine            500 ng/ml  Tricyclic Antidepressants  300 ng/ml  Oxycodone                  100 ng/ml  Buprenorphine               10 ng/ml    The normal value for all drugs tested is negative. This report includes unconfirmed screening results, with the cutoff values listed, to be used for medical treatment purposes only.  Unconfirmed results must not be used for non-medical purposes such as employment or legal testing.  Clinical consideration should be applied to any drug of abuse test, particularly when unconfirmed results are used.    All urine drugs of abuse requests without chain of custody are for medical screening purposes only.  False positives are possible.      Comprehensive Metabolic Panel [[id number]]  (Abnormal) Collected: 05/17/25 1704    Specimen: Blood Updated: 05/17/25 1743     Glucose 97 mg/dL      BUN 13 mg/dL      Creatinine 1.07 mg/dL      Sodium 140 mmol/L      Potassium 3.6 mmol/L      Chloride 103 mmol/L      CO2 28.1 mmol/L      Calcium 9.3 mg/dL       Total Protein 6.6 g/dL      Albumin 4.5 g/dL      ALT (SGPT) 19 U/L      AST (SGOT) 18 U/L      Alkaline Phosphatase 45 U/L      Total Bilirubin 1.6 mg/dL      Globulin 2.1 gm/dL      A/G Ratio 2.1 g/dL      BUN/Creatinine Ratio 12.1     Anion Gap 8.9 mmol/L      eGFR 88.3 mL/min/1.73     Narrative:      GFR Categories in Chronic Kidney Disease (CKD)              GFR Category          GFR (mL/min/1.73)    Interpretation  G1                    90 or greater        Normal or high (1)  G2                    60-89                Mild decrease (1)  G3a                   45-59                Mild to moderate decrease  G3b                   30-44                Moderate to severe decrease  G4                    15-29                Severe decrease  G5                    14 or less           Kidney failure    (1)In the absence of evidence of kidney disease, neither GFR category G1 or G2 fulfill the criteria for CKD.    eGFR calculation 2021 CKD-EPI creatinine equation, which does not include race as a factor    Lipase [[id number]]  (Normal) Collected: 05/17/25 1704    Specimen: Blood Updated: 05/17/25 1743     Lipase 26 U/L     BNP [[id number]]  (Normal) Collected: 05/17/25 1704    Specimen: Blood Updated: 05/17/25 1743     proBNP <36.0 pg/mL     Narrative:      This assay is used as an aid in the diagnosis of individuals suspected of having heart failure. It can be used as an aid in the diagnosis of acute decompensated heart failure (ADHF) in patients presenting with signs and symptoms of ADHF to the emergency department (ED). In addition, NT-proBNP of <300 pg/mL indicates ADHF is not likely.    Age Range Result Interpretation  NT-proBNP Concentration (pg/mL:      <50             Positive            >450                   Gray                 300-450                    Negative             <300    50-75           Positive            >900                  Gray                300-900                  Negative             <300      >75             Positive            >1800                  Gray                300-1800                  Negative            <300    High Sensitivity Troponin T [[id number]]  (Normal) Collected: 05/17/25 1704    Specimen: Blood Updated: 05/17/25 1743     HS Troponin T <6 ng/L     Narrative:      High Sensitive Troponin T Reference Range:  <14.0 ng/L- Negative Female for AMI  <22.0 ng/L- Negative Male for AMI  >=14 - Abnormal Female indicating possible myocardial injury.  >=22 - Abnormal Male indicating possible myocardial injury.   Clinicians would have to utilize clinical acumen, EKG, Troponin, and serial changes to determine if it is an Acute Myocardial Infarction or myocardial injury due to an underlying chronic condition.         Ethanol [[id number]] Collected: 05/17/25 1704    Specimen: Blood Updated: 05/17/25 1743     Ethanol % <0.010 %     Narrative:      Plasma Ethanol Clinical Symptoms:    ETOH (%)               Clinical Symptom  .01-.05              No apparent influence  .03-.12              Euphoria, Diminished judgment and attention   .09-.25              Impaired comprehension, Muscle incoordination  .18-.30              Confusion, Staggered gait, Slurred speech  .25-.40              Markedly decreased response to stimuli, unable to stand or                        walk, vomitting, sleep or stupor  .35-.50              Comatose, Anesthesia, Subnormal body temperature        Magnesium [[id number]]  (Normal) Collected: 05/17/25 1704    Specimen: Blood Updated: 05/17/25 1743     Magnesium 2.4 mg/dL     Phosphorus [[id number]]  (Normal) Collected: 05/17/25 1704    Specimen: Blood Updated: 05/17/25 1743     Phosphorus 3.8 mg/dL     TSH Rfx On Abnormal To Free T4 [[id number]]  (Normal) Collected: 05/17/25 1704    Specimen: Blood Updated: 05/17/25 1743     TSH 1.160 uIU/mL     Urinalysis With Culture If Indicated - Urine, Clean Catch [[id number]]  (Normal) Collected: 05/17/25 1723    Specimen: Urine, Clean  Catch Updated: 05/17/25 1732     Color, UA Yellow     Appearance, UA Clear     pH, UA 7.0     Specific Gravity, UA 1.007     Glucose, UA Negative     Ketones, UA Negative     Bilirubin, UA Negative     Blood, UA Negative     Protein, UA Negative     Leuk Esterase, UA Negative     Nitrite, UA Negative     Urobilinogen, UA 0.2 E.U./dL    Narrative:      In absence of clinical symptoms, the presence of pyuria, bacteria, and/or nitrites on the urinalysis result does not correlate with infection.  Urine microscopic not indicated.    Blood Culture - Blood, Arm, Left [[id number]] Collected: 05/17/25 1716    Specimen: Blood from Arm, Left Updated: 05/17/25 1724    Extra Tubes [[id number]] Collected: 05/17/25 1704    Specimen: Blood, Venous Line Updated: 05/17/25 1715    Narrative:      The following orders were created for panel order Extra Tubes.  Procedure                               Abnormality         Status                     ---------                               -----------         ------                     Gold Top - SST[[id number]]                                   Final result               Light Blue Top[[id number]]                                   Final result                 Please view results for these tests on the individual orders.    Gold Top - SST [[id number]] Collected: 05/17/25 1704    Specimen: Blood Updated: 05/17/25 1715     Extra Tube Hold for add-ons.     Comment: Auto resulted.       Light Blue Top [[id number]] Collected: 05/17/25 1704    Specimen: Blood Updated: 05/17/25 1715     Extra Tube Hold for add-ons.     Comment: Auto resulted       CBC & Differential [[id number]]  (Abnormal) Collected: 05/17/25 1704    Specimen: Blood Updated: 05/17/25 1711    Narrative:      The following orders were created for panel order CBC & Differential.  Procedure                               Abnormality         Status                     ---------                               -----------         ------                      CBC Auto Differential[[id number]]        Abnormal            Final result                 Please view results for these tests on the individual orders.    CBC Auto Differential [[id number]]  (Abnormal) Collected: 05/17/25 1704    Specimen: Blood Updated: 05/17/25 1711     WBC 6.73 10*3/mm3      RBC 4.30 10*6/mm3      Hemoglobin 14.1 g/dL      Hematocrit 42.0 %      MCV 97.7 fL      MCH 32.8 pg      MCHC 33.6 g/dL      RDW 11.2 %      RDW-SD 40.7 fl      MPV 10.2 fL      Platelets 191 10*3/mm3      Neutrophil % 56.9 %      Lymphocyte % 29.7 %      Monocyte % 11.7 %      Eosinophil % 1.2 %      Basophil % 0.4 %      Immature Grans % 0.1 %      Neutrophils, Absolute 3.82 10*3/mm3      Lymphocytes, Absolute 2.00 10*3/mm3      Monocytes, Absolute 0.79 10*3/mm3      Eosinophils, Absolute 0.08 10*3/mm3      Basophils, Absolute 0.03 10*3/mm3      Immature Grans, Absolute 0.01 10*3/mm3      nRBC 0.0 /100 WBC     Blood Culture - Blood, Arm, Right [[id number]] Collected: 05/17/25 1704    Specimen: Blood from Arm, Right Updated: 05/17/25 1707    POC Lactate [[id number]]  (Normal) Collected: 05/17/25 1657    Specimen: Blood Updated: 05/17/25 1658     Lactate 0.5 mmol/L      Comment: Serial Number: [id number]:  [id number]               Imaging Results (Most Recent)       Procedure Component Value Units Date/Time    XR Chest 1 View [[id number]] Collected: 05/17/25 1755     Updated: 05/17/25 1758    Narrative:      XR CHEST 1 VW    Date of Exam: 5/17/2025 5:51 PM EDT    Indication: epigastric pain    Comparison: Single view chest radiograph from 8/10/2024    Findings:  The lungs are clear. Cardiac, hilar, and mediastinal silhouettes are within normal limits. No pneumothorax or pleural effusions. The trachea is midline. Pulmonary vascularity is normal. Visualized bony structures are intact.        Impression:      Impression:  No active cardiopulmonary disease      Electronically Signed: Florin Lopez DO    5/17/2025  5:56 PM EDT    Workstation ID: [id number]          reviewed    ECG/EMG Results (most recent)       Procedure Component Value Units Date/Time    ECG 12 Lead Chest Pain [[id number]] Collected: 05/17/25 1713     Updated: 05/17/25 1714     QT Interval 399 ms      QTC Interval 419 ms     Narrative:      HEART RATE=66  bpm  RR Oavinhlu=649  ms  GA Xlqxejwm=314  ms  P Horizontal Axis=6  deg  P Front Axis=52  deg  QRSD Axcdqjzp=583  ms  QT Bfgtacah=875  ms  VKqL=651  ms  QRS Axis=-45  deg  T Wave Axis=17  deg  - ABNORMAL ECG -  Sinus rhythm  Borderline prolonged GA interval  Left anterior fascicular block  Date and Time of Study:2025-05-17 17:13:03    ECG 12 Lead Chest Pain [[id number]] Collected: 05/17/25 2009     Updated: 05/17/25 2011     QT Interval 422 ms      QTC Interval 383 ms     Narrative:      HEART RATE=49  bpm  RR Srqogxva=8106  ms  GA Mivdthum=235  ms  P Horizontal Axis=-6  deg  P Front Axis=50  deg  QRSD Gedgtpzf=525  ms  QT Pjusmmlk=954  ms  PIsI=122  ms  QRS Axis=-43  deg  T Wave Axis=11  deg  - ABNORMAL ECG -  Sinus bradycardia  Left anterior fascicular block  Date and Time of Study:2025-05-17 20:09:43          reviewed    Results for orders placed during the hospital encounter of 08/10/24    Duplex Venous Lower Extremity - Bilateral CAR    Interpretation Summary    Normal bilateral lower extremity venous duplex scan.          Microbiology Results (last 10 days)       ** No results found for the last 240 hours. **            Assessment & Plan     Epigastric pain       Abdominal pain  Lab Results   Component Value Date    WBC 8.01 05/18/2025    AST 18 05/17/2025    ALT 19 05/17/2025    ALKPHOS 45 05/17/2025    BILITOT 1.6 (H) 05/17/2025    LIPASE 26 05/17/2025   TSH: 1.160  - UA unremarkable  - Urine tox positive for oxycodone and THC  - Ethanol: Less than 0.010  - Chest x-ray showed no active cardiopulmonary process  - EKG showed sinus rhythm at 66 without obvious acute changes with a first-degree AV block and  W[place] interval of 207 ms with a QTc of 419 ms  -In the ED IV pantoprazole  -Continue pantoprazole and Carafate  - GI consulted in the ED who recommended continuing PPI send prescription for voquenza  - Cardiology consulted who did note repeat EKG which showed sinus bradycardia at 49 with a left anterior fascicular block but no other obvious acute changes and reviewed previous testing recommending no further workup at this time      I discussed the patients findings and my recommendations with patient and nursing staff.     Discharge Diagnosis:      Epigastric pain      Hospital Course  Patient is a 45 y.o. male presented with abdominal and chest pain with an HPI and.  Serial troponins were assessed remain less than 6 with a proBNP of less than 36 points here.  CMP and CBC was generally unremarkable and lipase was within normal notes of 26 with a TSH of 1.160.  UA was unremarkable and urine tox was positive for oxycodone and THC..  Was given IV pantoprazole in the ED and GI was consulted who evaluated patient and recommended continuing PPI as well as Carafate and sent prescription for Voquenza and recommended outpatient follow-up.  At this time patient is felt to be in good condition for discharge with close follow-up with his PCP as well as GI on an outpatient basis.  His full testing/results and plan were discussed with patient along with concerning/alarm symptoms for which to call 911/return to the ED.  All questions were answered and he verbalizes his understanding and agreement.    Past Medical History:     Past Medical History:   Diagnosis Date    Anxiety     GERD (gastroesophageal reflux disease)        Past Surgical History:     Past Surgical History:   Procedure Laterality Date    ENDOSCOPY N/A 8/12/2024    Procedure: ESOPHAGOGASTRODUODENOSCOPY;  Surgeon: Oseas Pfeiffer MD;  Location: [facility] ENDOSCOPY;  Service: Gastroenterology;  Laterality: N/A;  POST-EROSIVE ESOPHAGITIS       Social History:   Social  History     Socioeconomic History    Marital status:    Tobacco Use    Smoking status: Former     Types: Cigars    Smokeless tobacco: Former     Types: Chew   Vaping Use    Vaping status: Never Used   Substance and Sexual Activity    Alcohol use: Not Currently    Drug use: Not Currently     Types: Marijuana     Comment: occ    Sexual activity: Defer       Procedures Performed         Consults:   Consults       Date and Time Order Name Status Description    5/17/2025  8:21 PM IP Consult to Cardiology      5/17/2025  7:24 PM Inpatient Gastroenterology Consult              Condition on Discharge:     Stable    Discharge Disposition      Discharge Medications     Discharge Medications        ASK your doctor about these medications        Instructions Start Date   pantoprazole 40 MG EC tablet  Commonly known as: PROTONIX  Ask about: Which instructions should I use?   40 mg, 2 Times Daily      sucralfate 1 g tablet  Commonly known as: CARAFATE   1 g, 4 Times Daily               Discharge Diet:     Activity at Discharge:     Follow-up Appointments  No future appointments.      Test Results Pending at Discharge  Pending Results       Procedure [Order ID] Specimen - Date/Time    Blood Culture - Blood, Arm, Left [[id number]] Collected: 05/17/25 1716    Specimen: Blood from Arm, Left Updated: 05/17/25 1724    Blood Culture - Blood, Arm, Right [[id number]] Collected: 05/17/25 1704    Specimen: Blood from Arm, Right Updated: 05/17/25 1707             Risk for Readmission (LACE) Score: 1 (5/18/2025  6:00 AM)      Greater than 30 minutes spent in discharge activities for this patient    Signature:Electronically signed by Hu Lam PA-C, 05/18/25, 4:30 PM EDT.

## 2025-05-18 NOTE — PLAN OF CARE
Problem: Adult Inpatient Plan of Care  Goal: Plan of Care Review  5/18/2025 1548 by Luz Berger RN  Outcome: Met  5/18/2025 1451 by Luz Berger RN  Flowsheets (Taken 5/18/2025 1451)  Plan of Care Reviewed With: patient  Goal: Patient-Specific Goal (Individualized)  Outcome: Met  Goal: Absence of Hospital-Acquired Illness or Injury  Outcome: Met  Intervention: Identify and Manage Fall Risk  Recent Flowsheet Documentation  Taken 5/18/2025 1223 by Luz Berger RN  Safety Promotion/Fall Prevention: safety round/check completed  Taken 5/18/2025 1026 by Luz Berger RN  Safety Promotion/Fall Prevention: safety round/check completed  Taken 5/18/2025 0800 by Luz Berger RN  Safety Promotion/Fall Prevention: safety round/check completed  Taken 5/18/2025 0739 by Luz Berger RN  Safety Promotion/Fall Prevention:   safety round/check completed   clutter free environment maintained   assistive device/personal items within reach  Intervention: Prevent Skin Injury  Recent Flowsheet Documentation  Taken 5/18/2025 0739 by Luz Berger RN  Body Position:   position changed independently   sitting up in bed  Skin Protection: transparent dressing maintained  Intervention: Prevent and Manage VTE (Venous Thromboembolism) Risk  Recent Flowsheet Documentation  Taken 5/18/2025 0739 by Luz Berger RN  VTE Prevention/Management: patient refused intervention  Intervention: Prevent Infection  Recent Flowsheet Documentation  Taken 5/18/2025 1223 by Luz Berger RN  Infection Prevention:   single patient room provided   rest/sleep promoted  Taken 5/18/2025 1026 by Luz Berger RN  Infection Prevention:   rest/sleep promoted   single patient room provided  Taken 5/18/2025 0800 by Luz Berger RN  Infection Prevention:   single patient room provided   rest/sleep promoted  Taken 5/18/2025 0739 by Luz Berger RN  Infection Prevention:   single patient room provided   rest/sleep promoted  Goal: Optimal  Comfort and Wellbeing  Outcome: Met  Intervention: Monitor Pain and Promote Comfort  Recent Flowsheet Documentation  Taken 5/18/2025 0907 by Luz Berger, RN  Pain Management Interventions: pain medication given  Taken 5/18/2025 0739 by Luz Berger, RN  Pain Management Interventions:   pain management plan reviewed with patient/caregiver   medication offered but refused  Intervention: Provide Person-Centered Care  Recent Flowsheet Documentation  Taken 5/18/2025 0739 by Luz Berger RN  Trust Relationship/Rapport:   care explained   choices provided   questions answered   questions encouraged  Goal: Readiness for Transition of Care  Outcome: Met   Goal Outcome Evaluation:  Plan of Care Reviewed With: patient

## 2025-05-18 NOTE — CONSULTS
"[facility] CARDIOLOGY CONSULT  [facility]        Subjective:     Encounter Date:05/17/2025      Patient ID: Jass Odell is a 45 y.o. male.    Chief Complaint: epigastric pain      HPI:  Jass Odell is a 45 y.o. male seen by Dr. Carrillo in the past for abnormal nuclear stress test in which he reportedly had a normal cath and normal echo in 6/2024.  PMH includes KEVON (compliant with CPAP) and DVT in which patient has been off anticoagulation.  Patient presents with complaints of abdominal pain and coffee-ground emesis without anemia.  GI has evaluated and has no plan for endoscopy.  Patient was observed with sinus bradycardia down to 30s at midnight and cardiology was consulted for further evaluation.    Patient tells me he is compliant with his CPAP but did not have it last night.  He is active and able to do 10 mile bike rides feeling \"great\".  He denies any shortness of breath.  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  He tells me he hydrates well.  He has had ongoing intermittent chest discomfort over years with meals and sometimes accompanied by belching and indigestion.      Past Medical History:   Diagnosis Date    Anxiety     GERD (gastroesophageal reflux disease)          Past Surgical History:   Procedure Laterality Date    ENDOSCOPY N/A 8/12/2024    Procedure: ESOPHAGOGASTRODUODENOSCOPY;  Surgeon: Oseas Pfeiffer MD;  Location: [facility] ENDOSCOPY;  Service: Gastroenterology;  Laterality: N/A;  POST-EROSIVE ESOPHAGITIS         Social History     Socioeconomic History    Marital status:    Tobacco Use    Smoking status: Former     Types: Cigars    Smokeless tobacco: Former     Types: Chew   Vaping Use    Vaping status: Never Used   Substance and Sexual Activity    Alcohol use: Not Currently    Drug use: Not Currently     Types: Marijuana     Comment: occ    Sexual activity: Defer       History reviewed. No " "pertinent family history.  No premature CAD of a first-degree relative    Allergies   Allergen Reactions    Prednisone GI Bleeding     PO prednisone causes GI bleeding per patient       Current Medications:   Scheduled Meds:pantoprazole, 40 mg, Intravenous, BID AC  sodium chloride, 10 mL, Intravenous, Q12H  sucralfate, 1 g, Oral, 4x Daily      Continuous Infusions:     ROS  All other systems reviewed and are negative.       Objective:         /73 (BP Location: Right arm, Patient Position: Lying)   Pulse 66   Temp 97.8 °F (36.6 °C) (Oral)   Resp 14   Ht 177.8 cm (70\")   Wt 112 kg (246 lb)   SpO2 93%   BMI 35.30 kg/m²       General: Well-developed in NAD.  Neuro: AAOx3. No gross deficits.  HEENT: Sclerae clear, no xanthelasmas.  CV: S1S2, RRR. No murmurs or gallops.  Resp: Breathing is unlabored. Lungs CTA throughout.  GI: BS+. Abdomen soft and NTTP.  Ext: Pedal pulses are palpable. Extremities are nonedematous.  MS: moves all extremities, no weakness.  Skin: warm, dry.  Psych: calm and cooperative.            Lab Review:     Results from last 7 days   Lab Units 05/18/25  0633 05/17/25  1704   SODIUM mmol/L 143 140   POTASSIUM mmol/L 4.1 3.6   CHLORIDE mmol/L 107 103   CO2 mmol/L 24.2 28.1   BUN mg/dL 13 13   CREATININE mg/dL 1.00 1.07   GLUCOSE mg/dL 107* 97   CALCIUM mg/dL 9.4 9.3   AST (SGOT) U/L  --  18   ALT (SGPT) U/L  --  19     Results from last 7 days   Lab Units 05/17/25  1815 05/17/25  1704   HSTROP T ng/L <6 <6     Results from last 7 days   Lab Units 05/18/25  0633 05/17/25  1704   WBC 10*3/mm3 8.01 6.73   HEMOGLOBIN g/dL 15.1 14.1   HEMATOCRIT % 44.4 42.0   PLATELETS 10*3/mm3 179 191         Results from last 7 days   Lab Units 05/17/25  1704   MAGNESIUM mg/dL 2.4           Invalid input(s): \"LDLCALC\"  Results from last 7 days   Lab Units 05/17/25  1704   PROBNP pg/mL <36.0     Results from last 7 days   Lab Units 05/17/25  1704   TSH uIU/mL 1.160       Recent Radiology:  Imaging Results " (Most Recent)       Procedure Component Value Units Date/Time    XR Chest 1 View [[id number]] Collected: 05/17/25 1755     Updated: 05/17/25 1758    Narrative:      XR CHEST 1 VW    Date of Exam: 5/17/2025 5:51 PM EDT    Indication: epigastric pain    Comparison: Single view chest radiograph from 8/10/2024    Findings:  The lungs are clear. Cardiac, hilar, and mediastinal silhouettes are within normal limits. No pneumothorax or pleural effusions. The trachea is midline. Pulmonary vascularity is normal. Visualized bony structures are intact.        Impression:      Impression:  No active cardiopulmonary disease      Electronically Signed: Florin Lopez DO    5/17/2025 5:56 PM EDT    Workstation ID: [id number]              ECHOCARDIOGRAM:              Assessment:         Active Hospital Problems    Diagnosis  POA    **Epigastric pain [R10.13]  Yes     1) asymptomatic bradycardia  - troponin negative x 2  - SB down to 30s over night (without CPAP)  - K 4.1, Mg 2.1  - TSH WNL  - CXR shows no acute findings  - not on AV luther blockers    2) Epigastric pain  - GI consulted --> no plan for intervention  - Hgb 15.1    3) KEVON    4) hx DVT  - off anticoagulation           Plan:   Tele shows asymptomatic nocturnal SB down to 30s in absence of CPAP in a conditioned recreational cyclist with recent cardiac work-up last year as above.  Will request records for review.  Do not anticipate additional cardiac work-up.  Further recommendations per attending cardiologist.         Electronically signed by SHAUNA Marcelo, 05/18/25, 12:03 PM EDT.

## 2025-05-19 LAB
QT INTERVAL: 399 MS
QT INTERVAL: 422 MS
QTC INTERVAL: 383 MS
QTC INTERVAL: 419 MS

## 2025-05-22 ENCOUNTER — HOSPITAL ENCOUNTER (INPATIENT)
Facility: HOSPITAL | Age: 45
LOS: 2 days | Discharge: HOME | DRG: 176 | End: 2025-05-26
Attending: EMERGENCY MEDICINE | Admitting: INTERNAL MEDICINE
Payer: COMMERCIAL

## 2025-05-22 ENCOUNTER — APPOINTMENT (OUTPATIENT)
Dept: CT IMAGING | Facility: HOSPITAL | Age: 45
End: 2025-05-22
Payer: COMMERCIAL

## 2025-05-22 ENCOUNTER — APPOINTMENT (OUTPATIENT)
Dept: ULTRASOUND IMAGING | Facility: HOSPITAL | Age: 45
End: 2025-05-22
Payer: COMMERCIAL

## 2025-05-22 ENCOUNTER — APPOINTMENT (OUTPATIENT)
Dept: GENERAL RADIOLOGY | Facility: HOSPITAL | Age: 45
DRG: 176 | End: 2025-05-22
Payer: COMMERCIAL

## 2025-05-22 ENCOUNTER — HOSPITAL ENCOUNTER (EMERGENCY)
Facility: HOSPITAL | Age: 45
Discharge: HOME | End: 2025-05-22
Attending: EMERGENCY MEDICINE
Payer: COMMERCIAL

## 2025-05-22 ENCOUNTER — APPOINTMENT (OUTPATIENT)
Dept: CT IMAGING | Facility: HOSPITAL | Age: 45
DRG: 176 | End: 2025-05-22
Payer: COMMERCIAL

## 2025-05-22 VITALS
BODY MASS INDEX: 33.57 KG/M2 | TEMPERATURE: 98.5 F | WEIGHT: 234.5 LBS | SYSTOLIC BLOOD PRESSURE: 124 MMHG | HEART RATE: 64 BPM | OXYGEN SATURATION: 96 % | DIASTOLIC BLOOD PRESSURE: 73 MMHG | RESPIRATION RATE: 16 BRPM | HEIGHT: 70 IN

## 2025-05-22 DIAGNOSIS — R10.9 RIGHT SIDED ABDOMINAL PAIN: Primary | ICD-10-CM

## 2025-05-22 DIAGNOSIS — I26.99 OTHER ACUTE PULMONARY EMBOLISM WITHOUT ACUTE COR PULMONALE: Primary | ICD-10-CM

## 2025-05-22 LAB
ALBUMIN SERPL-MCNC: 4.7 G/DL (ref 3.5–5.2)
ALBUMIN SERPL-MCNC: 4.8 G/DL (ref 3.5–5.2)
ALBUMIN/GLOB SERPL: 1.8 G/DL
ALBUMIN/GLOB SERPL: 2 G/DL
ALP SERPL-CCNC: 49 U/L (ref 39–117)
ALP SERPL-CCNC: 51 U/L (ref 39–117)
ALT SERPL W P-5'-P-CCNC: 17 U/L (ref 1–41)
ALT SERPL W P-5'-P-CCNC: 21 U/L (ref 1–41)
ANION GAP SERPL CALCULATED.3IONS-SCNC: 13 MMOL/L (ref 5–15)
ANION GAP SERPL CALCULATED.3IONS-SCNC: 17.2 MMOL/L (ref 5–15)
AST SERPL-CCNC: 19 U/L (ref 1–40)
AST SERPL-CCNC: 26 U/L (ref 1–40)
BACTERIA SPEC AEROBE CULT: NORMAL
BACTERIA SPEC AEROBE CULT: NORMAL
BASOPHILS # BLD AUTO: 0.02 10*3/MM3 (ref 0–0.2)
BASOPHILS # BLD AUTO: 0.03 10*3/MM3 (ref 0–0.2)
BASOPHILS NFR BLD AUTO: 0.2 % (ref 0–1.5)
BASOPHILS NFR BLD AUTO: 0.2 % (ref 0–1.5)
BILIRUB SERPL-MCNC: 1.9 MG/DL (ref 0–1.2)
BILIRUB SERPL-MCNC: 2 MG/DL (ref 0–1.2)
BILIRUB UR QL STRIP: NEGATIVE
BUN SERPL-MCNC: 13 MG/DL (ref 6–20)
BUN SERPL-MCNC: 15 MG/DL (ref 6–20)
BUN/CREAT SERPL: 11.8 (ref 7–25)
BUN/CREAT SERPL: 13.6 (ref 7–25)
CALCIUM SPEC-SCNC: 10 MG/DL (ref 8.6–10.5)
CALCIUM SPEC-SCNC: 9.7 MG/DL (ref 8.6–10.5)
CHLORIDE SERPL-SCNC: 100 MMOL/L (ref 98–107)
CHLORIDE SERPL-SCNC: 99 MMOL/L (ref 98–107)
CLARITY UR: CLEAR
CO2 SERPL-SCNC: 21.8 MMOL/L (ref 22–29)
CO2 SERPL-SCNC: 25 MMOL/L (ref 22–29)
COLOR UR: YELLOW
CREAT SERPL-MCNC: 1.1 MG/DL (ref 0.76–1.27)
CREAT SERPL-MCNC: 1.1 MG/DL (ref 0.76–1.27)
DEPRECATED RDW RBC AUTO: 38.8 FL (ref 37–54)
DEPRECATED RDW RBC AUTO: 41 FL (ref 37–54)
EGFRCR SERPLBLD CKD-EPI 2021: 85.4 ML/MIN/1.73
EGFRCR SERPLBLD CKD-EPI 2021: 85.4 ML/MIN/1.73
EOSINOPHIL # BLD AUTO: 0.06 10*3/MM3 (ref 0–0.4)
EOSINOPHIL # BLD AUTO: 0.07 10*3/MM3 (ref 0–0.4)
EOSINOPHIL NFR BLD AUTO: 0.6 % (ref 0.3–6.2)
EOSINOPHIL NFR BLD AUTO: 0.7 % (ref 0.3–6.2)
ERYTHROCYTE [DISTWIDTH] IN BLOOD BY AUTOMATED COUNT: 10.8 % (ref 12.3–15.4)
ERYTHROCYTE [DISTWIDTH] IN BLOOD BY AUTOMATED COUNT: 11.1 % (ref 12.3–15.4)
ERYTHROCYTE [SEDIMENTATION RATE] IN BLOOD: 9 MM/HR (ref 0–15)
GEN 5 1HR TROPONIN T REFLEX: <6 NG/L
GLOBULIN UR ELPH-MCNC: 2.3 GM/DL
GLOBULIN UR ELPH-MCNC: 2.7 GM/DL
GLUCOSE SERPL-MCNC: 94 MG/DL (ref 65–99)
GLUCOSE SERPL-MCNC: 98 MG/DL (ref 65–99)
GLUCOSE UR STRIP-MCNC: NEGATIVE MG/DL
HCT VFR BLD AUTO: 47.1 % (ref 37.5–51)
HCT VFR BLD AUTO: 48.7 % (ref 37.5–51)
HGB BLD-MCNC: 15.6 G/DL (ref 13–17.7)
HGB BLD-MCNC: 16.6 G/DL (ref 13–17.7)
HGB UR QL STRIP.AUTO: NEGATIVE
HOLD SPECIMEN: NORMAL
IMM GRANULOCYTES # BLD AUTO: 0.01 10*3/MM3 (ref 0–0.05)
IMM GRANULOCYTES # BLD AUTO: 0.04 10*3/MM3 (ref 0–0.05)
IMM GRANULOCYTES NFR BLD AUTO: 0.1 % (ref 0–0.5)
IMM GRANULOCYTES NFR BLD AUTO: 0.3 % (ref 0–0.5)
KETONES UR QL STRIP: NEGATIVE
LEUKOCYTE ESTERASE UR QL STRIP.AUTO: NEGATIVE
LIPASE SERPL-CCNC: 62 U/L (ref 13–60)
LYMPHOCYTES # BLD AUTO: 1.42 10*3/MM3 (ref 0.7–3.1)
LYMPHOCYTES # BLD AUTO: 2.57 10*3/MM3 (ref 0.7–3.1)
LYMPHOCYTES NFR BLD AUTO: 17.1 % (ref 19.6–45.3)
LYMPHOCYTES NFR BLD AUTO: 20.9 % (ref 19.6–45.3)
MCH RBC QN AUTO: 32.8 PG (ref 26.6–33)
MCH RBC QN AUTO: 32.9 PG (ref 26.6–33)
MCHC RBC AUTO-ENTMCNC: 33.1 G/DL (ref 31.5–35.7)
MCHC RBC AUTO-ENTMCNC: 34.1 G/DL (ref 31.5–35.7)
MCV RBC AUTO: 96.4 FL (ref 79–97)
MCV RBC AUTO: 99.2 FL (ref 79–97)
MONOCYTES # BLD AUTO: 0.76 10*3/MM3 (ref 0.1–0.9)
MONOCYTES # BLD AUTO: 1.16 10*3/MM3 (ref 0.1–0.9)
MONOCYTES NFR BLD AUTO: 9.2 % (ref 5–12)
MONOCYTES NFR BLD AUTO: 9.4 % (ref 5–12)
NEUTROPHILS NFR BLD AUTO: 6.02 10*3/MM3 (ref 1.7–7)
NEUTROPHILS NFR BLD AUTO: 68.6 % (ref 42.7–76)
NEUTROPHILS NFR BLD AUTO: 72.7 % (ref 42.7–76)
NEUTROPHILS NFR BLD AUTO: 8.44 10*3/MM3 (ref 1.7–7)
NITRITE UR QL STRIP: NEGATIVE
NRBC BLD AUTO-RTO: 0 /100 WBC (ref 0–0.2)
PH UR STRIP.AUTO: 8.5 [PH] (ref 5–8)
PLATELET # BLD AUTO: 218 10*3/MM3 (ref 140–450)
PLATELET # BLD AUTO: 223 10*3/MM3 (ref 140–450)
PMV BLD AUTO: 10.5 FL (ref 6–12)
PMV BLD AUTO: 9.7 FL (ref 6–12)
POTASSIUM SERPL-SCNC: 4.1 MMOL/L (ref 3.5–5.2)
POTASSIUM SERPL-SCNC: 4.2 MMOL/L (ref 3.5–5.2)
PROT SERPL-MCNC: 7 G/DL (ref 6–8.5)
PROT SERPL-MCNC: 7.5 G/DL (ref 6–8.5)
PROT UR QL STRIP: NEGATIVE
RBC # BLD AUTO: 4.75 10*6/MM3 (ref 4.14–5.8)
RBC # BLD AUTO: 5.05 10*6/MM3 (ref 4.14–5.8)
SODIUM SERPL-SCNC: 138 MMOL/L (ref 136–145)
SODIUM SERPL-SCNC: 138 MMOL/L (ref 136–145)
SP GR UR STRIP: 1.01 (ref 1–1.03)
TROPONIN T NUMERIC DELTA: NORMAL
TROPONIN T SERPL HS-MCNC: <6 NG/L
UROBILINOGEN UR QL STRIP: ABNORMAL
WBC NRBC COR # BLD AUTO: 12.31 10*3/MM3 (ref 3.4–10.8)
WBC NRBC COR # BLD AUTO: 8.29 10*3/MM3 (ref 3.4–10.8)
WHOLE BLOOD HOLD COAG: NORMAL
WHOLE BLOOD HOLD SPECIMEN: NORMAL

## 2025-05-22 PROCEDURE — 93005 ELECTROCARDIOGRAM TRACING: CPT | Performed by: EMERGENCY MEDICINE

## 2025-05-22 PROCEDURE — 96372 THER/PROPH/DIAG INJ SC/IM: CPT

## 2025-05-22 PROCEDURE — 74176 CT ABD & PELVIS W/O CONTRAST: CPT

## 2025-05-22 PROCEDURE — 25010000002 ONDANSETRON PER 1 MG: Performed by: EMERGENCY MEDICINE

## 2025-05-22 PROCEDURE — 85025 COMPLETE CBC W/AUTO DIFF WBC: CPT | Performed by: EMERGENCY MEDICINE

## 2025-05-22 PROCEDURE — 25010000002 HYDROMORPHONE PER 4 MG: Performed by: EMERGENCY MEDICINE

## 2025-05-22 PROCEDURE — 81003 URINALYSIS AUTO W/O SCOPE: CPT | Performed by: EMERGENCY MEDICINE

## 2025-05-22 PROCEDURE — 80053 COMPREHEN METABOLIC PANEL: CPT | Performed by: EMERGENCY MEDICINE

## 2025-05-22 PROCEDURE — 76705 ECHO EXAM OF ABDOMEN: CPT

## 2025-05-22 PROCEDURE — 99284 EMERGENCY DEPT VISIT MOD MDM: CPT

## 2025-05-22 PROCEDURE — 96376 TX/PRO/DX INJ SAME DRUG ADON: CPT

## 2025-05-22 PROCEDURE — 99283 EMERGENCY DEPT VISIT LOW MDM: CPT | Performed by: EMERGENCY MEDICINE

## 2025-05-22 PROCEDURE — 25510000001 IOPAMIDOL PER 1 ML: Performed by: EMERGENCY MEDICINE

## 2025-05-22 PROCEDURE — 84484 ASSAY OF TROPONIN QUANT: CPT | Performed by: EMERGENCY MEDICINE

## 2025-05-22 PROCEDURE — 36415 COLL VENOUS BLD VENIPUNCTURE: CPT

## 2025-05-22 PROCEDURE — 25010000002 ENOXAPARIN PER 10 MG: Performed by: EMERGENCY MEDICINE

## 2025-05-22 PROCEDURE — 96374 THER/PROPH/DIAG INJ IV PUSH: CPT

## 2025-05-22 PROCEDURE — 99285 EMERGENCY DEPT VISIT HI MDM: CPT

## 2025-05-22 PROCEDURE — 25010000002 HYDROMORPHONE PER 4 MG

## 2025-05-22 PROCEDURE — 96375 TX/PRO/DX INJ NEW DRUG ADDON: CPT

## 2025-05-22 PROCEDURE — 83690 ASSAY OF LIPASE: CPT | Performed by: NURSE PRACTITIONER

## 2025-05-22 PROCEDURE — 85652 RBC SED RATE AUTOMATED: CPT | Performed by: EMERGENCY MEDICINE

## 2025-05-22 PROCEDURE — 25010000002 KETOROLAC TROMETHAMINE PER 15 MG: Performed by: EMERGENCY MEDICINE

## 2025-05-22 PROCEDURE — 71101 X-RAY EXAM UNILAT RIBS/CHEST: CPT

## 2025-05-22 PROCEDURE — 71275 CT ANGIOGRAPHY CHEST: CPT

## 2025-05-22 RX ORDER — POLYETHYLENE GLYCOL 3350 17 G/17G
17 POWDER, FOR SOLUTION ORAL DAILY PRN
Status: DISCONTINUED | OUTPATIENT
Start: 2025-05-22 | End: 2025-05-26 | Stop reason: HOSPADM

## 2025-05-22 RX ORDER — SODIUM CHLORIDE 9 MG/ML
40 INJECTION, SOLUTION INTRAVENOUS AS NEEDED
Status: DISCONTINUED | OUTPATIENT
Start: 2025-05-22 | End: 2025-05-26 | Stop reason: HOSPADM

## 2025-05-22 RX ORDER — HYDROMORPHONE HYDROCHLORIDE 1 MG/ML
0.5 INJECTION, SOLUTION INTRAMUSCULAR; INTRAVENOUS; SUBCUTANEOUS EVERY 4 HOURS PRN
Status: DISCONTINUED | OUTPATIENT
Start: 2025-05-22 | End: 2025-05-26 | Stop reason: HOSPADM

## 2025-05-22 RX ORDER — ACETAMINOPHEN 325 MG/1
650 TABLET ORAL EVERY 4 HOURS PRN
Status: DISCONTINUED | OUTPATIENT
Start: 2025-05-22 | End: 2025-05-26 | Stop reason: HOSPADM

## 2025-05-22 RX ORDER — SUCRALFATE 1 G/1
1 TABLET ORAL 4 TIMES DAILY
Status: DISCONTINUED | OUTPATIENT
Start: 2025-05-22 | End: 2025-05-26 | Stop reason: HOSPADM

## 2025-05-22 RX ORDER — ONDANSETRON 2 MG/ML
4 INJECTION INTRAMUSCULAR; INTRAVENOUS EVERY 6 HOURS PRN
Status: DISCONTINUED | OUTPATIENT
Start: 2025-05-22 | End: 2025-05-26 | Stop reason: HOSPADM

## 2025-05-22 RX ORDER — HYDROMORPHONE HYDROCHLORIDE 1 MG/ML
0.5 INJECTION, SOLUTION INTRAMUSCULAR; INTRAVENOUS; SUBCUTANEOUS ONCE
Refills: 0 | Status: COMPLETED | OUTPATIENT
Start: 2025-05-22 | End: 2025-05-22

## 2025-05-22 RX ORDER — KETOROLAC TROMETHAMINE 30 MG/ML
30 INJECTION, SOLUTION INTRAMUSCULAR; INTRAVENOUS ONCE
Status: COMPLETED | OUTPATIENT
Start: 2025-05-22 | End: 2025-05-22

## 2025-05-22 RX ORDER — BISACODYL 5 MG/1
5 TABLET, DELAYED RELEASE ORAL DAILY PRN
Status: DISCONTINUED | OUTPATIENT
Start: 2025-05-22 | End: 2025-05-26 | Stop reason: HOSPADM

## 2025-05-22 RX ORDER — ENOXAPARIN SODIUM 150 MG/ML
1 INJECTION SUBCUTANEOUS EVERY 12 HOURS
Status: DISCONTINUED | OUTPATIENT
Start: 2025-05-23 | End: 2025-05-26 | Stop reason: HOSPADM

## 2025-05-22 RX ORDER — ONDANSETRON 4 MG/1
4 TABLET, ORALLY DISINTEGRATING ORAL EVERY 6 HOURS PRN
Status: DISCONTINUED | OUTPATIENT
Start: 2025-05-22 | End: 2025-05-26 | Stop reason: HOSPADM

## 2025-05-22 RX ORDER — SODIUM CHLORIDE 0.9 % (FLUSH) 0.9 %
10 SYRINGE (ML) INJECTION AS NEEDED
Status: DISCONTINUED | OUTPATIENT
Start: 2025-05-22 | End: 2025-05-26 | Stop reason: HOSPADM

## 2025-05-22 RX ORDER — SODIUM CHLORIDE 0.9 % (FLUSH) 0.9 %
10 SYRINGE (ML) INJECTION AS NEEDED
Status: DISCONTINUED | OUTPATIENT
Start: 2025-05-22 | End: 2025-05-22 | Stop reason: HOSPADM

## 2025-05-22 RX ORDER — AMOXICILLIN 250 MG
2 CAPSULE ORAL 2 TIMES DAILY PRN
Status: DISCONTINUED | OUTPATIENT
Start: 2025-05-22 | End: 2025-05-26 | Stop reason: HOSPADM

## 2025-05-22 RX ORDER — IOPAMIDOL 755 MG/ML
100 INJECTION, SOLUTION INTRAVASCULAR
Status: COMPLETED | OUTPATIENT
Start: 2025-05-22 | End: 2025-05-22

## 2025-05-22 RX ORDER — VONOPRAZAN FUMARATE 26.72 MG/1
20 TABLET ORAL DAILY
COMMUNITY

## 2025-05-22 RX ORDER — BISACODYL 10 MG
10 SUPPOSITORY, RECTAL RECTAL DAILY PRN
Status: DISCONTINUED | OUTPATIENT
Start: 2025-05-22 | End: 2025-05-26 | Stop reason: HOSPADM

## 2025-05-22 RX ORDER — ONDANSETRON 2 MG/ML
4 INJECTION INTRAMUSCULAR; INTRAVENOUS ONCE
Status: COMPLETED | OUTPATIENT
Start: 2025-05-22 | End: 2025-05-22

## 2025-05-22 RX ORDER — NITROGLYCERIN 0.4 MG/1
0.4 TABLET SUBLINGUAL
Status: DISCONTINUED | OUTPATIENT
Start: 2025-05-22 | End: 2025-05-26 | Stop reason: HOSPADM

## 2025-05-22 RX ORDER — ENOXAPARIN SODIUM 150 MG/ML
1 INJECTION SUBCUTANEOUS ONCE
Status: COMPLETED | OUTPATIENT
Start: 2025-05-22 | End: 2025-05-22

## 2025-05-22 RX ORDER — SODIUM CHLORIDE 0.9 % (FLUSH) 0.9 %
10 SYRINGE (ML) INJECTION EVERY 12 HOURS SCHEDULED
Status: DISCONTINUED | OUTPATIENT
Start: 2025-05-22 | End: 2025-05-26 | Stop reason: HOSPADM

## 2025-05-22 RX ORDER — OXYCODONE HYDROCHLORIDE 5 MG/1
5 TABLET ORAL EVERY 4 HOURS PRN
Refills: 0 | Status: DISCONTINUED | OUTPATIENT
Start: 2025-05-22 | End: 2025-05-26 | Stop reason: HOSPADM

## 2025-05-22 RX ADMIN — HYDROMORPHONE HYDROCHLORIDE 0.5 MG: 1 INJECTION, SOLUTION INTRAMUSCULAR; INTRAVENOUS; SUBCUTANEOUS at 18:06

## 2025-05-22 RX ADMIN — Medication 10 ML: at 23:25

## 2025-05-22 RX ADMIN — IOPAMIDOL 100 ML: 755 INJECTION, SOLUTION INTRAVENOUS at 17:54

## 2025-05-22 RX ADMIN — SUCRALFATE 1 G: 1 TABLET ORAL at 23:24

## 2025-05-22 RX ADMIN — HYDROMORPHONE HYDROCHLORIDE 0.5 MG: 1 INJECTION, SOLUTION INTRAMUSCULAR; INTRAVENOUS; SUBCUTANEOUS at 23:23

## 2025-05-22 RX ADMIN — KETOROLAC TROMETHAMINE 30 MG: 30 INJECTION INTRAMUSCULAR; INTRAVENOUS at 09:31

## 2025-05-22 RX ADMIN — ONDANSETRON 4 MG: 2 INJECTION, SOLUTION INTRAMUSCULAR; INTRAVENOUS at 18:05

## 2025-05-22 RX ADMIN — ENOXAPARIN SODIUM 105 MG: 150 INJECTION SUBCUTANEOUS at 18:35

## 2025-05-22 NOTE — DISCHARGE INSTRUCTIONS
Follow-up with your doctors as discussed.  Return to emergency care if you have worsening symptoms or for any other concerns

## 2025-05-22 NOTE — FSED PROVIDER NOTE
Subjective   History of Present Illness  45-year-old male complains of right mid abdominal pain.  He says it started this morning about 1/2-hour after he ate.  Says it was severe sharp in nature.  Says the pain was enough to make him nauseated.  Did not have vomiting, no fevers.  He says this has happened before.  Was recently admitted to [facility] for severe GERD, is taking pantoprazole and Carafate.  Has an appointment with GI within the next month.    Review of Systems  As noted in HPI  Past Medical History:   Diagnosis Date    Anxiety     GERD (gastroesophageal reflux disease)        Allergies   Allergen Reactions    Prednisone GI Bleeding     PO prednisone causes GI bleeding per patient       Past Surgical History:   Procedure Laterality Date    ENDOSCOPY N/A 8/12/2024    Procedure: ESOPHAGOGASTRODUODENOSCOPY;  Surgeon: Oseas Pfeiffer MD;  Location: [facility] ENDOSCOPY;  Service: Gastroenterology;  Laterality: N/A;  POST-EROSIVE ESOPHAGITIS       History reviewed. No pertinent family history.    Social History     Socioeconomic History    Marital status:    Tobacco Use    Smoking status: Former     Types: Cigars    Smokeless tobacco: Former     Types: Chew   Vaping Use    Vaping status: Never Used   Substance and Sexual Activity    Alcohol use: Not Currently    Drug use: Not Currently     Types: Marijuana     Comment: occ    Sexual activity: Defer           Objective   Physical Exam    INITIAL VITAL SIGNS: Reviewed by me.  Pulse ox normal  GENERAL: Alert. Well developed and well nourished. No respiratory distress.  HEAD: Normocephalic.   EYES: No conjunctival injection.  ENT: Oral mucosa is moist.  NECK/BACK: Supple. Full range of motion.  RESPIRATORY: Non-labored respirations.  No CVA tenderness to percussion  ABDOMEN: Soft, nondistended, point tender to palpation in the lateral abdominal wall musculature on the right, negative Smith sign no right lower quadrant pain.  EXTREMITIES: No  deformity.  SKIN: Warm and dry. No rashes. No diaphoresis.  NEUROLOGIC: Alert. Normal gait    Procedures           ED Course  ED Course as of 05/22/25 0948   Thu May 22, 2025   0946 H&P as above, DDx include but is not limited to ureterolithiasis, cholelithiasis/cystitis, abdominal wall pain.  Labs showed normal white count normal metabolic panel aside from slightly elevated bili at 2.0, urinalysis without blood or infection, CT scan is without acute abnormality ultrasound shows normal gallbladder.  Patient is resting comfortable at this time, discussed all findings with patient given the lack of acute findings I think he safe for discharge home, will have him follow-up with his GI doctor as scheduled. [RO]      ED Course User Index  [RO] Leonardo Adkins MD                                           Medical Decision Making  Problems Addressed:  Right sided abdominal pain: complicated acute illness or injury    Amount and/or Complexity of Data Reviewed  Labs: ordered. Decision-making details documented in ED Course.  Radiology: ordered. Decision-making details documented in ED Course.    Risk  Prescription drug management.        Final diagnoses:   Right sided abdominal pain       ED Disposition  ED Disposition       ED Disposition   Discharge    Condition   Good    Comment   --               Luan Gomes MD  [address] [facility] [place] [id number] [phone]    Call   To schedule follow-up appointment    Your gastroenterologist    Call   To schedule follow-up appointment         Medication List      No changes were made to your prescriptions during this visit.

## 2025-05-22 NOTE — LETTER
May 23, 2025     Patient: Jass Odell   YOB: 1980   Date of Visit: 5/22/2025       To Whom It May Concern:    Excuse from 5/17/2025 - 6/3/2025. It is my medical opinion that Jass Odell may return to work on 6/4/2025 .           Sincerely,        Tessie Grewal     CC: No Recipients

## 2025-05-22 NOTE — Clinical Note
[place] [id number] [place] [address] [place] [address]  Phone: [phone]    Jass Odell was seen and treated in our emergency department on 5/22/2025.  He may return to work on 05/23/2025.         Thank you for choosing [facility].    Leonardo Adkins MD

## 2025-05-22 NOTE — ED PROVIDER NOTES
Subjective     Provider in Triage Note  Due to significant overcrowding in the emergency department patient was initially seen and evaluated in triage.  Provider in triage recommended patient placement in the treatment area to initiate therapy and movement to an ER bed as soon as possible.    Patient presents with complaints of worsening right flank pain.  It started this morning after he ate.  He was seen at [facility] ED at the onset and had labs, CT and US performed.  He was discharged home but his pain became worse with evening.  Right flank and right abdomen.  Some nausea no vomiting.  He took some laxatives and had a bowel movement but reports no relief.        History of Present Illness  Provider in triage note reviewed    History of present illness a 44-year-old male complains of severe pain in the right upper abdomen right chest area sharp in nature nonradiating hurts when he takes deep breath.  He states he has nausea with it but it gets worse whenever he eats or drinks.  He states he breaks out in a sweat.  No significant shortness of breath.  He denies any precordial chest pain neck arm jaw pain he has had a little bit of lightheadedness but no syncope.  No headache vision change speech difficulty leg pain or swelling no recent long car ride plane ride immobilization foreign travels antibiotic use or hospitalization.  He went [facility] the [facility] this morning he had some CTs and ultrasounds and labs and urine was unremarkable and he was discharged home he states the pain became intensely worse especially after he ate.      Review of Systems   Constitutional:  Negative for chills and fever.   Respiratory:  Negative for cough, chest tightness and shortness of breath.    Cardiovascular:  Positive for chest pain. Negative for palpitations and leg swelling.   Gastrointestinal:  Positive for abdominal pain and nausea. Negative for blood in stool and vomiting.   Genitourinary:  Negative for difficulty  urinating, dysuria and hematuria.   Musculoskeletal:  Negative for back pain and neck pain.   Skin:  Negative for rash.   Neurological:  Negative for dizziness and light-headedness.       Past Medical History:   Diagnosis Date    Anxiety     GERD (gastroesophageal reflux disease)        Allergies   Allergen Reactions    Prednisone GI Bleeding     PO prednisone causes GI bleeding per patient       Past Surgical History:   Procedure Laterality Date    ENDOSCOPY N/A 8/12/2024    Procedure: ESOPHAGOGASTRODUODENOSCOPY;  Surgeon: Oseas Pfeiffer MD;  Location: [facility] ENDOSCOPY;  Service: Gastroenterology;  Laterality: N/A;  POST-EROSIVE ESOPHAGITIS       No family history on file.    Social History     Socioeconomic History    Marital status:    Tobacco Use    Smoking status: Former     Types: Cigars    Smokeless tobacco: Former     Types: Chew   Vaping Use    Vaping status: Never Used   Substance and Sexual Activity    Alcohol use: Not Currently    Drug use: Not Currently     Types: Marijuana     Comment: occ    Sexual activity: Defer     Prior to Admission medications    Medication Sig Start Date End Date Taking? Authorizing Provider   ondansetron ODT (ZOFRAN-ODT) 4 MG disintegrating tablet Place 1 tablet on the tongue Every 8 (Eight) Hours As Needed for Nausea or Vomiting. 5/18/25  Yes Hu Lam PA-C   sucralfate (CARAFATE) 1 g tablet Take 1 tablet by mouth 4 (Four) Times a Day. 5/6/25  Yes Maury Esparza MD   Vonoprazan Fumarate (Voquezna) 20 MG tablet Take 1 tablet by mouth Daily.   Yes Maury Esparza MD   pantoprazole (PROTONIX) 40 MG EC tablet Take 1 tablet by mouth 2 (Two) Times a Day.  5/22/25  Maury Esparza MD          Objective   Physical Exam  Constitutional is a 45-year-old awake alert nontoxic.  Triage vital signs reviewed.  Extraocular muscles are intact pupils equal round reactive sclera clear neck supple no adenopathy no JV no bruits back no cervical lumbar  spine there is no CVA tenderness heart is regular without murmur rub lungs are clear no retraction no use of accessories abdomen soft mild right upper quadrant tenderness no rebound no guarding good bowel sounds no peritoneal findings or pulsatile masses extremities pulses equal in all extremities no edema no cords no Homans' sign no evidence of DVT skin warm dry without rash neurologic awake alert and follows commands motor strength normal without focal weakness.  Procedures           ED Course      Results for orders placed or performed during the hospital encounter of 05/22/25   Lipase    Collection Time: 05/22/25  4:42 PM    Specimen: Blood   Result Value Ref Range    Lipase 62 (H) 13 - 60 U/L   Comprehensive Metabolic Panel    Collection Time: 05/22/25  4:42 PM    Specimen: Blood   Result Value Ref Range    Glucose 98 65 - 99 mg/dL    BUN 15 6 - 20 mg/dL    Creatinine 1.10 0.76 - 1.27 mg/dL    Sodium 138 136 - 145 mmol/L    Potassium 4.1 3.5 - 5.2 mmol/L    Chloride 99 98 - 107 mmol/L    CO2 21.8 (L) 22.0 - 29.0 mmol/L    Calcium 10.0 8.6 - 10.5 mg/dL    Total Protein 7.5 6.0 - 8.5 g/dL    Albumin 4.8 3.5 - 5.2 g/dL    ALT (SGPT) 21 1 - 41 U/L    AST (SGOT) 26 1 - 40 U/L    Alkaline Phosphatase 51 39 - 117 U/L    Total Bilirubin 1.9 (H) 0.0 - 1.2 mg/dL    Globulin 2.7 gm/dL    A/G Ratio 1.8 g/dL    BUN/Creatinine Ratio 13.6 7.0 - 25.0    Anion Gap 17.2 (H) 5.0 - 15.0 mmol/L    eGFR 85.4 >60.0 mL/min/1.73   High Sensitivity Troponin T    Collection Time: 05/22/25  4:42 PM    Specimen: Blood   Result Value Ref Range    HS Troponin T <6 <22 ng/L   Lavender Top    Collection Time: 05/22/25  4:42 PM   Result Value Ref Range    Extra Tube hold for add-on    Gold Top - SST    Collection Time: 05/22/25  4:42 PM   Result Value Ref Range    Extra Tube Hold for add-ons.    Light Blue Top    Collection Time: 05/22/25  4:42 PM   Result Value Ref Range    Extra Tube Hold for add-ons.    ECG 12 Lead Chest Pain    Collection  Time: 05/22/25  6:14 PM   Result Value Ref Range    QT Interval 395 ms    QTC Interval 438 ms     CT Angiogram Chest Pulmonary Embolism  Result Date: 5/22/2025  Impression: Acute pulmonary emboli in the right lower lobe segmental and subsegmental pulmonary arteries. No CT evidence of right heart strain. Critical Findings were verbally communicated with Dr. Jacobo Vigil MD on 5/22/2025 at 6:08 p.m. Electronically Signed: Fredi Vigil MD  5/22/2025 6:06 PM EDT  Workstation ID: [id number]    XR Ribs Right With PA Chest  Result Date: 5/22/2025  Impression: No displaced right rib fracture. No acute chest finding. Electronically Signed: Anne Marie Schwartz MD  5/22/2025 4:51 PM EDT  Workstation ID: [id number]    US Abdomen Limited  Result Date: 5/22/2025  Impression: Normal right upper quadrant ultrasound. Electronically Signed: Bull Dickerson MD  5/22/2025 9:18 AM EDT  Workstation ID: [id number]    CT Abdomen Pelvis Without Contrast  Result Date: 5/22/2025  No acute process demonstrated. No urolithiasis or obstructive uropathy Electronically Signed: Valdez Dowling  5/22/2025 7:56 AM EDT  Workstation ID: [id number]    Medications   sodium chloride 0.9 % flush 10 mL (has no administration in time range)   enoxaparin sodium (LOVENOX) syringe 105 mg (has no administration in time range)   HYDROmorphone (DILAUDID) injection 0.5 mg (0.5 mg Intravenous Given 5/22/25 1806)   ondansetron (ZOFRAN) injection 4 mg (4 mg Intravenous Given 5/22/25 1805)   iopamidol (ISOVUE-370) 76 % injection 100 mL (100 mL Intravenous Given 5/22/25 4354)                                         EKG my interpretation normal sinus rhythm rate of 74 left atrial enlargement left anterior fascicular block no acute ischemic changes no change from 5/17/2025 abnormal EKG              Medical Decision Making  Medical decision making.  Patient IV established.  Labs obtained by independent review lipase normal comprehensive metabolic profile unremarkable liver  lipase troponin was less than 6.  EKG my interpretation normal sinus rhythm rate of 74 left atrial large left anterior fascicular block no acute ischemic change no change from 5/17/2025 abnormal EKG.  Patient given IV and a monitor was placed showed a sinus rhythm on my independent review.  He was given Dilaudid 0.5 mg IV and Zofran 4 mg IV.  His records reviewed today from the [facility] he had a CT of his abdomen pelvis without contrast and ultrasound of his abdomen gallbladder all of which were unremarkable.  Patient underwent a CT scan of the chest PE protocol my independent review there is a PE in the right lung.  I do not see evidence of right heart strain do not see pneumonia pneumothorax or other acute findings radiology acute pulmonary emboli in the right lower lobe segmental and subsegmental pulmonary arteries.  No evidence of right heart strain.  Patient was made aware of the findings.  On repeat exam he is feeling better.  I do not see any evidence that suggest acute failure pneumonia pneumothorax acute ST elevation myocardial infarction aneurysm AAA acute cholecystitis appendicitis or sepsis based on my history and physical and clinical findings.  Patient was started on Lovenox 1 mg/kg subcutaneously I talked to [place] we discussed the case the patient be admitted for further care stable otherwise unremarkable ER course.    Problems Addressed:  Other acute pulmonary embolism without acute cor pulmonale: complicated acute illness or injury    Amount and/or Complexity of Data Reviewed  Labs: ordered. Decision-making details documented in ED Course.  Radiology: ordered and independent interpretation performed. Decision-making details documented in ED Course.  ECG/medicine tests: ordered and independent interpretation performed. Decision-making details documented in ED Course.    Risk  Parenteral controlled substances.  Decision regarding hospitalization.        Final diagnoses:   Other acute pulmonary  embolism without acute cor pulmonale       ED Disposition  ED Disposition       ED Disposition   Decision to Admit    Condition   --    Comment   Level of Care: Telemetry [5]   Admitting Physician: CARLOZ FERNÁNDEZ [[id number]]   Attending Physician: CARLOZ FERNÁNDEZ [[id number]]                 No follow-up provider specified.       Medication List      No changes were made to your prescriptions during this visit.            Ambrocio De Oliveira MD  05/22/25 6502

## 2025-05-23 ENCOUNTER — APPOINTMENT (OUTPATIENT)
Dept: CARDIOLOGY | Facility: HOSPITAL | Age: 45
DRG: 176 | End: 2025-05-23
Payer: COMMERCIAL

## 2025-05-23 PROBLEM — Z86.718 HISTORY OF DVT (DEEP VEIN THROMBOSIS): Status: ACTIVE | Noted: 2025-05-23

## 2025-05-23 PROBLEM — R74.8 ELEVATED LIPASE: Status: ACTIVE | Noted: 2025-05-23

## 2025-05-23 PROBLEM — K22.10 EROSIVE ESOPHAGITIS: Status: ACTIVE | Noted: 2025-05-23

## 2025-05-23 PROBLEM — G47.33 OSA ON CPAP: Status: ACTIVE | Noted: 2025-05-23

## 2025-05-23 LAB
AMYLASE SERPL-CCNC: 73 U/L (ref 28–100)
ANION GAP SERPL CALCULATED.3IONS-SCNC: 8.1 MMOL/L (ref 5–15)
AORTIC DIMENSIONLESS INDEX: 0.85 (DI)
AV MEAN PRESS GRAD SYS DOP V1V2: 4 MMHG
AV VMAX SYS DOP: 141 CM/SEC
BASOPHILS # BLD AUTO: 0.03 10*3/MM3 (ref 0–0.2)
BASOPHILS NFR BLD AUTO: 0.5 % (ref 0–1.5)
BH CV ECHO LEFT VENTRICLE GLOBAL LONGITUDINAL STRAIN: -20.4 %
BH CV ECHO MEAS - AO MAX PG: 8 MMHG
BH CV ECHO MEAS - AO V2 VTI: 28.5 CM
BH CV ECHO MEAS - AVA(I,D): 3.8 CM2
BH CV ECHO MEAS - EDV(CUBED): 79.5 ML
BH CV ECHO MEAS - EDV(MOD-SP4): 151 ML
BH CV ECHO MEAS - EF(MOD-SP4): 68 %
BH CV ECHO MEAS - ESV(CUBED): 29.8 ML
BH CV ECHO MEAS - ESV(MOD-SP4): 48.3 ML
BH CV ECHO MEAS - FS: 27.9 %
BH CV ECHO MEAS - IVS/LVPW: 0.91 CM
BH CV ECHO MEAS - IVSD: 1 CM
BH CV ECHO MEAS - LA DIMENSION: 3.4 CM
BH CV ECHO MEAS - LAT PEAK E' VEL: 16.9 CM/SEC
BH CV ECHO MEAS - LV DIASTOLIC VOL/BSA (35-75): 67.9 CM2
BH CV ECHO MEAS - LV MASS(C)D: 152.6 GRAMS
BH CV ECHO MEAS - LV MAX PG: 5.7 MMHG
BH CV ECHO MEAS - LV MEAN PG: 3 MMHG
BH CV ECHO MEAS - LV SYSTOLIC VOL/BSA (12-30): 21.7 CM2
BH CV ECHO MEAS - LV V1 MAX: 119 CM/SEC
BH CV ECHO MEAS - LV V1 VTI: 24.2 CM
BH CV ECHO MEAS - LVIDD: 4.3 CM
BH CV ECHO MEAS - LVIDS: 3.1 CM
BH CV ECHO MEAS - LVOT AREA: 4.5 CM2
BH CV ECHO MEAS - LVOT DIAM: 2.4 CM
BH CV ECHO MEAS - LVPWD: 1.1 CM
BH CV ECHO MEAS - MED PEAK E' VEL: 12.9 CM/SEC
BH CV ECHO MEAS - MV A MAX VEL: 64.4 CM/SEC
BH CV ECHO MEAS - MV DEC SLOPE: 504 CM/SEC2
BH CV ECHO MEAS - MV DEC TIME: 0.23 SEC
BH CV ECHO MEAS - MV E MAX VEL: 80 CM/SEC
BH CV ECHO MEAS - MV E/A: 1.24
BH CV ECHO MEAS - MV MAX PG: 3.2 MMHG
BH CV ECHO MEAS - MV MEAN PG: 2 MMHG
BH CV ECHO MEAS - MV P1/2T: 51.6 MSEC
BH CV ECHO MEAS - MV V2 VTI: 29.5 CM
BH CV ECHO MEAS - MVA(P1/2T): 4.3 CM2
BH CV ECHO MEAS - MVA(VTI): 3.7 CM2
BH CV ECHO MEAS - PA ACC TIME: 0.18 SEC
BH CV ECHO MEAS - PA V2 MAX: 78.7 CM/SEC
BH CV ECHO MEAS - PULM A REVS DUR: 0.13 SEC
BH CV ECHO MEAS - PULM A REVS VEL: 33.3 CM/SEC
BH CV ECHO MEAS - PULM DIAS VEL: 41.5 CM/SEC
BH CV ECHO MEAS - PULM S/D: 1.28
BH CV ECHO MEAS - PULM SYS VEL: 53 CM/SEC
BH CV ECHO MEAS - RV MAX PG: 1.93 MMHG
BH CV ECHO MEAS - RV V1 MAX: 69.5 CM/SEC
BH CV ECHO MEAS - RV V1 VTI: 16.5 CM
BH CV ECHO MEAS - RVDD: 3.8 CM
BH CV ECHO MEAS - SV(LVOT): 109.5 ML
BH CV ECHO MEAS - SV(MOD-SP4): 102.7 ML
BH CV ECHO MEAS - SVI(LVOT): 49.2 ML/M2
BH CV ECHO MEAS - SVI(MOD-SP4): 46.2 ML/M2
BH CV ECHO MEAS - TAPSE (>1.6): 2.9 CM
BH CV ECHO MEAS - TR MAX PG: 28.5 MMHG
BH CV ECHO MEAS - TR MAX VEL: 267 CM/SEC
BH CV ECHO MEAS RV FREE WALL STRAIN: -24.8 %
BH CV ECHO MEASUREMENTS AVERAGE E/E' RATIO: 5.37
BH CV LOWER VASCULAR LEFT COMMON FEMORAL AUGMENT: NORMAL
BH CV LOWER VASCULAR LEFT COMMON FEMORAL COMPETENT: NORMAL
BH CV LOWER VASCULAR LEFT COMMON FEMORAL COMPRESS: NORMAL
BH CV LOWER VASCULAR LEFT COMMON FEMORAL PHASIC: NORMAL
BH CV LOWER VASCULAR LEFT COMMON FEMORAL SPONT: NORMAL
BH CV LOWER VASCULAR LEFT DISTAL FEMORAL COMPRESS: NORMAL
BH CV LOWER VASCULAR LEFT GASTRONEMIUS COMPRESS: NORMAL
BH CV LOWER VASCULAR LEFT GREATER SAPH AK COMPRESS: NORMAL
BH CV LOWER VASCULAR LEFT GREATER SAPH BK COMPRESS: NORMAL
BH CV LOWER VASCULAR LEFT LESSER SAPH COMPRESS: NORMAL
BH CV LOWER VASCULAR LEFT MID FEMORAL AUGMENT: NORMAL
BH CV LOWER VASCULAR LEFT MID FEMORAL COMPETENT: NORMAL
BH CV LOWER VASCULAR LEFT MID FEMORAL COMPRESS: NORMAL
BH CV LOWER VASCULAR LEFT MID FEMORAL PHASIC: NORMAL
BH CV LOWER VASCULAR LEFT MID FEMORAL SPONT: NORMAL
BH CV LOWER VASCULAR LEFT PERONEAL COMPRESS: NORMAL
BH CV LOWER VASCULAR LEFT POPLITEAL AUGMENT: NORMAL
BH CV LOWER VASCULAR LEFT POPLITEAL COMPETENT: NORMAL
BH CV LOWER VASCULAR LEFT POPLITEAL COMPRESS: NORMAL
BH CV LOWER VASCULAR LEFT POPLITEAL PHASIC: NORMAL
BH CV LOWER VASCULAR LEFT POPLITEAL SPONT: NORMAL
BH CV LOWER VASCULAR LEFT POSTERIOR TIBIAL COMPRESS: NORMAL
BH CV LOWER VASCULAR LEFT PROXIMAL FEMORAL COMPRESS: NORMAL
BH CV LOWER VASCULAR LEFT SAPHENOFEMORAL JUNCTION COMPRESS: NORMAL
BH CV LOWER VASCULAR RIGHT COMMON FEMORAL AUGMENT: NORMAL
BH CV LOWER VASCULAR RIGHT COMMON FEMORAL COMPETENT: NORMAL
BH CV LOWER VASCULAR RIGHT COMMON FEMORAL COMPRESS: NORMAL
BH CV LOWER VASCULAR RIGHT COMMON FEMORAL PHASIC: NORMAL
BH CV LOWER VASCULAR RIGHT COMMON FEMORAL SPONT: NORMAL
BH CV LOWER VASCULAR RIGHT DISTAL FEMORAL COMPRESS: NORMAL
BH CV LOWER VASCULAR RIGHT GASTRONEMIUS COMPRESS: NORMAL
BH CV LOWER VASCULAR RIGHT GREATER SAPH AK COMPRESS: NORMAL
BH CV LOWER VASCULAR RIGHT GREATER SAPH BK COMPRESS: NORMAL
BH CV LOWER VASCULAR RIGHT LESSER SAPH COMPRESS: NORMAL
BH CV LOWER VASCULAR RIGHT MID FEMORAL AUGMENT: NORMAL
BH CV LOWER VASCULAR RIGHT MID FEMORAL COMPETENT: NORMAL
BH CV LOWER VASCULAR RIGHT MID FEMORAL COMPRESS: NORMAL
BH CV LOWER VASCULAR RIGHT MID FEMORAL PHASIC: NORMAL
BH CV LOWER VASCULAR RIGHT MID FEMORAL SPONT: NORMAL
BH CV LOWER VASCULAR RIGHT PERONEAL COMPRESS: NORMAL
BH CV LOWER VASCULAR RIGHT POPLITEAL AUGMENT: NORMAL
BH CV LOWER VASCULAR RIGHT POPLITEAL COMPETENT: NORMAL
BH CV LOWER VASCULAR RIGHT POPLITEAL COMPRESS: NORMAL
BH CV LOWER VASCULAR RIGHT POPLITEAL PHASIC: NORMAL
BH CV LOWER VASCULAR RIGHT POPLITEAL SPONT: NORMAL
BH CV LOWER VASCULAR RIGHT POSTERIOR TIBIAL COMPRESS: NORMAL
BH CV LOWER VASCULAR RIGHT PROXIMAL FEMORAL COMPRESS: NORMAL
BH CV LOWER VASCULAR RIGHT SAPHENOFEMORAL JUNCTION COMPRESS: NORMAL
BH CV XLRA - RV BASE: 4.6 CM
BH CV XLRA - RV LENGTH: 9.3 CM
BH CV XLRA - RV MID: 3.2 CM
BH CV XLRA - TDI S': 19.7 CM/SEC
BUN SERPL-MCNC: 15 MG/DL (ref 6–20)
BUN/CREAT SERPL: 14.3 (ref 7–25)
CALCIUM SPEC-SCNC: 9.5 MG/DL (ref 8.6–10.5)
CHLORIDE SERPL-SCNC: 103 MMOL/L (ref 98–107)
CO2 SERPL-SCNC: 26.9 MMOL/L (ref 22–29)
CREAT SERPL-MCNC: 1.05 MG/DL (ref 0.76–1.27)
DEPRECATED RDW RBC AUTO: 39.8 FL (ref 37–54)
EGFRCR SERPLBLD CKD-EPI 2021: 90.3 ML/MIN/1.73
EOSINOPHIL # BLD AUTO: 0.13 10*3/MM3 (ref 0–0.4)
EOSINOPHIL NFR BLD AUTO: 2.1 % (ref 0.3–6.2)
ERYTHROCYTE [DISTWIDTH] IN BLOOD BY AUTOMATED COUNT: 11.2 % (ref 12.3–15.4)
GLUCOSE SERPL-MCNC: 113 MG/DL (ref 65–99)
HCT VFR BLD AUTO: 45 % (ref 37.5–51)
HGB BLD-MCNC: 15.4 G/DL (ref 13–17.7)
IMM GRANULOCYTES # BLD AUTO: 0.02 10*3/MM3 (ref 0–0.05)
IMM GRANULOCYTES NFR BLD AUTO: 0.3 % (ref 0–0.5)
IVRT: 88 MS
LEFT ATRIUM VOLUME INDEX: 28.4 ML/M2
LIPASE SERPL-CCNC: 33 U/L (ref 13–60)
LV EF 3D SEGMENTATION: 69 %
LYMPHOCYTES # BLD AUTO: 1.79 10*3/MM3 (ref 0.7–3.1)
LYMPHOCYTES NFR BLD AUTO: 28.6 % (ref 19.6–45.3)
MCH RBC QN AUTO: 33 PG (ref 26.6–33)
MCHC RBC AUTO-ENTMCNC: 34.2 G/DL (ref 31.5–35.7)
MCV RBC AUTO: 96.4 FL (ref 79–97)
MONOCYTES # BLD AUTO: 0.77 10*3/MM3 (ref 0.1–0.9)
MONOCYTES NFR BLD AUTO: 12.3 % (ref 5–12)
NEUTROPHILS NFR BLD AUTO: 3.51 10*3/MM3 (ref 1.7–7)
NEUTROPHILS NFR BLD AUTO: 56.2 % (ref 42.7–76)
NRBC BLD AUTO-RTO: 0 /100 WBC (ref 0–0.2)
PLATELET # BLD AUTO: 203 10*3/MM3 (ref 140–450)
PMV BLD AUTO: 10 FL (ref 6–12)
POTASSIUM SERPL-SCNC: 4.1 MMOL/L (ref 3.5–5.2)
QT INTERVAL: 395 MS
QTC INTERVAL: 438 MS
RBC # BLD AUTO: 4.67 10*6/MM3 (ref 4.14–5.8)
SINUS: 3.6 CM
SODIUM SERPL-SCNC: 138 MMOL/L (ref 136–145)
STJ: 3.5 CM
WBC NRBC COR # BLD AUTO: 6.25 10*3/MM3 (ref 3.4–10.8)

## 2025-05-23 PROCEDURE — 83690 ASSAY OF LIPASE: CPT

## 2025-05-23 PROCEDURE — 85732 THROMBOPLASTIN TIME PARTIAL: CPT | Performed by: INTERNAL MEDICINE

## 2025-05-23 PROCEDURE — 93970 EXTREMITY STUDY: CPT | Performed by: SURGERY

## 2025-05-23 PROCEDURE — 93306 TTE W/DOPPLER COMPLETE: CPT | Performed by: INTERNAL MEDICINE

## 2025-05-23 PROCEDURE — 25010000002 ENOXAPARIN PER 10 MG

## 2025-05-23 PROCEDURE — 96376 TX/PRO/DX INJ SAME DRUG ADON: CPT

## 2025-05-23 PROCEDURE — 93970 EXTREMITY STUDY: CPT

## 2025-05-23 PROCEDURE — G0378 HOSPITAL OBSERVATION PER HR: HCPCS

## 2025-05-23 PROCEDURE — 85705 THROMBOPLASTIN INHIBITION: CPT | Performed by: INTERNAL MEDICINE

## 2025-05-23 PROCEDURE — 25010000002 HYDROMORPHONE PER 4 MG

## 2025-05-23 PROCEDURE — 86147 CARDIOLIPIN ANTIBODY EA IG: CPT | Performed by: INTERNAL MEDICINE

## 2025-05-23 PROCEDURE — 99221 1ST HOSP IP/OBS SF/LOW 40: CPT | Performed by: INTERNAL MEDICINE

## 2025-05-23 PROCEDURE — 82150 ASSAY OF AMYLASE: CPT

## 2025-05-23 PROCEDURE — 85613 RUSSELL VIPER VENOM DILUTED: CPT | Performed by: INTERNAL MEDICINE

## 2025-05-23 PROCEDURE — 80048 BASIC METABOLIC PNL TOTAL CA: CPT

## 2025-05-23 PROCEDURE — 85025 COMPLETE CBC W/AUTO DIFF WBC: CPT

## 2025-05-23 PROCEDURE — 96372 THER/PROPH/DIAG INJ SC/IM: CPT

## 2025-05-23 PROCEDURE — 93356 MYOCRD STRAIN IMG SPCKL TRCK: CPT

## 2025-05-23 PROCEDURE — 93306 TTE W/DOPPLER COMPLETE: CPT

## 2025-05-23 PROCEDURE — 86146 BETA-2 GLYCOPROTEIN ANTIBODY: CPT | Performed by: INTERNAL MEDICINE

## 2025-05-23 PROCEDURE — 93356 MYOCRD STRAIN IMG SPCKL TRCK: CPT | Performed by: INTERNAL MEDICINE

## 2025-05-23 PROCEDURE — 85670 THROMBIN TIME PLASMA: CPT | Performed by: INTERNAL MEDICINE

## 2025-05-23 RX ADMIN — ENOXAPARIN SODIUM 105 MG: 150 INJECTION SUBCUTANEOUS at 08:44

## 2025-05-23 RX ADMIN — Medication 10 ML: at 09:28

## 2025-05-23 RX ADMIN — Medication 10 ML: at 22:26

## 2025-05-23 RX ADMIN — SUCRALFATE 1 G: 1 TABLET ORAL at 08:44

## 2025-05-23 RX ADMIN — SUCRALFATE 1 G: 1 TABLET ORAL at 17:15

## 2025-05-23 RX ADMIN — SUCRALFATE 1 G: 1 TABLET ORAL at 22:23

## 2025-05-23 RX ADMIN — ENOXAPARIN SODIUM 105 MG: 150 INJECTION SUBCUTANEOUS at 22:22

## 2025-05-23 RX ADMIN — HYDROMORPHONE HYDROCHLORIDE 0.5 MG: 1 INJECTION, SOLUTION INTRAMUSCULAR; INTRAVENOUS; SUBCUTANEOUS at 16:07

## 2025-05-23 RX ADMIN — OXYCODONE 5 MG: 5 TABLET ORAL at 06:50

## 2025-05-23 RX ADMIN — SUCRALFATE 1 G: 1 TABLET ORAL at 11:08

## 2025-05-23 RX ADMIN — HYDROMORPHONE HYDROCHLORIDE 0.5 MG: 1 INJECTION, SOLUTION INTRAMUSCULAR; INTRAVENOUS; SUBCUTANEOUS at 11:08

## 2025-05-23 NOTE — CONSULTS
Hematology/Oncology Inpatient Consultation    Patient name: Jass Odell  : 1980  MRN: [id number]  Referring Provider: Gillian VILLATORO  Reason for Consultation: Pulmonary embolism     Chief complaint: Pulmonary embolism     History of present illness:    Jass Odell is a 45 y.o. male with a past medical history of GERD, left lower extremity DVT, obstructive sleep apnea on CPAP who presented to [facility] on 2025 with complaints of right-sided abdominal pain.  CT abdomen and pelvis per radiology showed no acute process.  Right upper quadrant ultrasound was also normal.  CT angiogram of the chest showed acute pulmonary emboli in the right lower lobe segmental and subsegmental pulmonary arteries no CT evidence of right heart strain.  Bilateral venous duplex Dopplers were negative.  Initially started on treatment Lovenox.  Review of records shows he was hospitalized 2025 to 2025 for epigastric pain and sinus bradycardia.  He was seen by GI who started him on Voquenza cardiology was consulted for sinus bradycardia and no further workup was advised at that time.  Records shows that in 2017 he had a thrombus involving the entire great saphenous vein below.  Left thrombus was extending from the greater saphenous common femoral vein confluence with a small portion of thrombus within the common femoral vein without complete occlusion consistent with deep extension of thrombus.  He was started on Eliquis for 3 months minimum.  He saw Dr. Grubbs at [place] oncology hypercoagulability workup.  It did not appear that he followed up.  Further review of records shows that in 2024 he had a CTA which was questionable for very small emboli within the subsegmental branches in the right lower lobe but possibly could be artifact.  Bilateral lower extremity venous Dopplers were negative at that time.  He reports his grandmother had blood clots.    25  Hematology/Oncology was  consulted for recurrent pulmonary embolism unprovoked.  He was initially started on therapeutic dose of Lovenox.  And has been transitioned to oral anti-coagulation by primary team .  Hypercoagulability studies have been ordered.  Records shows he saw Dr. Grubbs oncology at [place] in February 2017.  At that time, prothrombin gene mutation was negative, cardiolipin IgM was 12.  IgG was 4, lupus anticoagulant was negative, beta-2 glycoprotein IgG AB was 0 beta-2 glycoprotein IgM AB was 3, factor V Leiden was negative    He/She  has a past medical history of Anxiety and GERD (gastroesophageal reflux disease).    PCP: Luan Gomes MD    History:  Past Medical History:   Diagnosis Date    Anxiety     GERD (gastroesophageal reflux disease)    ,   Past Surgical History:   Procedure Laterality Date    ENDOSCOPY N/A 8/12/2024    Procedure: ESOPHAGOGASTRODUODENOSCOPY;  Surgeon: Oseas Pfeiffer MD;  Location: [facility] ENDOSCOPY;  Service: Gastroenterology;  Laterality: N/A;  POST-EROSIVE ESOPHAGITIS   , History reviewed. No pertinent family history.,   Social History     Tobacco Use    Smoking status: Former     Types: Cigars    Smokeless tobacco: Former     Types: Chew   Vaping Use    Vaping status: Never Used   Substance Use Topics    Alcohol use: Not Currently    Drug use: Not Currently     Types: Marijuana     Comment: occ   ,   Medications Prior to Admission   Medication Sig Dispense Refill Last Dose/Taking    ondansetron ODT (ZOFRAN-ODT) 4 MG disintegrating tablet Place 1 tablet on the tongue Every 8 (Eight) Hours As Needed for Nausea or Vomiting. 15 tablet 0 5/21/2025    sucralfate (CARAFATE) 1 g tablet Take 1 tablet by mouth 4 (Four) Times a Day.   5/22/2025 at 12:00 PM    Vonoprazan Fumarate (Voquezna) 20 MG tablet Take 1 tablet by mouth Daily.   5/22/2025    apixaban (ELIQUIS) 5 MG tablet tablet Take 2 tablets by mouth 2 (Two) Times a Day for 7 days, THEN 1 tablet 2 (Two) Times a Day for 21 days. 70 tablet 0  "   , Scheduled Meds:  enoxaparin sodium, 1 mg/kg, Subcutaneous, Q12H  sodium chloride, 10 mL, Intravenous, Q12H  sucralfate, 1 g, Oral, 4x Daily  Vonoprazan Fumarate, 20 mg, Oral, Daily    , Continuous Infusions:  Pharmacy to Dose enoxaparin (LOVENOX),     , PRN Meds:    acetaminophen    senna-docusate sodium **AND** polyethylene glycol **AND** bisacodyl **AND** bisacodyl    HYDROmorphone    nitroglycerin    ondansetron ODT **OR** ondansetron    oxyCODONE    Pharmacy to Dose enoxaparin (LOVENOX)    [COMPLETED] Insert Peripheral IV **AND** sodium chloride    sodium chloride    sodium chloride   Allergies:  Prednisone    Subjective     ROS:  Review of Systems     Objective   Vital Signs:   /70 (BP Location: Left arm, Patient Position: Lying)   Pulse 59   Temp 98.4 °F (36.9 °C) (Oral)   Resp 19   Ht 177.8 cm (70\")   Wt 106 kg (232 lb 12.9 oz)   SpO2 96%   BMI 33.40 kg/m²     Physical Exam: (performed by MD)  Physical Exam    Results Review:  Lab Results (last 48 hours)       Procedure Component Value Units Date/Time    Factor II, DNA Analysis [[id number]] Collected: 05/23/25 0928    Specimen: Blood from Arm, Right Updated: 05/23/25 0938    Factor 5 Leiden [[id number]] Collected: 05/23/25 0928    Specimen: Blood from Arm, Right Updated: 05/23/25 0938    Cardiolipin Antibody [[id number]] Collected: 05/23/25 0928    Specimen: Blood from Arm, Right Updated: 05/23/25 0938    Beta-2 Glycoprotein Antibodies [[id number]] Collected: 05/23/25 0928    Specimen: Blood from Arm, Right Updated: 05/23/25 0938    Lupus Anticoagulant [[id number]] Collected: 05/23/25 0928    Specimen: Blood from Arm, Right Updated: 05/23/25 0938    Basic Metabolic Panel [[id number]]  (Abnormal) Collected: 05/23/25 5403    Specimen: Blood from Arm, Right Updated: 05/23/25 0506     Glucose 113 mg/dL      BUN 15 mg/dL      Creatinine 1.05 mg/dL      Sodium 138 mmol/L      Potassium 4.1 mmol/L      Comment: Specimen hemolyzed.  Result may be falsely " elevated.        Chloride 103 mmol/L      CO2 26.9 mmol/L      Calcium 9.5 mg/dL      BUN/Creatinine Ratio 14.3     Anion Gap 8.1 mmol/L      eGFR 90.3 mL/min/1.73     Narrative:      GFR Categories in Chronic Kidney Disease (CKD)              GFR Category          GFR (mL/min/1.73)    Interpretation  G1                    90 or greater        Normal or high (1)  G2                    60-89                Mild decrease (1)  G3a                   45-59                Mild to moderate decrease  G3b                   30-44                Moderate to severe decrease  G4                    15-29                Severe decrease  G5                    14 or less           Kidney failure    (1)In the absence of evidence of kidney disease, neither GFR category G1 or G2 fulfill the criteria for CKD.    eGFR calculation 2021 CKD-EPI creatinine equation, which does not include race as a factor    Lipase [[id number]]  (Normal) Collected: 05/23/25 0423    Specimen: Blood from Arm, Right Updated: 05/23/25 0500     Lipase 33 U/L     Amylase [[id number]]  (Normal) Collected: 05/23/25 0423    Specimen: Blood from Arm, Right Updated: 05/23/25 0500     Amylase 73 U/L     CBC & Differential [[id number]]  (Abnormal) Collected: 05/23/25 0423    Specimen: Blood from Arm, Right Updated: 05/23/25 0434    Narrative:      The following orders were created for panel order CBC & Differential.  Procedure                               Abnormality         Status                     ---------                               -----------         ------                     CBC Auto Differential[[id number]]        Abnormal            Final result                 Please view results for these tests on the individual orders.    CBC Auto Differential [[id number]]  (Abnormal) Collected: 05/23/25 0423    Specimen: Blood from Arm, Right Updated: 05/23/25 0434     WBC 6.25 10*3/mm3      RBC 4.67 10*6/mm3      Hemoglobin 15.4 g/dL      Hematocrit 45.0 %      MCV 96.4  fL      MCH 33.0 pg      MCHC 34.2 g/dL      RDW 11.2 %      RDW-SD 39.8 fl      MPV 10.0 fL      Platelets 203 10*3/mm3      Neutrophil % 56.2 %      Lymphocyte % 28.6 %      Monocyte % 12.3 %      Eosinophil % 2.1 %      Basophil % 0.5 %      Immature Grans % 0.3 %      Neutrophils, Absolute 3.51 10*3/mm3      Lymphocytes, Absolute 1.79 10*3/mm3      Monocytes, Absolute 0.77 10*3/mm3      Eosinophils, Absolute 0.13 10*3/mm3      Basophils, Absolute 0.03 10*3/mm3      Immature Grans, Absolute 0.02 10*3/mm3      nRBC 0.0 /100 WBC     Sedimentation Rate [[id number]]  (Normal) Collected: 05/22/25 1642    Specimen: Blood Updated: 05/22/25 1835     Sed Rate 9 mm/hr     High Sensitivity Troponin T 1Hr [[id number]] Collected: 05/22/25 1805    Specimen: Blood from Arm, Right Updated: 05/22/25 1833     HS Troponin T <6 ng/L      Troponin T Numeric Delta --     Comment: Unable to calculate.       Narrative:      High Sensitive Troponin T Reference Range:  <14.0 ng/L- Negative Female for AMI  <22.0 ng/L- Negative Male for AMI  >=14 - Abnormal Female indicating possible myocardial injury.  >=22 - Abnormal Male indicating possible myocardial injury.   Clinicians would have to utilize clinical acumen, EKG, Troponin, and serial changes to determine if it is an Acute Myocardial Infarction or myocardial injury due to an underlying chronic condition.         CBC & Differential [[id number]]  (Abnormal) Collected: 05/22/25 1642    Specimen: Blood Updated: 05/22/25 1828    Narrative:      The following orders were created for panel order CBC & Differential.  Procedure                               Abnormality         Status                     ---------                               -----------         ------                     CBC Auto Differential[[id number]]        Abnormal            Final result                 Please view results for these tests on the individual orders.    CBC Auto Differential [[id number]]  (Abnormal) Collected:  05/22/25 1642    Specimen: Blood Updated: 05/22/25 1828     WBC 12.31 10*3/mm3      RBC 5.05 10*6/mm3      Hemoglobin 16.6 g/dL      Hematocrit 48.7 %      MCV 96.4 fL      MCH 32.9 pg      MCHC 34.1 g/dL      RDW 10.8 %      RDW-SD 38.8 fl      MPV 10.5 fL      Platelets 223 10*3/mm3      Neutrophil % 68.6 %      Lymphocyte % 20.9 %      Monocyte % 9.4 %      Eosinophil % 0.6 %      Basophil % 0.2 %      Immature Grans % 0.3 %      Neutrophils, Absolute 8.44 10*3/mm3      Lymphocytes, Absolute 2.57 10*3/mm3      Monocytes, Absolute 1.16 10*3/mm3      Eosinophils, Absolute 0.07 10*3/mm3      Basophils, Absolute 0.03 10*3/mm3      Immature Grans, Absolute 0.04 10*3/mm3      nRBC 0.0 /100 WBC     Comprehensive Metabolic Panel [[id number]]  (Abnormal) Collected: 05/22/25 1642    Specimen: Blood Updated: 05/22/25 1753     Glucose 98 mg/dL      BUN 15 mg/dL      Creatinine 1.10 mg/dL      Sodium 138 mmol/L      Potassium 4.1 mmol/L      Chloride 99 mmol/L      CO2 21.8 mmol/L      Calcium 10.0 mg/dL      Total Protein 7.5 g/dL      Albumin 4.8 g/dL      ALT (SGPT) 21 U/L      AST (SGOT) 26 U/L      Alkaline Phosphatase 51 U/L      Total Bilirubin 1.9 mg/dL      Globulin 2.7 gm/dL      A/G Ratio 1.8 g/dL      BUN/Creatinine Ratio 13.6     Anion Gap 17.2 mmol/L      eGFR 85.4 mL/min/1.73     Narrative:      GFR Categories in Chronic Kidney Disease (CKD)              GFR Category          GFR (mL/min/1.73)    Interpretation  G1                    90 or greater        Normal or high (1)  G2                    60-89                Mild decrease (1)  G3a                   45-59                Mild to moderate decrease  G3b                   30-44                Moderate to severe decrease  G4                    15-29                Severe decrease  G5                    14 or less           Kidney failure    (1)In the absence of evidence of kidney disease, neither GFR category G1 or G2 fulfill the criteria for CKD.    eGFR  calculation 2021 CKD-EPI creatinine equation, which does not include race as a factor    High Sensitivity Troponin T [[id number]]  (Normal) Collected: 05/22/25 1642    Specimen: Blood Updated: 05/22/25 1753     HS Troponin T <6 ng/L     Narrative:      High Sensitive Troponin T Reference Range:  <14.0 ng/L- Negative Female for AMI  <22.0 ng/L- Negative Male for AMI  >=14 - Abnormal Female indicating possible myocardial injury.  >=22 - Abnormal Male indicating possible myocardial injury.   Clinicians would have to utilize clinical acumen, EKG, Troponin, and serial changes to determine if it is an Acute Myocardial Infarction or myocardial injury due to an underlying chronic condition.         Lipase [[id number]]  (Abnormal) Collected: 05/22/25 1642    Specimen: Blood Updated: 05/22/25 1714     Lipase 62 U/L     Extra Tubes [[id number]] Collected: 05/22/25 1642    Specimen: Blood Updated: 05/22/25 1700    Narrative:      The following orders were created for panel order Extra Tubes.  Procedure                               Abnormality         Status                     ---------                               -----------         ------                     Lavender Top[[id number]]                                     Final result               Gold Top - SST[[id number]]                                   Final result               Light Blue Top[[id number]]                                   Final result                 Please view results for these tests on the individual orders.    Lavender Top [[id number]] Collected: 05/22/25 1642    Specimen: Blood Updated: 05/22/25 1700     Extra Tube hold for add-on     Comment: Auto resulted       Gold Top - SST [[id number]] Collected: 05/22/25 1642    Specimen: Blood Updated: 05/22/25 1700     Extra Tube Hold for add-ons.     Comment: Auto resulted.       Light Blue Top [[id number]] Collected: 05/22/25 1642    Specimen: Blood Updated: 05/22/25 1700     Extra Tube Hold for add-ons.      Comment: Auto resulted                Pending Results:     Imaging Reviewed:   CT Angiogram Chest Pulmonary Embolism  Result Date: 5/22/2025  Impression: Acute pulmonary emboli in the right lower lobe segmental and subsegmental pulmonary arteries. No CT evidence of right heart strain. Critical Findings were verbally communicated with Dr. Jacobo Vigil MD on 5/22/2025 at 6:08 p.m. Electronically Signed: Fredi Vigil MD  5/22/2025 6:06 PM EDT  Workstation ID: [id number]    XR Ribs Right With PA Chest  Result Date: 5/22/2025  Impression: No displaced right rib fracture. No acute chest finding. Electronically Signed: Anne Marie Schwartz MD  5/22/2025 4:51 PM EDT  Workstation ID: [id number]    US Abdomen Limited  Result Date: 5/22/2025  Impression: Normal right upper quadrant ultrasound. Electronically Signed: Bull Dickerson MD  5/22/2025 9:18 AM EDT  Workstation ID: [id number]    CT Abdomen Pelvis Without Contrast  Result Date: 5/22/2025  No acute process demonstrated. No urolithiasis or obstructive uropathy Electronically Signed: Valdez Dowling  5/22/2025 7:56 AM EDT  Workstation ID: [id number]    XR Chest 1 View  Result Date: 5/17/2025  Impression: No active cardiopulmonary disease Electronically Signed: Florin Lopez DO  5/17/2025 5:56 PM EDT  Workstation ID: [id number]           Assessment & Plan   ASSESSMENT  Recurrent unprovoked pulmonary embolisms, previously on Eliquis.    PLAN  Discharge on oral anticoagulation, hypercoagulability  studies have been ordered and are pending.  He will follow-up in our office postdischarge.     Electronically signed by SHAUNA Faith, 05/23/25, 1:47 PM EDT.    Above note was prepared for Dr. Ciro Murdock -physical exam and review of systems were performed by physician. Further evaluation and treatment per Dr Murdock.    5/23/2025: I was asked to see Mr. Odell who came in complaining of pain in the right lower chest.  CT of the abdomen pelvis revealed no abnormalities.   Subsequent to this he had an angiogram of the chest and it reported acute pulmonary emboli in the region of the right lower lobe segmental and subsegmental pulmonary arteries.  There was no evidence of right heart strain.  He also had a duplex scan of both lower extremities that revealed no evidence of deep vein thrombosis.  He was started on anticoagulation.  He gave a history of a deep vein thrombus that was diagnosed in 2017.  He woke up 1 morning with pain in the left lower extremity.  In the next 1 or 2 days there was progressive edema and he sought attention from an urgent care facility where he was treated with antibiotics with the suspicion of cellulitis.  He sought attention from his primary care physician who did not obtained a Doppler.  This confirmed a thrombus involving the entirety of the saphenous vein extending to the saphenofemoral junction and into the proximal femoral vein.  He was placed on apixaban and took the medication for several months.  He could not be any more specific and I did not find any additional information on the chart.  At the time he underwent extensive testing for an inherited predisposition to thrombosis, including protein C and protein S activity as well as factor V Leiden and prothrombin gene mutation.  All were negative.  He also was tested for lupus anticoagulant, beta-2 glycoprotein antibodies and cardiolipin antibodies which were as well negative.  Approximately 72 hours before his symptoms started this last time he was admitted to the hospital with upper gastrointestinal symptoms.  He stayed in the hospital for approximately 48 hours.  On exam he is a well-built man who does not seem in distress.  He is conversant and oriented.  There is no jaundice.  The lungs are diminished bilaterally and the heart regular.  The abdomen is protuberant and soft.  The liver and spleen are nonenlarged.  There is no edema of the lower extremities.  It is very possible that the recent  admission to the hospital is the provocation for the present pulmonary embolism.  However, it is a recurring process that started at a young age which, despite the negative results on the above testing confirms that Mr. Odell has an increased propensity to thrombosis.  On this basis I believe that he should be treated with lifelong anticoagulation and apixaban, if it is a medication that his insurance covers, would be appropriate.  I do not believe that much more testing is necessary with the exception of repeating lupus anticoagulant, anticardiolipin antibodies and antibeta-2 lipoprotein antibody.  This because, if positive, it would be necessary to treat him with warfarin.  Discussed at great length with him and with his spouse and answered questions.  I discussed and reviewed the record with Ms.Essing Donell VILLATORO and concur with her note. I formulated the analysis and the plans.     Ciro Murdock MD on 5/23/2025 at 17:06

## 2025-05-23 NOTE — PLAN OF CARE
Goal Outcome Evaluation:   Pt A/Ox4 with VSS on room air. PRN pain medications given as ordered per pt request, interventions effective. Pt went off unit for doppler during this shift. Return to work note given to pt with dates per Dr. Grewal. Spouse at bedside majority of shift. Pt able to voice concerns, call light within reach.

## 2025-05-23 NOTE — PHARMACY PATIENT ASSISTANCE
Transitions of Care (test claim):    Drug Apixaban:  10 mg PO BID x 7 days, then 5 mg BID   Covered/PA Required: Covered without PA  Copay Per Month: $25.00  Is the medication eligible for a trial card/copay card? Free trial available from         Please note that when it states medication is eligible for a trial card that this only means that the medication has a free trial available. This does not assess if the patient has already utilized the once in a lifetime free trial.     This test claim coverage is only valid on the date the note is published. Copay/coverage could vary depending on patient coverage at a later date.  Additionally this test claim is for the sole purpose of a price check not a clinical recommendation.     For billing questions please call [facility] at ext: [phone]  For M2B questions please call [facility] at ext: [phone]      Serjio Lerner, Hannah, BCPS

## 2025-05-23 NOTE — CASE MANAGEMENT/SOCIAL WORK
Discharge Planning Assessment   [place]     Patient Name: Jass Odell  MRN: [id number]  Today's Date: 5/23/2025    Admit Date: 5/22/2025    Plan: Anticipate routine home with spouse. Eliquis copay $25/month.   Discharge Needs Assessment       Row Name 05/23/25 1019       Living Environment    People in Home spouse    Current Living Arrangements home    Potentially Unsafe Housing Conditions none    In the past 12 months has the electric, gas, oil, or water company threatened to shut off services in your home? No    Primary Care Provided by self    Provides Primary Care For no one    Family Caregiver if Needed spouse    Quality of Family Relationships helpful    Able to Return to Prior Arrangements yes       Resource/Environmental Concerns    Resource/Environmental Concerns none    Transportation Concerns none       Transportation Needs    In the past 12 months, has lack of transportation kept you from medical appointments or from getting medications? no    In the past 12 months, has lack of transportation kept you from meetings, work, or from getting things needed for daily living? No       Food Insecurity    Within the past 12 months, you worried that your food would run out before you got the money to buy more. Never true    Within the past 12 months, the food you bought just didn't last and you didn't have money to get more. Never true       Transition Planning    Patient/Family Anticipates Transition to home with family    Patient/Family Anticipated Services at Transition none    Transportation Anticipated family or friend will provide       Discharge Needs Assessment    Readmission Within the Last 30 Days no previous admission in last 30 days    Equipment Currently Used at Home cpap    Concerns to be Addressed denies needs/concerns at this time    Anticipated Changes Related to Illness none    Equipment Needed After Discharge none                   Discharge Plan       Row Name 05/23/25 1020       Plan    Plan  Anticipate routine home with spouse. Eliquis copay $25/month.    Patient/Family in Agreement with Plan yes    Plan Comments CM met with patient at bedside. Patient lives with spouse, is independent with ADLs and drives. PCP ([facility]) and pharmacy ([facility]) verified-denies any difficulty affording meds. Eliquis copay $25/month, qualifies for $10 copay card per [facility], patient agreeable. Patient denies any dc needs at this time and family will transport at dc. Barrier to D/C: hematology consult, Lovenox.                    Expected Discharge Date and Time       Expected Discharge Date Expected Discharge Time    May 24, 2025            Demographic Summary       Row Name 05/23/25 1019       General Information    Admission Type inpatient    Arrived From emergency department    Referral Source admission list    Reason for Consult discharge planning    Preferred Language English       Contact Information    Permission Granted to Share Info With                    Functional Status       Row Name 05/23/25 1019       Functional Status    Usual Activity Tolerance good    Current Activity Tolerance good       Functional Status, IADL    Medications independent    Meal Preparation independent    Housekeeping independent    Laundry independent    Shopping independent       Mental Status    General Appearance WDL WDL       Mental Status Summary    Recent Changes in Mental Status/Cognitive Functioning no changes                      Met with patient in room.  Maintained distance greater than six feet and spent less than 15 minutes in the room.       JASMIN PonceN, [place] [id number]    Office: [phone]  Fax: [phone]

## 2025-05-23 NOTE — PHARMACY PATIENT ASSISTANCE
Transitions of Care (test claim):    Drug Eliquis:   Covered/PA Required: Covered without PA  Copay Per Month: $25  Is the medication eligible for a trial card/copay card? Copay card available from  and Free trial card    Please note that when it states medication is eligible for a trial card that this only means that the medication has a free trial available. This does not assess if the patient has already utilized the once in a lifetime free trial.     This test claim coverage is only valid on the date the note is published. Copay/coverage could vary depending on patient coverage at a later date.  Additionally this test claim is for the sole purpose of a price check not a clinical recommendation.     For billing questions please call [facility] Pharmac[facility] at ext: [phone]  For M2B questions please call [facility] at ext: [phone]    Leonor Solis PharmD

## 2025-05-23 NOTE — PROGRESS NOTES
LOS: 1 day   Patient Care Team:  Luan Gomes MD as PCP - General (Family Medicine)    Subjective     Interval History: Stable overnight    Patient Complaints: Right-sided chest pain is persistent but less intense.  No shortness of breath.    History taken from: patient    Review of Systems   Constitutional:  Positive for activity change, appetite change and fatigue. Negative for chills.   HENT:  Negative for congestion and facial swelling.    Eyes:  Negative for visual disturbance.   Respiratory:  Positive for shortness of breath. Negative for cough, wheezing and stridor.    Cardiovascular:  Positive for chest pain. Negative for palpitations and leg swelling.   Gastrointestinal:  Negative for abdominal pain, constipation, nausea and vomiting.   Genitourinary:  Positive for flank pain. Negative for dysuria.   Musculoskeletal:  Negative for arthralgias and back pain.   Skin:  Negative for rash and wound.   Neurological:  Negative for tremors, syncope, weakness, light-headedness and numbness.   Psychiatric/Behavioral:  Negative for confusion.            Objective     Vital Signs  Temp:  [97.8 °F (36.6 °C)-98.6 °F (37 °C)] 98.4 °F (36.9 °C)  Heart Rate:  [59-99] 59  Resp:  [11-22] 19  BP: (113-153)/(63-91) 118/70    Physical Exam:     General Appearance:    Alert, cooperative, in no acute distress,   Head:    Normocephalic, without obvious abnormality, atraumatic   Eyes:            Lids and lashes normal, conjunctivae and sclerae normal, no   icterus, no pallor, corneas clear, PERRLA   Ears:    Ears appear intact with no abnormalities noted   Throat:   No oral lesions, no thrush, oral mucosa moist   Neck:   No adenopathy, supple, trachea midline, no thyromegaly, no   carotid bruit, no JVD   Lungs:     Clear to auscultation,respirations regular, even and                  unlabored    Heart:    Regular rhythm and normal rate, normal S1 and S2, no            murmur, no gallop, no rub, no click   Chest Wall:    No  abnormalities observed   Abdomen:     Normal bowel sounds, no masses, no organomegaly, soft        Non-tender non-distended, no guarding,   Extremities:   Moves all extremities well, no edema, no cyanosis, no             Redness   Pulses:   Pulses palpable and equal bilaterally   Skin:   No bleeding, bruising or rash   Lymph nodes:   No palpable adenopathy   Neurologic:   Cranial nerves 2 - 12 grossly intact, sensation intact, DTR       present and equal bilaterally        Results Review:    Lab Results (last 24 hours)       Procedure Component Value Units Date/Time    Factor II, DNA Analysis [[id number]] Collected: 05/23/25 0928    Specimen: Blood from Arm, Right Updated: 05/23/25 0938    Factor 5 Leiden [[id number]] Collected: 05/23/25 0928    Specimen: Blood from Arm, Right Updated: 05/23/25 0938    Cardiolipin Antibody [[id number]] Collected: 05/23/25 0928    Specimen: Blood from Arm, Right Updated: 05/23/25 0938    Beta-2 Glycoprotein Antibodies [[id number]] Collected: 05/23/25 0928    Specimen: Blood from Arm, Right Updated: 05/23/25 0938    Lupus Anticoagulant [[id number]] Collected: 05/23/25 0928    Specimen: Blood from Arm, Right Updated: 05/23/25 0938    Basic Metabolic Panel [[id number]]  (Abnormal) Collected: 05/23/25 0423    Specimen: Blood from Arm, Right Updated: 05/23/25 0506     Glucose 113 mg/dL      BUN 15 mg/dL      Creatinine 1.05 mg/dL      Sodium 138 mmol/L      Potassium 4.1 mmol/L      Comment: Specimen hemolyzed.  Result may be falsely elevated.        Chloride 103 mmol/L      CO2 26.9 mmol/L      Calcium 9.5 mg/dL      BUN/Creatinine Ratio 14.3     Anion Gap 8.1 mmol/L      eGFR 90.3 mL/min/1.73     Narrative:      GFR Categories in Chronic Kidney Disease (CKD)              GFR Category          GFR (mL/min/1.73)    Interpretation  G1                    90 or greater        Normal or high (1)  G2                    60-89                Mild decrease (1)  G3a                   45-59                 Mild to moderate decrease  G3b                   30-44                Moderate to severe decrease  G4                    15-29                Severe decrease  G5                    14 or less           Kidney failure    (1)In the absence of evidence of kidney disease, neither GFR category G1 or G2 fulfill the criteria for CKD.    eGFR calculation 2021 CKD-EPI creatinine equation, which does not include race as a factor    Lipase [[id number]]  (Normal) Collected: 05/23/25 0423    Specimen: Blood from Arm, Right Updated: 05/23/25 0500     Lipase 33 U/L     Amylase [[id number]]  (Normal) Collected: 05/23/25 0423    Specimen: Blood from Arm, Right Updated: 05/23/25 0500     Amylase 73 U/L     CBC & Differential [[id number]]  (Abnormal) Collected: 05/23/25 0423    Specimen: Blood from Arm, Right Updated: 05/23/25 0434    Narrative:      The following orders were created for panel order CBC & Differential.  Procedure                               Abnormality         Status                     ---------                               -----------         ------                     CBC Auto Differential[[id number]]        Abnormal            Final result                 Please view results for these tests on the individual orders.    CBC Auto Differential [[id number]]  (Abnormal) Collected: 05/23/25 0423    Specimen: Blood from Arm, Right Updated: 05/23/25 0434     WBC 6.25 10*3/mm3      RBC 4.67 10*6/mm3      Hemoglobin 15.4 g/dL      Hematocrit 45.0 %      MCV 96.4 fL      MCH 33.0 pg      MCHC 34.2 g/dL      RDW 11.2 %      RDW-SD 39.8 fl      MPV 10.0 fL      Platelets 203 10*3/mm3      Neutrophil % 56.2 %      Lymphocyte % 28.6 %      Monocyte % 12.3 %      Eosinophil % 2.1 %      Basophil % 0.5 %      Immature Grans % 0.3 %      Neutrophils, Absolute 3.51 10*3/mm3      Lymphocytes, Absolute 1.79 10*3/mm3      Monocytes, Absolute 0.77 10*3/mm3      Eosinophils, Absolute 0.13 10*3/mm3      Basophils, Absolute 0.03 10*3/mm3       Immature Grans, Absolute 0.02 10*3/mm3      nRBC 0.0 /100 WBC     Sedimentation Rate [[id number]]  (Normal) Collected: 05/22/25 1642    Specimen: Blood Updated: 05/22/25 1835     Sed Rate 9 mm/hr     High Sensitivity Troponin T 1Hr [[id number]] Collected: 05/22/25 1805    Specimen: Blood from Arm, Right Updated: 05/22/25 1833     HS Troponin T <6 ng/L      Troponin T Numeric Delta --     Comment: Unable to calculate.       Narrative:      High Sensitive Troponin T Reference Range:  <14.0 ng/L- Negative Female for AMI  <22.0 ng/L- Negative Male for AMI  >=14 - Abnormal Female indicating possible myocardial injury.  >=22 - Abnormal Male indicating possible myocardial injury.   Clinicians would have to utilize clinical acumen, EKG, Troponin, and serial changes to determine if it is an Acute Myocardial Infarction or myocardial injury due to an underlying chronic condition.         CBC & Differential [[id number]]  (Abnormal) Collected: 05/22/25 1642    Specimen: Blood Updated: 05/22/25 1828    Narrative:      The following orders were created for panel order CBC & Differential.  Procedure                               Abnormality         Status                     ---------                               -----------         ------                     CBC Auto Differential[[id number]]        Abnormal            Final result                 Please view results for these tests on the individual orders.    CBC Auto Differential [[id number]]  (Abnormal) Collected: 05/22/25 1642    Specimen: Blood Updated: 05/22/25 1828     WBC 12.31 10*3/mm3      RBC 5.05 10*6/mm3      Hemoglobin 16.6 g/dL      Hematocrit 48.7 %      MCV 96.4 fL      MCH 32.9 pg      MCHC 34.1 g/dL      RDW 10.8 %      RDW-SD 38.8 fl      MPV 10.5 fL      Platelets 223 10*3/mm3      Neutrophil % 68.6 %      Lymphocyte % 20.9 %      Monocyte % 9.4 %      Eosinophil % 0.6 %      Basophil % 0.2 %      Immature Grans % 0.3 %      Neutrophils, Absolute 8.44 10*3/mm3       Lymphocytes, Absolute 2.57 10*3/mm3      Monocytes, Absolute 1.16 10*3/mm3      Eosinophils, Absolute 0.07 10*3/mm3      Basophils, Absolute 0.03 10*3/mm3      Immature Grans, Absolute 0.04 10*3/mm3      nRBC 0.0 /100 WBC     Comprehensive Metabolic Panel [[id number]]  (Abnormal) Collected: 05/22/25 1642    Specimen: Blood Updated: 05/22/25 1753     Glucose 98 mg/dL      BUN 15 mg/dL      Creatinine 1.10 mg/dL      Sodium 138 mmol/L      Potassium 4.1 mmol/L      Chloride 99 mmol/L      CO2 21.8 mmol/L      Calcium 10.0 mg/dL      Total Protein 7.5 g/dL      Albumin 4.8 g/dL      ALT (SGPT) 21 U/L      AST (SGOT) 26 U/L      Alkaline Phosphatase 51 U/L      Total Bilirubin 1.9 mg/dL      Globulin 2.7 gm/dL      A/G Ratio 1.8 g/dL      BUN/Creatinine Ratio 13.6     Anion Gap 17.2 mmol/L      eGFR 85.4 mL/min/1.73     Narrative:      GFR Categories in Chronic Kidney Disease (CKD)              GFR Category          GFR (mL/min/1.73)    Interpretation  G1                    90 or greater        Normal or high (1)  G2                    60-89                Mild decrease (1)  G3a                   45-59                Mild to moderate decrease  G3b                   30-44                Moderate to severe decrease  G4                    15-29                Severe decrease  G5                    14 or less           Kidney failure    (1)In the absence of evidence of kidney disease, neither GFR category G1 or G2 fulfill the criteria for CKD.    eGFR calculation 2021 CKD-EPI creatinine equation, which does not include race as a factor    High Sensitivity Troponin T [[id number]]  (Normal) Collected: 05/22/25 1642    Specimen: Blood Updated: 05/22/25 1753     HS Troponin T <6 ng/L     Narrative:      High Sensitive Troponin T Reference Range:  <14.0 ng/L- Negative Female for AMI  <22.0 ng/L- Negative Male for AMI  >=14 - Abnormal Female indicating possible myocardial injury.  >=22 - Abnormal Male indicating possible  myocardial injury.   Clinicians would have to utilize clinical acumen, EKG, Troponin, and serial changes to determine if it is an Acute Myocardial Infarction or myocardial injury due to an underlying chronic condition.         Lipase [[id number]]  (Abnormal) Collected: 05/22/25 1642    Specimen: Blood Updated: 05/22/25 1714     Lipase 62 U/L     Extra Tubes [[id number]] Collected: 05/22/25 1642    Specimen: Blood Updated: 05/22/25 1700    Narrative:      The following orders were created for panel order Extra Tubes.  Procedure                               Abnormality         Status                     ---------                               -----------         ------                     Lavender Top[[id number]]                                     Final result               Gold Top - SST[[id number]]                                   Final result               Light Blue Top[[id number]]                                   Final result                 Please view results for these tests on the individual orders.    Lavender Top [[id number]] Collected: 05/22/25 1642    Specimen: Blood Updated: 05/22/25 1700     Extra Tube hold for add-on     Comment: Auto resulted       Gold Top - SST [[id number]] Collected: 05/22/25 1642    Specimen: Blood Updated: 05/22/25 1700     Extra Tube Hold for add-ons.     Comment: Auto resulted.       Light Blue Top [[id number]] Collected: 05/22/25 1642    Specimen: Blood Updated: 05/22/25 1700     Extra Tube Hold for add-ons.     Comment: Auto resulted                Imaging Results (Last 24 Hours)       Procedure Component Value Units Date/Time    CT Angiogram Chest Pulmonary Embolism [[id number]] Collected: 05/22/25 1800     Updated: 05/22/25 1809    Narrative:      CT ANGIOGRAM CHEST PULMONARY EMBOLISM    Date of Exam: 5/22/2025 5:47 PM EDT    Indication: Right pleuritic chest pain.    Comparison: Chest radiograph 5/22/2025. Chest CT 8/10/2024.    Technique: Axial CT images were obtained of the  chest after the uneventful intravenous administration of iodinated contrast utilizing pulmonary embolism protocol.  In addition, a 3-D volume rendered image was created for interpretation.  Sagittal and   coronal reconstructions were performed.  Automated exposure control and iterative reconstruction methods were used.      Findings:    Thyroid and thoracic inlet: No significant abnormality.    Lymph nodes: No pathologic appearing lymph nodes by imaging criteria.    Cardiovascular: Normal appearing heart size. No pericardial effusion. Aorta and main pulmonary artery diameters are within normal range.No coronary artery calcifications.. Acute pulmonary emboli in the segmental and segmental pulmonary arteries of the   right lower lobe. Suboptimal visualization of a few segmental and subsegmental pulmonary arteries secondary to motion artifacts. No CT evidence of right heart strain.    Esophagus: No significant abnormality.    Lung parenchyma: Scattered atelectasis. No suspicious appearing opacities.    Airways: Patent trachea and mainstem bronchi.    Pleura: No pleural effusion or pneumothorax.    Chest wall and osseous structures: Degenerative changes of the imaged spine. No acute osseous abnormality.    Included abdomen: No significant abnormality.      Impression:      Impression:  Acute pulmonary emboli in the right lower lobe segmental and subsegmental pulmonary arteries. No CT evidence of right heart strain.    Critical Findings were verbally communicated with Dr. Jacobo Vigil MD on 5/22/2025 at 6:08 p.m.        Electronically Signed: Fredi Vigil MD    5/22/2025 6:06 PM EDT    Workstation ID: [id number]    XR Ribs Right With PA Chest [[id number]] Collected: 05/22/25 1650     Updated: 05/22/25 1653    Narrative:      XR RIBS RIGHT W PA CHEST    Date of Exam: 5/22/2025 4:43 PM EDT    Indication: right side chest wall pain    Comparison: AP chest 5/17/2025    Findings:  No acute displaced right rib  fracture. No pleural effusion or pneumothorax. Clear lungs. Normal heart size.      Impression:      Impression:  No displaced right rib fracture. No acute chest finding.      Electronically Signed: Anne Marie Schwartz MD    5/22/2025 4:51 PM EDT    Workstation ID: [id number]                 I reviewed the patient's new clinical results.    Medication Review:   Scheduled Meds:enoxaparin sodium, 1 mg/kg, Subcutaneous, Q12H  sodium chloride, 10 mL, Intravenous, Q12H  sucralfate, 1 g, Oral, 4x Daily  Vonoprazan Fumarate, 20 mg, Oral, Daily      Continuous Infusions:Pharmacy to Dose enoxaparin (LOVENOX),       PRN Meds:.  acetaminophen    senna-docusate sodium **AND** polyethylene glycol **AND** bisacodyl **AND** bisacodyl    HYDROmorphone    nitroglycerin    ondansetron ODT **OR** ondansetron    oxyCODONE    Pharmacy to Dose enoxaparin (LOVENOX)    [COMPLETED] Insert Peripheral IV **AND** sodium chloride    sodium chloride    sodium chloride     Assessment & Plan       Pulmonary embolism    History of DVT (deep vein thrombosis)    Erosive esophagitis    KEVON on CPAP    Elevated lipase    - continue therapeutic dose Lovenox for acute PE; transition to oral anticoagulation at discharge.  -home meds for GERD/Esophagitis      CODE Status:    Code status (Patient has no pulse and is not breathing):  CPR (Attempt to Resuscitate)  Medical Interventions (Patient has pulse or is breathing):  Full support  Level of support discussed with:  Patient    Admission status:  I believe this patient meets inpatient status  Expected length of stay:  2 midnights or greater  I discussed the patient's findings and my recommendations with the patient.    Plan for disposition:Home at discharge    Tessie Grewal MD  05/23/25  12:19 EDT

## 2025-05-23 NOTE — H&P
Patient Care Team:  Luan Gomes MD as PCP - General (Family Medicine)    Chief complaint right flank pain    Subjective     Patient is a 45 y.o. male with pmh of GERD, LLE DVT, KEVON on CPAP,  who presents with right sided abdominal pain- near flank area that is described as stabbing and cramping and worse when he eat that begain this am shortly after awakening. He does reports some intermittent shortness of breath. Has had nausea, but no vomiting, + chills, but no fever.  He denies any leg pain, swelling, periods of immobility.  He works at [employer] and spends 8-12 hours on his feet daily. Denies alcohol and nicotine use.    He originally presented to the [facility] ER where CT abd/pelvis without contrast did not show any acute process.  RUQ ultrasound was also normal.  WBC at that time was 8.29  He was discharged home but continued to feel very unwell and therefore presented to [facility] ER.  Lipase was elevated at 62, t. Bili 1.9.  WBC increased to 12.31 without anemia. Troponin neg x2.  Electrolytes were wNL but noted CO2 21.8 and anion gap increased to 17.2.  CXR was clear but CT PE revealed a Acute PE  in the segmental and segmental pulmonary arteries of the   right lower lobe. No evidence of right heart strain.  He was given a treatment dose of Lovenox.    He has had a long standing of GERD.  He was hospitalized on 5/17-5/18/2025 for epigastric pain and sinus bradycardia.  He was seen by GI who started him on Voquezna as he had poor success with PPI. Lipase was normal at that time. Last EGD 8/12/2024 showing grade B erosive esophagitis.  Cardiology was consulted who did not the sinus ary at 49 with left anterior fascicular block, but no other obvious changes with no further cardiac workup at that time.      Per chart review,  02/2017, he had a thrombus involving the entire greater saphenous vein without any flow.  Left Thrombus was extending from the greater saphenous, common femur vein confluence with a  small portion of thrombus within the common femoral vein without complete occlusion consistent with deep venous extension of the thrombus. He was started on Eliquis for 3 month minimum.  He saw Dr. Grubbs for hypercoagulable workup.  Patient did not recall every seeing a heme/oc at time of visit.    In 08/2024, He had a CT that was questional very small emboli within the subsegmental branches especially  in the right lower lobe, but thought it might have been artifact.  He was seen by pulmonary for hemoptysis at that time and treated for neck pain and GERD.  BLE venous dopplers were negative at that time.    Reports his grandmother had blood clots.    Review of Systems   Constitutional:  Positive for chills. Negative for fever.   Respiratory:  Positive for shortness of breath.    Cardiovascular:  Positive for chest pain. Negative for leg swelling.        Primarily along the right flank area   Gastrointestinal:  Positive for nausea. Negative for vomiting.          History  Past Medical History:   Diagnosis Date    Anxiety     GERD (gastroesophageal reflux disease)      Past Surgical History:   Procedure Laterality Date    ENDOSCOPY N/A 8/12/2024    Procedure: ESOPHAGOGASTRODUODENOSCOPY;  Surgeon: Oseas Pfeiffer MD;  Location: [facility] ENDOSCOPY;  Service: Gastroenterology;  Laterality: N/A;  POST-EROSIVE ESOPHAGITIS     History reviewed. No pertinent family history.  Social History     Tobacco Use    Smoking status: Former     Types: Cigars    Smokeless tobacco: Former     Types: Chew   Vaping Use    Vaping status: Never Used   Substance Use Topics    Alcohol use: Not Currently    Drug use: Not Currently     Types: Marijuana     Comment: occ     Medications Prior to Admission   Medication Sig Dispense Refill Last Dose/Taking    ondansetron ODT (ZOFRAN-ODT) 4 MG disintegrating tablet Place 1 tablet on the tongue Every 8 (Eight) Hours As Needed for Nausea or Vomiting. 15 tablet 0 5/21/2025    sucralfate  (CARAFATE) 1 g tablet Take 1 tablet by mouth 4 (Four) Times a Day.   5/22/2025 at 12:00 PM    Vonoprazan Fumarate (Voquezna) 20 MG tablet Take 1 tablet by mouth Daily.   5/22/2025     Allergies:  Prednisone    Objective     Vital Signs  Temp:  [97.8 °F (36.6 °C)-98.5 °F (36.9 °C)] 97.8 °F (36.6 °C)  Heart Rate:  [59-99] 59  Resp:  [11-22] 11  BP: (115-153)/(65-91) 121/75     Physical Exam:      General Appearance:    Alert, cooperative, in no acute distress   Head:    Normocephalic, without obvious abnormality, atraumatic   Eyes:            Lids and lashes normal, conjunctivae and sclerae normal, no   icterus, no pallor, corneas clear, PERRLA   Ears:    Ears appear intact with no abnormalities noted   Throat:   No oral lesions, no thrush, oral mucosa moist   Neck:   No adenopathy, supple, trachea midline, no thyromegaly, no   carotid bruit, no JVD   Lungs:     Clear to auscultation,respirations regular, even and                  unlabored    Heart:    Regular rhythm and normal rate, normal S1 and S2, no            murmur, no gallop, no rub, no click   Chest Wall:    No abnormalities observed   Abdomen:     Normal bowel sounds, no masses, no organomegaly, soft        tender, non-distended, no guarding, no rebound                tenderness   Extremities:   Moves all extremities well, no edema, no cyanosis, no             redness   Pulses:   Pulses palpable and equal bilaterally   Skin:   No bleeding, bruising or rash, clammy   Lymph nodes:   No palpable adenopathy   Neurologic:   Cranial nerves 2 - 12 grossly intact, sensation intact, DTR       present and equal bilaterally       Results Review:     Imaging Results (Last 24 Hours)       Procedure Component Value Units Date/Time    CT Angiogram Chest Pulmonary Embolism [[id number]] Collected: 05/22/25 1800     Updated: 05/22/25 1809    Narrative:      CT ANGIOGRAM CHEST PULMONARY EMBOLISM    Date of Exam: 5/22/2025 5:47 PM EDT    Indication: Right pleuritic chest  pain.    Comparison: Chest radiograph 5/22/2025. Chest CT 8/10/2024.    Technique: Axial CT images were obtained of the chest after the uneventful intravenous administration of iodinated contrast utilizing pulmonary embolism protocol.  In addition, a 3-D volume rendered image was created for interpretation.  Sagittal and   coronal reconstructions were performed.  Automated exposure control and iterative reconstruction methods were used.      Findings:    Thyroid and thoracic inlet: No significant abnormality.    Lymph nodes: No pathologic appearing lymph nodes by imaging criteria.    Cardiovascular: Normal appearing heart size. No pericardial effusion. Aorta and main pulmonary artery diameters are within normal range.No coronary artery calcifications.. Acute pulmonary emboli in the segmental and segmental pulmonary arteries of the   right lower lobe. Suboptimal visualization of a few segmental and subsegmental pulmonary arteries secondary to motion artifacts. No CT evidence of right heart strain.    Esophagus: No significant abnormality.    Lung parenchyma: Scattered atelectasis. No suspicious appearing opacities.    Airways: Patent trachea and mainstem bronchi.    Pleura: No pleural effusion or pneumothorax.    Chest wall and osseous structures: Degenerative changes of the imaged spine. No acute osseous abnormality.    Included abdomen: No significant abnormality.      Impression:      Impression:  Acute pulmonary emboli in the right lower lobe segmental and subsegmental pulmonary arteries. No CT evidence of right heart strain.    Critical Findings were verbally communicated with Dr. Jacobo Vigil MD on 5/22/2025 at 6:08 p.m.        Electronically Signed: Fredi Vigil MD    5/22/2025 6:06 PM EDT    Workstation ID: [id number]    XR Ribs Right With PA Chest [[id number]] Collected: 05/22/25 1650     Updated: 05/22/25 1653    Narrative:      XR RIBS RIGHT W PA CHEST    Date of Exam: 5/22/2025 4:43 PM  EDT    Indication: right side chest wall pain    Comparison: AP chest 5/17/2025    Findings:  No acute displaced right rib fracture. No pleural effusion or pneumothorax. Clear lungs. Normal heart size.      Impression:      Impression:  No displaced right rib fracture. No acute chest finding.      Electronically Signed: Anne Marie Schwartz MD    5/22/2025 4:51 PM EDT    Workstation ID: [id number]             Lab Results (last 24 hours)       Procedure Component Value Units Date/Time    Sedimentation Rate [[id number]]  (Normal) Collected: 05/22/25 1642    Specimen: Blood Updated: 05/22/25 1835     Sed Rate 9 mm/hr     High Sensitivity Troponin T 1Hr [[id number]] Collected: 05/22/25 1805    Specimen: Blood from Arm, Right Updated: 05/22/25 1833     HS Troponin T <6 ng/L      Troponin T Numeric Delta --     Comment: Unable to calculate.       Narrative:      High Sensitive Troponin T Reference Range:  <14.0 ng/L- Negative Female for AMI  <22.0 ng/L- Negative Male for AMI  >=14 - Abnormal Female indicating possible myocardial injury.  >=22 - Abnormal Male indicating possible myocardial injury.   Clinicians would have to utilize clinical acumen, EKG, Troponin, and serial changes to determine if it is an Acute Myocardial Infarction or myocardial injury due to an underlying chronic condition.         CBC & Differential [[id number]]  (Abnormal) Collected: 05/22/25 1642    Specimen: Blood Updated: 05/22/25 1828    Narrative:      The following orders were created for panel order CBC & Differential.  Procedure                               Abnormality         Status                     ---------                               -----------         ------                     CBC Auto Differential[[id number]]        Abnormal            Final result                 Please view results for these tests on the individual orders.    CBC Auto Differential [[id number]]  (Abnormal) Collected: 05/22/25 1642    Specimen: Blood Updated: 05/22/25 1828      WBC 12.31 10*3/mm3      RBC 5.05 10*6/mm3      Hemoglobin 16.6 g/dL      Hematocrit 48.7 %      MCV 96.4 fL      MCH 32.9 pg      MCHC 34.1 g/dL      RDW 10.8 %      RDW-SD 38.8 fl      MPV 10.5 fL      Platelets 223 10*3/mm3      Neutrophil % 68.6 %      Lymphocyte % 20.9 %      Monocyte % 9.4 %      Eosinophil % 0.6 %      Basophil % 0.2 %      Immature Grans % 0.3 %      Neutrophils, Absolute 8.44 10*3/mm3      Lymphocytes, Absolute 2.57 10*3/mm3      Monocytes, Absolute 1.16 10*3/mm3      Eosinophils, Absolute 0.07 10*3/mm3      Basophils, Absolute 0.03 10*3/mm3      Immature Grans, Absolute 0.04 10*3/mm3      nRBC 0.0 /100 WBC     Comprehensive Metabolic Panel [[id number]]  (Abnormal) Collected: 05/22/25 1642    Specimen: Blood Updated: 05/22/25 1753     Glucose 98 mg/dL      BUN 15 mg/dL      Creatinine 1.10 mg/dL      Sodium 138 mmol/L      Potassium 4.1 mmol/L      Chloride 99 mmol/L      CO2 21.8 mmol/L      Calcium 10.0 mg/dL      Total Protein 7.5 g/dL      Albumin 4.8 g/dL      ALT (SGPT) 21 U/L      AST (SGOT) 26 U/L      Alkaline Phosphatase 51 U/L      Total Bilirubin 1.9 mg/dL      Globulin 2.7 gm/dL      A/G Ratio 1.8 g/dL      BUN/Creatinine Ratio 13.6     Anion Gap 17.2 mmol/L      eGFR 85.4 mL/min/1.73     Narrative:      GFR Categories in Chronic Kidney Disease (CKD)              GFR Category          GFR (mL/min/1.73)    Interpretation  G1                    90 or greater        Normal or high (1)  G2                    60-89                Mild decrease (1)  G3a                   45-59                Mild to moderate decrease  G3b                   30-44                Moderate to severe decrease  G4                    15-29                Severe decrease  G5                    14 or less           Kidney failure    (1)In the absence of evidence of kidney disease, neither GFR category G1 or G2 fulfill the criteria for CKD.    eGFR calculation 2021 CKD-EPI creatinine equation, which does not  include race as a factor    High Sensitivity Troponin T [[id number]]  (Normal) Collected: 05/22/25 1642    Specimen: Blood Updated: 05/22/25 1753     HS Troponin T <6 ng/L     Narrative:      High Sensitive Troponin T Reference Range:  <14.0 ng/L- Negative Female for AMI  <22.0 ng/L- Negative Male for AMI  >=14 - Abnormal Female indicating possible myocardial injury.  >=22 - Abnormal Male indicating possible myocardial injury.   Clinicians would have to utilize clinical acumen, EKG, Troponin, and serial changes to determine if it is an Acute Myocardial Infarction or myocardial injury due to an underlying chronic condition.         Lipase [[id number]]  (Abnormal) Collected: 05/22/25 1642    Specimen: Blood Updated: 05/22/25 1714     Lipase 62 U/L     Extra Tubes [[id number]] Collected: 05/22/25 1642    Specimen: Blood Updated: 05/22/25 1700    Narrative:      The following orders were created for panel order Extra Tubes.  Procedure                               Abnormality         Status                     ---------                               -----------         ------                     Lavender Top[[id number]]                                     Final result               Gold Top - SST[[id number]]                                   Final result               Light Blue Top[[id number]]                                   Final result                 Please view results for these tests on the individual orders.    Lavender Top [[id number]] Collected: 05/22/25 1642    Specimen: Blood Updated: 05/22/25 1700     Extra Tube hold for add-on     Comment: Auto resulted       Gold Top - SST [[id number]] Collected: 05/22/25 1642    Specimen: Blood Updated: 05/22/25 1700     Extra Tube Hold for add-ons.     Comment: Auto resulted.       Light Blue Top [[id number]] Collected: 05/22/25 1642    Specimen: Blood Updated: 05/22/25 1700     Extra Tube Hold for add-ons.     Comment: Auto resulted                I reviewed the patient's new  clinical results.    Assessment & Plan       Pulmonary embolism    History of DVT (deep vein thrombosis)    Erosive esophagitis    KEVON on CPAP    Elevated lipase      PE   -echo    -duplex BLE   -Lovenox   -Heme/oc consult    Erosive esophagitis   -continue home Carafate and Voquezna-  recently started by GI    Elevated Lipase   -recheck in am along with amylase      CODE STATUS:  Code status (Patient has no pulse and is not breathing):  CPR (Attempt to Resuscitate)  Medical Interventions (Patient has pulse or is breathing):  Full Support  Level of Support Discussed with:  Patient    Admission Status:  I believe this patient meets inpatient status    Expected length of stay:  2 midnights or greater    I discussed the patient's findings and my recommendations with patient.     Gillian Hughes, SHAUNA  05/23/25  03:37 EDT

## 2025-05-23 NOTE — PLAN OF CARE
Problem: Adult Inpatient Plan of Care  Goal: Absence of Hospital-Acquired Illness or Injury  Intervention: Identify and Manage Fall Risk  Recent Flowsheet Documentation  Taken 5/23/2025 0200 by Carlota Mcmullen RN  Safety Promotion/Fall Prevention:   assistive device/personal items within reach   clutter free environment maintained   nonskid shoes/slippers when out of bed   room organization consistent   safety round/check completed  Taken 5/23/2025 0001 by Carlota Mcmullen RN  Safety Promotion/Fall Prevention:   assistive device/personal items within reach   clutter free environment maintained   nonskid shoes/slippers when out of bed   room organization consistent   safety round/check completed  Taken 5/22/2025 2001 by Carlota Mcmullen RN  Safety Promotion/Fall Prevention:   assistive device/personal items within reach   clutter free environment maintained   nonskid shoes/slippers when out of bed   room organization consistent   safety round/check completed  Intervention: Prevent Skin Injury  Recent Flowsheet Documentation  Taken 5/22/2025 2001 by Carlota Mcmullen RN  Body Position: position changed independently  Skin Protection:   incontinence pads utilized   transparent dressing maintained  Intervention: Prevent and Manage VTE (Venous Thromboembolism) Risk  Recent Flowsheet Documentation  Taken 5/23/2025 0001 by Carlota Mcmullen RN  VTE Prevention/Management:   SCDs (sequential compression devices) off   patient refused intervention  Taken 5/22/2025 2001 by Carlota Mcmullen RN  VTE Prevention/Management:   SCDs (sequential compression devices) off   patient refused intervention  Intervention: Prevent Infection  Recent Flowsheet Documentation  Taken 5/23/2025 0200 by Carlota Mcmullen RN  Infection Prevention:   environmental surveillance performed   hand hygiene promoted   personal protective equipment utilized   single patient room provided  Taken 5/23/2025 0001 by Carlota Mcmullen RN  Infection  Prevention:   environmental surveillance performed   hand hygiene promoted   personal protective equipment utilized   single patient room provided  Taken 5/22/2025 2001 by Carlota Mcmullen RN  Infection Prevention:   environmental surveillance performed   hand hygiene promoted   personal protective equipment utilized   single patient room provided  Goal: Optimal Comfort and Wellbeing  Intervention: Provide Person-Centered Care  Recent Flowsheet Documentation  Taken 5/22/2025 2001 by Carlota Mcmullen RN  Trust Relationship/Rapport:   care explained   choices provided   questions encouraged   questions answered   reassurance provided  Goal: Readiness for Transition of Care  Intervention: Mutually Develop Transition Plan  Recent Flowsheet Documentation  Taken 5/22/2025 2009 by Carlota Mcmullen RN  Transportation Anticipated: family or friend will provide  Patient/Family Anticipated Services at Transition: none  Patient/Family Anticipates Transition to: home with family  Taken 5/22/2025 2008 by Carlota Mcmullen RN  Equipment Currently Used at Home:   cpap   scales  Goal: Absence of Hospital-Acquired Illness or Injury  Intervention: Identify and Manage Fall Risk  Recent Flowsheet Documentation  Taken 5/23/2025 0200 by Carlota Mcmullen RN  Safety Promotion/Fall Prevention:   assistive device/personal items within reach   clutter free environment maintained   nonskid shoes/slippers when out of bed   room organization consistent   safety round/check completed  Taken 5/23/2025 0001 by Carlota Mcmullen RN  Safety Promotion/Fall Prevention:   assistive device/personal items within reach   clutter free environment maintained   nonskid shoes/slippers when out of bed   room organization consistent   safety round/check completed  Taken 5/22/2025 2001 by Carlota Mcmullen RN  Safety Promotion/Fall Prevention:   assistive device/personal items within reach   clutter free environment maintained   nonskid shoes/slippers when out  of bed   room organization consistent   safety round/check completed  Intervention: Prevent Skin Injury  Recent Flowsheet Documentation  Taken 5/22/2025 2001 by Carlota Mcmullen RN  Body Position: position changed independently  Skin Protection:   incontinence pads utilized   transparent dressing maintained  Intervention: Prevent and Manage VTE (Venous Thromboembolism) Risk  Recent Flowsheet Documentation  Taken 5/23/2025 0001 by Carlota Mcmullen RN  VTE Prevention/Management:   SCDs (sequential compression devices) off   patient refused intervention  Taken 5/22/2025 2001 by Carlota Mcmullen RN  VTE Prevention/Management:   SCDs (sequential compression devices) off   patient refused intervention  Intervention: Prevent Infection  Recent Flowsheet Documentation  Taken 5/23/2025 0200 by Carlota Mcmullen RN  Infection Prevention:   environmental surveillance performed   hand hygiene promoted   personal protective equipment utilized   single patient room provided  Taken 5/23/2025 0001 by Carlota Mcmullen RN  Infection Prevention:   environmental surveillance performed   hand hygiene promoted   personal protective equipment utilized   single patient room provided  Taken 5/22/2025 2001 by Carlota Mcmullen RN  Infection Prevention:   environmental surveillance performed   hand hygiene promoted   personal protective equipment utilized   single patient room provided  Goal: Optimal Comfort and Wellbeing  Intervention: Provide Person-Centered Care  Recent Flowsheet Documentation  Taken 5/22/2025 2001 by Carlota Mcmullen RN  Trust Relationship/Rapport:   care explained   choices provided   questions encouraged   questions answered   reassurance provided  Goal: Readiness for Transition of Care  Intervention: Mutually Develop Transition Plan  Recent Flowsheet Documentation  Taken 5/22/2025 2009 by Carlota Mcmullen RN  Transportation Anticipated: family or friend will provide  Patient/Family Anticipated Services at  Transition: none  Patient/Family Anticipates Transition to: home with family  Taken 5/22/2025 2008 by Carlota Mcmullen, RN  Equipment Currently Used at Home:   cpap   scales   Goal Outcome Evaluation:

## 2025-05-24 ENCOUNTER — APPOINTMENT (OUTPATIENT)
Dept: GENERAL RADIOLOGY | Facility: HOSPITAL | Age: 45
DRG: 176 | End: 2025-05-24
Payer: COMMERCIAL

## 2025-05-24 LAB
ANION GAP SERPL CALCULATED.3IONS-SCNC: 8.1 MMOL/L (ref 5–15)
APTT SCREEN TO CONFIRM RATIO: 0.96 RATIO (ref 0–1.34)
BASOPHILS # BLD AUTO: 0.04 10*3/MM3 (ref 0–0.2)
BASOPHILS NFR BLD AUTO: 0.6 % (ref 0–1.5)
BUN SERPL-MCNC: 13 MG/DL (ref 6–20)
BUN/CREAT SERPL: 12.6 (ref 7–25)
CALCIUM SPEC-SCNC: 9.8 MG/DL (ref 8.6–10.5)
CARDIOLIPIN IGA SER IA-ACNC: <9 APL U/ML (ref 0–11)
CARDIOLIPIN IGG SER IA-ACNC: <9 GPL U/ML (ref 0–14)
CARDIOLIPIN IGM SER IA-ACNC: <9 MPL U/ML (ref 0–12)
CHLORIDE SERPL-SCNC: 102 MMOL/L (ref 98–107)
CO2 SERPL-SCNC: 26.9 MMOL/L (ref 22–29)
CONFIRM APTT/NORMAL: 34.2 SEC (ref 0–47.6)
CREAT SERPL-MCNC: 1.03 MG/DL (ref 0.76–1.27)
DEPRECATED RDW RBC AUTO: 39.4 FL (ref 37–54)
EGFRCR SERPLBLD CKD-EPI 2021: 92.4 ML/MIN/1.73
EOSINOPHIL # BLD AUTO: 0.18 10*3/MM3 (ref 0–0.4)
EOSINOPHIL NFR BLD AUTO: 2.6 % (ref 0.3–6.2)
ERYTHROCYTE [DISTWIDTH] IN BLOOD BY AUTOMATED COUNT: 11 % (ref 12.3–15.4)
GLUCOSE SERPL-MCNC: 102 MG/DL (ref 65–99)
HCT VFR BLD AUTO: 46.7 % (ref 37.5–51)
HGB BLD-MCNC: 15.8 G/DL (ref 13–17.7)
IMM GRANULOCYTES # BLD AUTO: 0.02 10*3/MM3 (ref 0–0.05)
IMM GRANULOCYTES NFR BLD AUTO: 0.3 % (ref 0–0.5)
LA 2 SCREEN W REFLEX-IMP: NORMAL
LYMPHOCYTES # BLD AUTO: 1.91 10*3/MM3 (ref 0.7–3.1)
LYMPHOCYTES NFR BLD AUTO: 28 % (ref 19.6–45.3)
MCH RBC QN AUTO: 32.7 PG (ref 26.6–33)
MCHC RBC AUTO-ENTMCNC: 33.8 G/DL (ref 31.5–35.7)
MCV RBC AUTO: 96.7 FL (ref 79–97)
MONOCYTES # BLD AUTO: 0.68 10*3/MM3 (ref 0.1–0.9)
MONOCYTES NFR BLD AUTO: 10 % (ref 5–12)
NEUTROPHILS NFR BLD AUTO: 4 10*3/MM3 (ref 1.7–7)
NEUTROPHILS NFR BLD AUTO: 58.5 % (ref 42.7–76)
NRBC BLD AUTO-RTO: 0 /100 WBC (ref 0–0.2)
PLATELET # BLD AUTO: 199 10*3/MM3 (ref 140–450)
PMV BLD AUTO: 10.2 FL (ref 6–12)
POTASSIUM SERPL-SCNC: 4.2 MMOL/L (ref 3.5–5.2)
RBC # BLD AUTO: 4.83 10*6/MM3 (ref 4.14–5.8)
SCREEN APTT: 33.5 SEC (ref 0–43.5)
SCREEN DRVVT: 37.2 SEC (ref 0–47)
SODIUM SERPL-SCNC: 137 MMOL/L (ref 136–145)
THROMBIN TIME: 20.2 SEC (ref 0–23)
WBC NRBC COR # BLD AUTO: 6.83 10*3/MM3 (ref 3.4–10.8)

## 2025-05-24 PROCEDURE — 25010000002 ONDANSETRON PER 1 MG

## 2025-05-24 PROCEDURE — 25010000002 HYDROMORPHONE PER 4 MG

## 2025-05-24 PROCEDURE — 74018 RADEX ABDOMEN 1 VIEW: CPT

## 2025-05-24 PROCEDURE — 85025 COMPLETE CBC W/AUTO DIFF WBC: CPT

## 2025-05-24 PROCEDURE — 96376 TX/PRO/DX INJ SAME DRUG ADON: CPT

## 2025-05-24 PROCEDURE — 96372 THER/PROPH/DIAG INJ SC/IM: CPT

## 2025-05-24 PROCEDURE — G0378 HOSPITAL OBSERVATION PER HR: HCPCS

## 2025-05-24 PROCEDURE — 80048 BASIC METABOLIC PNL TOTAL CA: CPT

## 2025-05-24 PROCEDURE — 25010000002 ENOXAPARIN PER 10 MG

## 2025-05-24 RX ADMIN — OXYCODONE 5 MG: 5 TABLET ORAL at 16:55

## 2025-05-24 RX ADMIN — Medication 10 ML: at 08:36

## 2025-05-24 RX ADMIN — SUCRALFATE 1 G: 1 TABLET ORAL at 12:37

## 2025-05-24 RX ADMIN — ONDANSETRON 4 MG: 2 INJECTION, SOLUTION INTRAMUSCULAR; INTRAVENOUS at 08:23

## 2025-05-24 RX ADMIN — POLYETHYLENE GLYCOL 3350 17 G: 17 POWDER, FOR SOLUTION ORAL at 15:57

## 2025-05-24 RX ADMIN — SENNOSIDES AND DOCUSATE SODIUM 2 TABLET: 50; 8.6 TABLET ORAL at 10:43

## 2025-05-24 RX ADMIN — BISACODYL 5 MG: 5 TABLET, COATED ORAL at 16:55

## 2025-05-24 RX ADMIN — Medication 10 ML: at 20:19

## 2025-05-24 RX ADMIN — SUCRALFATE 1 G: 1 TABLET ORAL at 08:23

## 2025-05-24 RX ADMIN — ENOXAPARIN SODIUM 105 MG: 150 INJECTION SUBCUTANEOUS at 20:19

## 2025-05-24 RX ADMIN — SUCRALFATE 1 G: 1 TABLET ORAL at 17:06

## 2025-05-24 RX ADMIN — HYDROMORPHONE HYDROCHLORIDE 0.5 MG: 1 INJECTION, SOLUTION INTRAMUSCULAR; INTRAVENOUS; SUBCUTANEOUS at 20:26

## 2025-05-24 RX ADMIN — ENOXAPARIN SODIUM 105 MG: 150 INJECTION SUBCUTANEOUS at 08:22

## 2025-05-24 RX ADMIN — HYDROMORPHONE HYDROCHLORIDE 0.5 MG: 1 INJECTION, SOLUTION INTRAMUSCULAR; INTRAVENOUS; SUBCUTANEOUS at 08:23

## 2025-05-24 RX ADMIN — OXYCODONE 5 MG: 5 TABLET ORAL at 05:57

## 2025-05-24 RX ADMIN — BISACODYL 10 MG: 10 SUPPOSITORY RECTAL at 18:58

## 2025-05-24 NOTE — PROGRESS NOTES
LOS: 1 day   Patient Care Team:  Luan Gomes MD as PCP - General (Family Medicine)    Subjective     Interval History: Stable overnight    Patient Complaints: Less chest pain. Woke this morning with pain in LLQ and LUQ of abdomen with passing of gas. No bowel movement in 3-4 days.    History taken from: patient    Review of Systems   Constitutional:  Positive for activity change. Negative for fatigue.   HENT:  Negative for trouble swallowing.    Eyes:  Positive for visual disturbance.   Respiratory:  Negative for cough, shortness of breath and wheezing.    Cardiovascular:  Negative for chest pain, palpitations and leg swelling.   Gastrointestinal:  Positive for abdominal pain and constipation. Negative for nausea and vomiting.   Endocrine: Negative for polyuria.   Genitourinary:  Negative for difficulty urinating.   Musculoskeletal:  Negative for gait problem.   Skin:  Negative for rash.   Neurological:  Negative for weakness and headaches.   Psychiatric/Behavioral:  Negative for confusion.            Objective     Vital Signs  Temp:  [97.4 °F (36.3 °C)-99.2 °F (37.3 °C)] 99.2 °F (37.3 °C)  Heart Rate:  [47-79] 65  Resp:  [13-21] 17  BP: (110-127)/(69-82) 121/82    Physical Exam:     General Appearance:    Alert, cooperative, in no acute distress,   Head:    Normocephalic, without obvious abnormality, atraumatic   Eyes:            Lids and lashes normal, conjunctivae and sclerae normal, no   icterus, no pallor, corneas clear, PERRLA   Ears:    Ears appear intact with no abnormalities noted   Throat:   No oral lesions, no thrush, oral mucosa moist   Neck:   No adenopathy, supple, trachea midline, no thyromegaly, no   carotid bruit, no JVD   Lungs:     Clear to auscultation,respirations regular, even and                unlabored    Heart:    Regular rhythm and normal rate, normal S1 and S2, no          murmur, no gallop, no rub, no click   Chest Wall:    No abnormalities observed   Abdomen:     Mild  tenderness to LLQ and LUQ. Normal bowel sounds, no masses, no organomegaly, soft   Non-distended, no guarding,   Extremities:   Moves all extremities well, no edema, no cyanosis, no Redness   Pulses:   Pulses palpable and equal bilaterally   Skin:   No bleeding, bruising or rash   Lymph nodes:   No palpable adenopathy            Results Review:    Lab Results (last 24 hours)       Procedure Component Value Units Date/Time    Basic Metabolic Panel [[id number]]  (Abnormal) Collected: 05/24/25 0405    Specimen: Blood from Arm, Right Updated: 05/24/25 0447     Glucose 102 mg/dL      BUN 13 mg/dL      Creatinine 1.03 mg/dL      Sodium 137 mmol/L      Potassium 4.2 mmol/L      Chloride 102 mmol/L      CO2 26.9 mmol/L      Calcium 9.8 mg/dL      BUN/Creatinine Ratio 12.6     Anion Gap 8.1 mmol/L      eGFR 92.4 mL/min/1.73     Narrative:      GFR Categories in Chronic Kidney Disease (CKD)              GFR Category          GFR (mL/min/1.73)    Interpretation  G1                    90 or greater        Normal or high (1)  G2                    60-89                Mild decrease (1)  G3a                   45-59                Mild to moderate decrease  G3b                   30-44                Moderate to severe decrease  G4                    15-29                Severe decrease  G5                    14 or less           Kidney failure    (1)In the absence of evidence of kidney disease, neither GFR category G1 or G2 fulfill the criteria for CKD.    eGFR calculation 2021 CKD-EPI creatinine equation, which does not include race as a factor    CBC & Differential [[id number]]  (Abnormal) Collected: 05/24/25 0405    Specimen: Blood from Arm, Right Updated: 05/24/25 0425    Narrative:      The following orders were created for panel order CBC & Differential.  Procedure                               Abnormality         Status                     ---------                               -----------         ------                      CBC Auto Differential[[id number]]        Abnormal            Final result                 Please view results for these tests on the individual orders.    CBC Auto Differential [[id number]]  (Abnormal) Collected: 05/24/25 0405    Specimen: Blood from Arm, Right Updated: 05/24/25 0425     WBC 6.83 10*3/mm3      RBC 4.83 10*6/mm3      Hemoglobin 15.8 g/dL      Hematocrit 46.7 %      MCV 96.7 fL      MCH 32.7 pg      MCHC 33.8 g/dL      RDW 11.0 %      RDW-SD 39.4 fl      MPV 10.2 fL      Platelets 199 10*3/mm3      Neutrophil % 58.5 %      Lymphocyte % 28.0 %      Monocyte % 10.0 %      Eosinophil % 2.6 %      Basophil % 0.6 %      Immature Grans % 0.3 %      Neutrophils, Absolute 4.00 10*3/mm3      Lymphocytes, Absolute 1.91 10*3/mm3      Monocytes, Absolute 0.68 10*3/mm3      Eosinophils, Absolute 0.18 10*3/mm3      Basophils, Absolute 0.04 10*3/mm3      Immature Grans, Absolute 0.02 10*3/mm3      nRBC 0.0 /100 WBC              Imaging Results (Last 24 Hours)       Procedure Component Value Units Date/Time    XR Abdomen KUB [[id number]] Collected: 05/24/25 1135     Updated: 05/24/25 1155    Narrative:      XR ABDOMEN KUB    Date of Exam: 5/24/2025 11:27 AM EDT    Indication: Constipation, abdominal pain    Comparison: CT abdomen and pelvis dated 5/22/2025    Findings:  The lung bases are clear. There is no organomegaly. There is a nonspecific nonobstructive small bowel gas pattern. There is mild gaseous distention of the colon. There is a mild colonic stool burden. No abnormal renal calcifications. There are   degenerative changes of the thoracolumbar spine.      Impression:      Impression:  1.Nonspecific nonobstructive small bowel gas pattern. Mild gaseous distention of the colon.  2.Mild colonic stool burden.        Electronically Signed: Gregg Shultz MD    5/24/2025 11:53 AM EDT    Workstation ID: [id number]                 I reviewed the patient's new clinical results.    Medication Review:   Scheduled  Meds:enoxaparin sodium, 1 mg/kg, Subcutaneous, Q12H  sodium chloride, 10 mL, Intravenous, Q12H  sucralfate, 1 g, Oral, 4x Daily  Vonoprazan Fumarate, 20 mg, Oral, Daily      Continuous Infusions:Pharmacy to Dose enoxaparin (LOVENOX),       PRN Meds:.  acetaminophen    senna-docusate sodium **AND** polyethylene glycol **AND** bisacodyl **AND** bisacodyl    HYDROmorphone    nitroglycerin    ondansetron ODT **OR** ondansetron    oxyCODONE    Pharmacy to Dose enoxaparin (LOVENOX)    [COMPLETED] Insert Peripheral IV **AND** sodium chloride    sodium chloride    sodium chloride     Assessment & Plan       Pulmonary embolism    History of DVT (deep vein thrombosis)    Erosive esophagitis    KEVON on CPAP    Elevated lipase      -transition to Eliquis at discharge, continue Lovenox for acute PE  -denies any further chest pain  -bowel regiment for constipation. He has not had a bowel movement in 3-4 days. Abdominal pain this morning more consistent with constipation. KUB showed gas distension, no obstruction, mild colonic stool burden. Discussed with patient to decrease frequency of pain medication.  -Sucralfate and Voquezna for erosive esophagitis      CODE Status:    Code status (Patient has no pulse and is not breathing):  CPR (Attempt to Resuscitate)  Medical Interventions (Patient has pulse or is breathing):  Full support  Level of support discussed with:  Patient    Admission status:  I believe this patient meets inpatient status  Expected length of stay:  2 midnights or greater  I discussed the patient's findings and my recommendations with the patient.    Plan for disposition:Home    Renee Pena PA-C  05/24/25  15:52 EDT

## 2025-05-24 NOTE — NURSING NOTE
Agreed with Primary RN's skin assessment.   GENERAL SURGERY PROGRESS NOTE    CHIEF COMPLAINT: Abdominal pain    SUBJECTIVE:  Reports he is not feeling as well this morning. States that after eating dinner last night he developed abdominal bloating. He states he ate chocolate pudding, ice cream, and hot chocolate which he normally does not consume on a regular basis. He states that he is burping quite a bit but he is not nauseous. He additional reports \"increased phlegm\" but denies emesis. He states he is still passing flatus and having some small bowel movements. He additional reports he woke up this morning rather hoarse and is unsure why exactly.    OBJECTIVE:     Vital Last Value 24 Hour Range   Temperature 97.9 °F (36.6 °C) (09/23/21 0933) Temp  Min: 97.3 °F (36.3 °C)  Max: 97.9 °F (36.6 °C)   Pulse 75 (09/23/21 0933) Pulse  Min: 72  Max: 89   Respiratory 16 (09/23/21 0933) Resp  Min: 16  Max: 19   Non-Invasive  Blood Pressure (!) 144/77 (09/23/21 0933) BP  Min: 120/74  Max: 159/75   Pulse Oximetry 93 % (09/23/21 1008) SpO2  Min: 90 %  Max: 98 %   Arterial   Blood Pressure   No data recorded         Recent Weights:  Weight    09/20/21 0338 09/21/21 0907 09/22/21 0534 09/23/21 0520   Weight: 102.9 kg (226 lb 14.4 oz) 102 kg (224 lb 14.4 oz) 100.5 kg (221 lb 9 oz) 100.9 kg (222 lb 6.4 oz)       General: Alert, awake, in no acute distress.  Abdomen: Soft, obese, mildly distended, nontender.   Psych: Normal affect, normal interaction.    Intake/Output:    Intake/Output Summary (Last 24 hours) at 9/23/2021 1022  Last data filed at 9/22/2021 1414  Gross per 24 hour   Intake 774 ml   Output --   Net 774 ml        Last Stool Occurrence:  1 (Liquid) (09/22/21 0738)    Laboratory Results:  Recent Labs   Lab 09/23/21  0506 09/22/21  0826 09/21/21  0532   WBC 6.5 6.7 5.8   RBC 3.92* 3.65* 3.59*   HCT 37.3* 35.1* 33.9*   HGB 12.6* 11.7* 11.7*    205 196     Recent Labs   Lab 09/23/21  0506 09/22/21  0826 09/21/21  1739 09/21/21  0532 09/20/21  0725  09/18/21  1844 09/18/21  1835   SODIUM 128* 131* 127* 125* 127*   < > 129*   POTASSIUM 4.2 3.7 4.3 4.3 4.3   < > 4.7   CHLORIDE 96* 98 95* 90* 92*   < > 94*   CO2 23 25 23 24 26   < > 26   GLUCOSE 120* 83 90 110* 111*   < > 101*   BUN 33* 32* 29* 23* 19   < > 25*   CREATININE 1.81* 1.88* 1.84* 1.60* 1.47*  --  1.56*   CALCIUM 9.3 8.9 9.4 9.3 9.3   < > 9.9   ALBUMIN  --   --   --   --  3.3*  --  3.9   MG  --   --   --  2.0 1.4*  --   --    BILIRUBIN  --   --   --   --  1.0  --  0.7   ALKPT  --   --   --   --  55  --  72   AST  --   --   --   --  18  --  27   GPT  --   --   --   --  24  --  39   BCRAT 18 17 16 14 13   < > 16    < > = values in this interval not displayed.       Imaging:  XR ABDOMEN AP KUB    Result Date: 9/20/2021  XR ABDOMEN 1 VW KUB SUPINE HISTORY: eval sbo COMPARISON:  September 19, 2021 radiograph     Findings/impression: Loops of small bowel are dilated up to 4.4 cm. This could be due to ileus or potentially small bowel obstruction. No evidence of free air. No evidence of bowel wall pneumatosis. Multilevel spinal fusion hardware noted. Previous CABG findings noted.      IMPRESSION:  82 year old male with past medical history significant for AFib, CAD s/p CABG x2, CHF, CKD, COPD, HTN, hypothyroidism, and gout presenting with complaints of abdominal pain. CT abdomen/pelvis upon admission was concerning for small bowel obstruction.       PLAN:  -Unsure of exact reason for increased distension this morning, could be related to increased dairy consumption. Will order KUB for further assessment.  -Discussed with patient that it is ok to continue full liquids, but would try to limit dairy consumption  -NPO with exception to ice chips, limit po medications  -Consider restarting Plavix pending KUB results  -IV Pepcid  -Nephrology following  -PRN pain medications and antiemetics  -Medical management per attending        Arleth Yeh PA-C  [phone]  After 5 pm please page the on call surgeon for the  Acute Care Service at [phone] with any emergent concerns

## 2025-05-24 NOTE — PLAN OF CARE
Goal Outcome Evaluation:  Plan of Care Reviewed With: patient        Progress: improving  Outcome Evaluation: Patient AOx4, resting in between care. Able to mke needs known. on RA. Complained of chest pain , but can be localized. Resolved with PRN meds. Bowel regimen ongoing, still hasn't pass BM. complained of scrotal pain, PRN meds given as well. Calllight within reach

## 2025-05-24 NOTE — PLAN OF CARE
Pt A&Ox4. On room air and home cpap at HS. Pt refusing pain medicine at this time, pt repositioning self. Pt to D/C in the AM  Problem: Adult Inpatient Plan of Care  Goal: Plan of Care Review  Outcome: Progressing  Goal: Patient-Specific Goal (Individualized)  Outcome: Progressing  Goal: Absence of Hospital-Acquired Illness or Injury  Outcome: Progressing  Intervention: Identify and Manage Fall Risk  Intervention: Prevent Skin Injury  Intervention: Prevent and Manage VTE (Venous Thromboembolism) Risk  Intervention: Prevent Infection  Goal: Optimal Comfort and Wellbeing  Outcome: Progressing  Intervention: Provide Person-Centered Care  Goal: Readiness for Transition of Care  Outcome: Progressing  Goal: Plan of Care Review  Outcome: Progressing  Goal: Patient-Specific Goal (Individualized)  Outcome: Progressing  Goal: Absence of Hospital-Acquired Illness or Injury  Outcome: Progressing  Intervention: Identify and Manage Fall Risk  Intervention: Prevent Skin Injury  Intervention: Prevent and Manage VTE (Venous Thromboembolism) Risk  Intervention: Prevent Infection  Goal: Optimal Comfort and Wellbeing  Outcome: Progressing  Intervention: Provide Person-Centered Care  Goal: Readiness for Transition of Care  Outcome: Progressing  Goal: Plan of Care Review  Outcome: Progressing  Goal: Patient-Specific Goal (Individualized)  Outcome: Progressing  Goal: Absence of Hospital-Acquired Illness or Injury  Outcome: Progressing  Intervention: Identify and Manage Fall Risk  Intervention: Prevent Skin Injury  Intervention: Prevent and Manage VTE (Venous Thromboembolism) Risk  Intervention: Prevent Infection  Goal: Optimal Comfort and Wellbeing  Outcome: Progressing  Intervention: Provide Person-Centered Care  Goal: Readiness for Transition of Care  Outcome: Progressing   Goal Outcome Evaluation:

## 2025-05-25 LAB
ANION GAP SERPL CALCULATED.3IONS-SCNC: 10.3 MMOL/L (ref 5–15)
BASOPHILS # BLD AUTO: 0.04 10*3/MM3 (ref 0–0.2)
BASOPHILS NFR BLD AUTO: 0.6 % (ref 0–1.5)
BUN SERPL-MCNC: 11 MG/DL (ref 6–20)
BUN/CREAT SERPL: 10.4 (ref 7–25)
CALCIUM SPEC-SCNC: 9.8 MG/DL (ref 8.6–10.5)
CHLORIDE SERPL-SCNC: 103 MMOL/L (ref 98–107)
CO2 SERPL-SCNC: 26.7 MMOL/L (ref 22–29)
CREAT SERPL-MCNC: 1.06 MG/DL (ref 0.76–1.27)
DEPRECATED RDW RBC AUTO: 39.6 FL (ref 37–54)
EGFRCR SERPLBLD CKD-EPI 2021: 89.3 ML/MIN/1.73
EOSINOPHIL # BLD AUTO: 0.13 10*3/MM3 (ref 0–0.4)
EOSINOPHIL NFR BLD AUTO: 1.9 % (ref 0.3–6.2)
ERYTHROCYTE [DISTWIDTH] IN BLOOD BY AUTOMATED COUNT: 11 % (ref 12.3–15.4)
GLUCOSE SERPL-MCNC: 100 MG/DL (ref 65–99)
HCT VFR BLD AUTO: 48.7 % (ref 37.5–51)
HGB BLD-MCNC: 16.8 G/DL (ref 13–17.7)
IMM GRANULOCYTES # BLD AUTO: 0.01 10*3/MM3 (ref 0–0.05)
IMM GRANULOCYTES NFR BLD AUTO: 0.1 % (ref 0–0.5)
LYMPHOCYTES # BLD AUTO: 2.26 10*3/MM3 (ref 0.7–3.1)
LYMPHOCYTES NFR BLD AUTO: 33.4 % (ref 19.6–45.3)
MCH RBC QN AUTO: 33.5 PG (ref 26.6–33)
MCHC RBC AUTO-ENTMCNC: 34.5 G/DL (ref 31.5–35.7)
MCV RBC AUTO: 97.2 FL (ref 79–97)
MONOCYTES # BLD AUTO: 0.65 10*3/MM3 (ref 0.1–0.9)
MONOCYTES NFR BLD AUTO: 9.6 % (ref 5–12)
NEUTROPHILS NFR BLD AUTO: 3.68 10*3/MM3 (ref 1.7–7)
NEUTROPHILS NFR BLD AUTO: 54.4 % (ref 42.7–76)
NRBC BLD AUTO-RTO: 0 /100 WBC (ref 0–0.2)
PLATELET # BLD AUTO: 213 10*3/MM3 (ref 140–450)
PMV BLD AUTO: 10.1 FL (ref 6–12)
POTASSIUM SERPL-SCNC: 4.3 MMOL/L (ref 3.5–5.2)
RBC # BLD AUTO: 5.01 10*6/MM3 (ref 4.14–5.8)
SODIUM SERPL-SCNC: 140 MMOL/L (ref 136–145)
WBC NRBC COR # BLD AUTO: 6.77 10*3/MM3 (ref 3.4–10.8)

## 2025-05-25 PROCEDURE — 25010000002 HYDROMORPHONE PER 4 MG

## 2025-05-25 PROCEDURE — 96372 THER/PROPH/DIAG INJ SC/IM: CPT

## 2025-05-25 PROCEDURE — 25010000002 ONDANSETRON PER 1 MG

## 2025-05-25 PROCEDURE — 85025 COMPLETE CBC W/AUTO DIFF WBC: CPT

## 2025-05-25 PROCEDURE — 96376 TX/PRO/DX INJ SAME DRUG ADON: CPT

## 2025-05-25 PROCEDURE — 80048 BASIC METABOLIC PNL TOTAL CA: CPT

## 2025-05-25 PROCEDURE — 25010000002 ENOXAPARIN PER 10 MG

## 2025-05-25 RX ADMIN — SENNOSIDES AND DOCUSATE SODIUM 2 TABLET: 50; 8.6 TABLET ORAL at 11:51

## 2025-05-25 RX ADMIN — SUCRALFATE 1 G: 1 TABLET ORAL at 08:09

## 2025-05-25 RX ADMIN — Medication 10 ML: at 20:35

## 2025-05-25 RX ADMIN — ENOXAPARIN SODIUM 105 MG: 150 INJECTION SUBCUTANEOUS at 08:09

## 2025-05-25 RX ADMIN — Medication 10 ML: at 08:14

## 2025-05-25 RX ADMIN — ENOXAPARIN SODIUM 105 MG: 150 INJECTION SUBCUTANEOUS at 20:35

## 2025-05-25 RX ADMIN — OXYCODONE 5 MG: 5 TABLET ORAL at 08:10

## 2025-05-25 RX ADMIN — POLYETHYLENE GLYCOL 3350 17 G: 17 POWDER, FOR SOLUTION ORAL at 13:33

## 2025-05-25 RX ADMIN — HYDROMORPHONE HYDROCHLORIDE 0.5 MG: 1 INJECTION, SOLUTION INTRAMUSCULAR; INTRAVENOUS; SUBCUTANEOUS at 13:33

## 2025-05-25 RX ADMIN — ONDANSETRON 4 MG: 2 INJECTION, SOLUTION INTRAMUSCULAR; INTRAVENOUS at 14:45

## 2025-05-25 NOTE — PROGRESS NOTES
LOS: 1 day   Patient Care Team:  Luan Gomes MD as PCP - General (Family Medicine)    Subjective     Interval History: Small bowel movement overnight. Continues with abdominal pain, but less than  yesterday. Urinating normally.    Patient Complaints: per above    History taken from: patient    Review of Systems   Constitutional:  Positive for activity change and fatigue.   HENT:  Negative for trouble swallowing.    Respiratory:  Negative for chest tightness, shortness of breath and wheezing.    Cardiovascular:  Negative for chest pain, palpitations and leg swelling.   Gastrointestinal:  Positive for abdominal pain and constipation. Negative for nausea and vomiting.   Endocrine: Negative for polyuria.   Genitourinary:  Negative for difficulty urinating.   Musculoskeletal:  Negative for gait problem.   Skin:  Negative for rash.   Neurological:  Negative for weakness.   Psychiatric/Behavioral:  Negative for confusion.            Objective     Vital Signs  Temp:  [97.4 °F (36.3 °C)-98.1 °F (36.7 °C)] 98.1 °F (36.7 °C)  Heart Rate:  [52-67] 66  Resp:  [13-26] 20  BP: (120-127)/(74-84) 120/77    Physical Exam:     General Appearance:    Alert, cooperative, in no acute distress,   Head:    Normocephalic, without obvious abnormality, atraumatic   Eyes:            Lids and lashes normal, conjunctivae and sclerae normal, no   icterus, no pallor, corneas clear   Ears:    Ears appear intact with no abnormalities noted   Throat:   No oral lesions, no thrush, oral mucosa moist   Neck:   No adenopathy, supple, trachea midline, no thyromegaly, no   carotid bruit, no JVD   Lungs:     Clear to auscultation,respirations regular, even and                unlabored    Heart:    Regular rhythm and normal rate, normal S1 and S2, no          murmur, no gallop, no rub, no click   Chest Wall:    No abnormalities observed   Abdomen:     Normal bowel sounds, no masses, no organomegaly, soft      Non-tender non-distended, no guarding,    Extremities:   Moves all extremities well, no edema, no cyanosis, no           Redness   Pulses:   Pulses palpable and equal bilaterally   Skin:   No bleeding, bruising or rash   Lymph nodes:   No palpable adenopathy            Results Review:    Lab Results (last 24 hours)       Procedure Component Value Units Date/Time    Basic Metabolic Panel [[id number]]  (Abnormal) Collected: 05/25/25 0457    Specimen: Blood from Arm, Left Updated: 05/25/25 0530     Glucose 100 mg/dL      BUN 11 mg/dL      Creatinine 1.06 mg/dL      Sodium 140 mmol/L      Potassium 4.3 mmol/L      Chloride 103 mmol/L      CO2 26.7 mmol/L      Calcium 9.8 mg/dL      BUN/Creatinine Ratio 10.4     Anion Gap 10.3 mmol/L      eGFR 89.3 mL/min/1.73     Narrative:      GFR Categories in Chronic Kidney Disease (CKD)              GFR Category          GFR (mL/min/1.73)    Interpretation  G1                    90 or greater        Normal or high (1)  G2                    60-89                Mild decrease (1)  G3a                   45-59                Mild to moderate decrease  G3b                   30-44                Moderate to severe decrease  G4                    15-29                Severe decrease  G5                    14 or less           Kidney failure    (1)In the absence of evidence of kidney disease, neither GFR category G1 or G2 fulfill the criteria for CKD.    eGFR calculation 2021 CKD-EPI creatinine equation, which does not include race as a factor    CBC & Differential [[id number]]  (Abnormal) Collected: 05/25/25 0457    Specimen: Blood from Arm, Left Updated: 05/25/25 0503    Narrative:      The following orders were created for panel order CBC & Differential.  Procedure                               Abnormality         Status                     ---------                               -----------         ------                     CBC Auto Differential[[id number]]        Abnormal            Final result                 Please view  results for these tests on the individual orders.    CBC Auto Differential [[id number]]  (Abnormal) Collected: 05/25/25 0457    Specimen: Blood from Arm, Left Updated: 05/25/25 0504     WBC 6.77 10*3/mm3      RBC 5.01 10*6/mm3      Hemoglobin 16.8 g/dL      Hematocrit 48.7 %      MCV 97.2 fL      MCH 33.5 pg      MCHC 34.5 g/dL      RDW 11.0 %      RDW-SD 39.6 fl      MPV 10.1 fL      Platelets 213 10*3/mm3      Neutrophil % 54.4 %      Lymphocyte % 33.4 %      Monocyte % 9.6 %      Eosinophil % 1.9 %      Basophil % 0.6 %      Immature Grans % 0.1 %      Neutrophils, Absolute 3.68 10*3/mm3      Lymphocytes, Absolute 2.26 10*3/mm3      Monocytes, Absolute 0.65 10*3/mm3      Eosinophils, Absolute 0.13 10*3/mm3      Basophils, Absolute 0.04 10*3/mm3      Immature Grans, Absolute 0.01 10*3/mm3      nRBC 0.0 /100 WBC     Lupus Anticoagulant [[id number]] Collected: 05/23/25 0928    Specimen: Blood from Arm, Right Updated: 05/24/25 1909     Dilute Prothrombin Time(dPT) 34.2 sec      dPT Confirm Ratio 0.96 Ratio      Thrombin Time 20.2 sec      PTT Lupus Anticoagulant 33.5 sec      Dilute Viper Venom Time 37.2 sec      Lupus Anticoagulant Reflex Comment:     Comment: No lupus anticoagulant was detected.       Narrative:      Performed at:  [address] [place] [address]  [id number]  : Alyssa Rodriguez MD, Phone:  [phone]    Cardiolipin Antibody [[id number]] Collected: 05/23/25 0928    Specimen: Blood from Arm, Right Updated: 05/24/25 1611     Anticardiolipin IgG <9 GPL U/mL      Comment:                           Negative:              <15                            Indeterminate:     15 - 20                            Low-Med Positive: >20 - 80                            High Positive:         >80        Anticardiolipin IgM <9 MPL U/mL      Comment:                           Negative:              <13                            Indeterminate:     13 - 20                             Low-Med Positive: >20 - 80                            High Positive:         >80        Anticardiolipin IgA <9 APL U/mL      Comment:                           Negative:              <12                            Indeterminate:     12 - 20                            Low-Med Positive: >20 - 80                            High Positive:         >80       Narrative:      Performed at:  [address] [place] [address]  [id number]  : Serjio Vidal PhD, Phone:  [phone]             Imaging Results (Last 24 Hours)       Procedure Component Value Units Date/Time    XR Abdomen KUB [[id number]] Collected: 05/24/25 1135     Updated: 05/24/25 1155    Narrative:      XR ABDOMEN KUB    Date of Exam: 5/24/2025 11:27 AM EDT    Indication: Constipation, abdominal pain    Comparison: CT abdomen and pelvis dated 5/22/2025    Findings:  The lung bases are clear. There is no organomegaly. There is a nonspecific nonobstructive small bowel gas pattern. There is mild gaseous distention of the colon. There is a mild colonic stool burden. No abnormal renal calcifications. There are   degenerative changes of the thoracolumbar spine.      Impression:      Impression:  1.Nonspecific nonobstructive small bowel gas pattern. Mild gaseous distention of the colon.  2.Mild colonic stool burden.        Electronically Signed: Gregg Shultz MD    5/24/2025 11:53 AM EDT    Workstation ID: [id number]                 I reviewed the patient's new clinical results.    Medication Review:   Scheduled Meds:enoxaparin sodium, 1 mg/kg, Subcutaneous, Q12H  sodium chloride, 10 mL, Intravenous, Q12H  [Held by provider] sucralfate, 1 g, Oral, 4x Daily  Vonoprazan Fumarate, 20 mg, Oral, Daily      Continuous Infusions:Pharmacy to Dose enoxaparin (LOVENOX),       PRN Meds:.  acetaminophen    senna-docusate sodium **AND** polyethylene glycol **AND** bisacodyl **AND** bisacodyl    HYDROmorphone    nitroglycerin    ondansetron ODT **OR**  ondansetron    oxyCODONE    Pharmacy to Dose enoxaparin (LOVENOX)    [COMPLETED] Insert Peripheral IV **AND** sodium chloride    sodium chloride    sodium chloride     Assessment & Plan       Pulmonary embolism    History of DVT (deep vein thrombosis)    Erosive esophagitis    KEVON on CPAP    Elevated lipase      -transition to Eliquis at discharge, continue Lovenox for acute PE  -denies any further chest pain  -bowel regiment for constipation. He has not had a bowel movement in 3-4 days. Abdominal pain this morning more consistent with constipation. KUB showed gas distension, no obstruction, mild colonic stool burden. Discussed with patient to decrease frequency of pain medication. Had three small bowel movements last night. Not as in much paint today as yesterday, but still uncomfortable. Will attempt another bowel regimen. Patient is hesitant to leave with the pain he is experiencing. Will defer imaging at this time. He has had multiple CT scans since admission. Physical exam is benign, vitals are stable, he is resting comfortably in the room. He believes the sucralfate is contributing and is asking to hold this for a few days.  -Voquezna for erosive esophagitis    CODE Status:    Code status (Patient has no pulse and is not breathing):  CPR (Attempt to Resuscitate)  Medical Interventions (Patient has pulse or is breathing):  Full support  Level of support discussed with:  Patient    Admission status:  I believe this patient meets inpatient status  Expected length of stay:  2 midnights or greater  I discussed the patient's findings and my recommendations with the patient.    Plan for disposition:Home    Renee Pena PA-C  05/25/25  11:55 EDT

## 2025-05-25 NOTE — PLAN OF CARE
Pt A&Ox4. On room air. Pt received prn med for pain. Pt pain controlled during the night and pt much less anxious after receiving pain med at beginning of shift. Pt still constipated but reported some small hard stool was passed.   Problem: Adult Inpatient Plan of Care  Goal: Plan of Care Review  Outcome: Progressing  Goal: Patient-Specific Goal (Individualized)  Outcome: Progressing  Goal: Absence of Hospital-Acquired Illness or Injury  Outcome: Progressing  Intervention: Identify and Manage Fall Risk  Intervention: Prevent Skin Injury  Intervention: Prevent and Manage VTE (Venous Thromboembolism) Risk  Intervention: Prevent Infection  Goal: Optimal Comfort and Wellbeing  Outcome: Progressing  Intervention: Monitor Pain and Promote Comfort  Intervention: Provide Person-Centered Care  Goal: Readiness for Transition of Care  Outcome: Progressing  Goal: Plan of Care Review  Outcome: Progressing  Goal: Patient-Specific Goal (Individualized)  Outcome: Progressing  Goal: Absence of Hospital-Acquired Illness or Injury  Outcome: Progressing  Intervention: Identify and Manage Fall Risk  Intervention: Prevent Skin Injury  Intervention: Prevent and Manage VTE (Venous Thromboembolism) Risk  Intervention: Prevent Infection  Goal: Optimal Comfort and Wellbeing  Outcome: Progressing  Intervention: Monitor Pain and Promote Comfort  Intervention: Provide Person-Centered Care  Goal: Readiness for Transition of Care  Outcome: Progressing  Goal: Plan of Care Review  Outcome: Progressing  Goal: Patient-Specific Goal (Individualized)  Outcome: Progressing  Goal: Absence of Hospital-Acquired Illness or Injury  Outcome: Progressing  Intervention: Identify and Manage Fall Risk  Intervention: Prevent Skin Injury  Intervention: Prevent and Manage VTE (Venous Thromboembolism) Risk  Intervention: Prevent Infection  Goal: Optimal Comfort and Wellbeing  Outcome: Progressing  Intervention: Monitor Pain and Promote Comfort  Intervention: Provide  Person-Centered Care  Goal: Readiness for Transition of Care  Outcome: Progressing   Goal Outcome Evaluation:

## 2025-05-25 NOTE — PLAN OF CARE
Goal Outcome Evaluation:  Plan of Care Reviewed With: patient        Progress: improving  Outcome Evaluation: Patient AOx4, adlib on RA. Started on PRN bowel regimen. COmplained of pain and nausea within the shift, resolved with PRN meds. Patient noted episodes of teary eyed and anxiety. Encourage verbalization of discomfor. Call light within reach

## 2025-05-26 ENCOUNTER — READMISSION MANAGEMENT (OUTPATIENT)
Dept: CALL CENTER | Facility: HOSPITAL | Age: 45
End: 2025-05-26
Payer: COMMERCIAL

## 2025-05-26 VITALS
BODY MASS INDEX: 31.59 KG/M2 | HEIGHT: 70 IN | WEIGHT: 220.68 LBS | TEMPERATURE: 97.9 F | RESPIRATION RATE: 25 BRPM | SYSTOLIC BLOOD PRESSURE: 121 MMHG | HEART RATE: 67 BPM | DIASTOLIC BLOOD PRESSURE: 65 MMHG | OXYGEN SATURATION: 97 %

## 2025-05-26 LAB
ANION GAP SERPL CALCULATED.3IONS-SCNC: 10.1 MMOL/L (ref 5–15)
BASOPHILS # BLD AUTO: 0.05 10*3/MM3 (ref 0–0.2)
BASOPHILS NFR BLD AUTO: 0.7 % (ref 0–1.5)
BUN SERPL-MCNC: 14 MG/DL (ref 6–20)
BUN/CREAT SERPL: 13.3 (ref 7–25)
CALCIUM SPEC-SCNC: 9.7 MG/DL (ref 8.6–10.5)
CHLORIDE SERPL-SCNC: 103 MMOL/L (ref 98–107)
CO2 SERPL-SCNC: 26.9 MMOL/L (ref 22–29)
CREAT SERPL-MCNC: 1.05 MG/DL (ref 0.76–1.27)
DEPRECATED RDW RBC AUTO: 38.9 FL (ref 37–54)
EGFRCR SERPLBLD CKD-EPI 2021: 90.3 ML/MIN/1.73
EOSINOPHIL # BLD AUTO: 0.17 10*3/MM3 (ref 0–0.4)
EOSINOPHIL NFR BLD AUTO: 2.4 % (ref 0.3–6.2)
ERYTHROCYTE [DISTWIDTH] IN BLOOD BY AUTOMATED COUNT: 10.8 % (ref 12.3–15.4)
GLUCOSE SERPL-MCNC: 103 MG/DL (ref 65–99)
HCT VFR BLD AUTO: 46.6 % (ref 37.5–51)
HGB BLD-MCNC: 16 G/DL (ref 13–17.7)
IMM GRANULOCYTES # BLD AUTO: 0.02 10*3/MM3 (ref 0–0.05)
IMM GRANULOCYTES NFR BLD AUTO: 0.3 % (ref 0–0.5)
LYMPHOCYTES # BLD AUTO: 2.93 10*3/MM3 (ref 0.7–3.1)
LYMPHOCYTES NFR BLD AUTO: 41.4 % (ref 19.6–45.3)
MCH RBC QN AUTO: 33.1 PG (ref 26.6–33)
MCHC RBC AUTO-ENTMCNC: 34.3 G/DL (ref 31.5–35.7)
MCV RBC AUTO: 96.3 FL (ref 79–97)
MONOCYTES # BLD AUTO: 0.67 10*3/MM3 (ref 0.1–0.9)
MONOCYTES NFR BLD AUTO: 9.5 % (ref 5–12)
NEUTROPHILS NFR BLD AUTO: 3.23 10*3/MM3 (ref 1.7–7)
NEUTROPHILS NFR BLD AUTO: 45.7 % (ref 42.7–76)
NRBC BLD AUTO-RTO: 0 /100 WBC (ref 0–0.2)
PLATELET # BLD AUTO: 209 10*3/MM3 (ref 140–450)
PMV BLD AUTO: 10.3 FL (ref 6–12)
POTASSIUM SERPL-SCNC: 4 MMOL/L (ref 3.5–5.2)
RBC # BLD AUTO: 4.84 10*6/MM3 (ref 4.14–5.8)
SODIUM SERPL-SCNC: 140 MMOL/L (ref 136–145)
WBC NRBC COR # BLD AUTO: 7.07 10*3/MM3 (ref 3.4–10.8)

## 2025-05-26 PROCEDURE — 25010000002 ENOXAPARIN PER 10 MG

## 2025-05-26 PROCEDURE — 85025 COMPLETE CBC W/AUTO DIFF WBC: CPT

## 2025-05-26 PROCEDURE — 99232 SBSQ HOSP IP/OBS MODERATE 35: CPT | Performed by: INTERNAL MEDICINE

## 2025-05-26 PROCEDURE — 80048 BASIC METABOLIC PNL TOTAL CA: CPT

## 2025-05-26 PROCEDURE — 96372 THER/PROPH/DIAG INJ SC/IM: CPT

## 2025-05-26 RX ADMIN — ENOXAPARIN SODIUM 105 MG: 150 INJECTION SUBCUTANEOUS at 08:39

## 2025-05-26 RX ADMIN — OXYCODONE 5 MG: 5 TABLET ORAL at 07:40

## 2025-05-26 RX ADMIN — OXYCODONE 5 MG: 5 TABLET ORAL at 11:47

## 2025-05-26 RX ADMIN — Medication 10 ML: at 08:40

## 2025-05-26 NOTE — PLAN OF CARE
Problem: Adult Inpatient Plan of Care  Goal: Absence of Hospital-Acquired Illness or Injury  Intervention: Prevent Skin Injury  Recent Flowsheet Documentation  Taken 5/25/2025 2018 by Chip Garcia, RN  Body Position: position changed independently   Goal Outcome Evaluation:

## 2025-05-26 NOTE — DISCHARGE SUMMARY
Date of Discharge:  5/26/2025    Discharge Diagnosis: Pulmonary Embolism    Presenting Problem/History of Present Illness  Active Hospital Problems    Diagnosis  POA    **Pulmonary embolism [I26.99]  Yes    History of DVT (deep vein thrombosis) [Z86.718]  Not Applicable    Erosive esophagitis [K22.10]  Yes    KEVON on CPAP [G47.33]  Yes    Elevated lipase [R74.8]  Yes      Resolved Hospital Problems   No resolved problems to display.          Hospital Course  Pleasant 45 y.o. male with history of DVT and erosive esophagitis presented with right sided abdominal pain. Was initially worked up at [facility] where CT abd/pelvis without contrast did not show any acute process. RUQ ultrasound was also normal. Symptoms were persistent and upon presentation here his CXR was clear but CT PE revealed a Acute PE  in the segmental and segmental pulmonary arteries of the right lower lobe with no evidence of right heart strain. He does have a history of DVT in 02/2017. He was treated with Lovenox and consulted with hematology. He was transitioned to Eliquis upon discharge and advised to follow up with hematology in PCP in 1-2 weeks.    Procedures Performed         Consults:   Consults       Date and Time Order Name Status Description    5/22/2025 10:54 PM Inpatient Hematology & Oncology Consult Completed     5/17/2025  8:21 PM IP Consult to Cardiology Completed             Pertinent Test Results:    Lab Results (most recent)       Procedure Component Value Units Date/Time    Basic Metabolic Panel [[id number]]  (Abnormal) Collected: 05/26/25 0033    Specimen: Blood from Arm, Left Updated: 05/26/25 0104     Glucose 103 mg/dL      BUN 14 mg/dL      Creatinine 1.05 mg/dL      Sodium 140 mmol/L      Potassium 4.0 mmol/L      Chloride 103 mmol/L      CO2 26.9 mmol/L      Calcium 9.7 mg/dL      BUN/Creatinine Ratio 13.3     Anion Gap 10.1 mmol/L      eGFR 90.3 mL/min/1.73     Narrative:      GFR Categories in Chronic Kidney Disease (CKD)               GFR Category          GFR (mL/min/1.73)    Interpretation  G1                    90 or greater        Normal or high (1)  G2                    60-89                Mild decrease (1)  G3a                   45-59                Mild to moderate decrease  G3b                   30-44                Moderate to severe decrease  G4                    15-29                Severe decrease  G5                    14 or less           Kidney failure    (1)In the absence of evidence of kidney disease, neither GFR category G1 or G2 fulfill the criteria for CKD.    eGFR calculation 2021 CKD-EPI creatinine equation, which does not include race as a factor    CBC & Differential [[id number]]  (Abnormal) Collected: 05/26/25 0033    Specimen: Blood from Arm, Left Updated: 05/26/25 0042    Narrative:      The following orders were created for panel order CBC & Differential.  Procedure                               Abnormality         Status                     ---------                               -----------         ------                     CBC Auto Differential[[id number]]        Abnormal            Final result                 Please view results for these tests on the individual orders.    CBC Auto Differential [[id number]]  (Abnormal) Collected: 05/26/25 0033    Specimen: Blood from Arm, Left Updated: 05/26/25 0042     WBC 7.07 10*3/mm3      RBC 4.84 10*6/mm3      Hemoglobin 16.0 g/dL      Hematocrit 46.6 %      MCV 96.3 fL      MCH 33.1 pg      MCHC 34.3 g/dL      RDW 10.8 %      RDW-SD 38.9 fl      MPV 10.3 fL      Platelets 209 10*3/mm3      Neutrophil % 45.7 %      Lymphocyte % 41.4 %      Monocyte % 9.5 %      Eosinophil % 2.4 %      Basophil % 0.7 %      Immature Grans % 0.3 %      Neutrophils, Absolute 3.23 10*3/mm3      Lymphocytes, Absolute 2.93 10*3/mm3      Monocytes, Absolute 0.67 10*3/mm3      Eosinophils, Absolute 0.17 10*3/mm3      Basophils, Absolute 0.05 10*3/mm3      Immature Grans, Absolute 0.02  10*3/mm3      nRBC 0.0 /100 WBC     Basic Metabolic Panel [[id number]]  (Abnormal) Collected: 05/25/25 0457    Specimen: Blood from Arm, Left Updated: 05/25/25 0530     Glucose 100 mg/dL      BUN 11 mg/dL      Creatinine 1.06 mg/dL      Sodium 140 mmol/L      Potassium 4.3 mmol/L      Chloride 103 mmol/L      CO2 26.7 mmol/L      Calcium 9.8 mg/dL      BUN/Creatinine Ratio 10.4     Anion Gap 10.3 mmol/L      eGFR 89.3 mL/min/1.73     Narrative:      GFR Categories in Chronic Kidney Disease (CKD)              GFR Category          GFR (mL/min/1.73)    Interpretation  G1                    90 or greater        Normal or high (1)  G2                    60-89                Mild decrease (1)  G3a                   45-59                Mild to moderate decrease  G3b                   30-44                Moderate to severe decrease  G4                    15-29                Severe decrease  G5                    14 or less           Kidney failure    (1)In the absence of evidence of kidney disease, neither GFR category G1 or G2 fulfill the criteria for CKD.    eGFR calculation 2021 CKD-EPI creatinine equation, which does not include race as a factor    CBC & Differential [[id number]]  (Abnormal) Collected: 05/25/25 0457    Specimen: Blood from Arm, Left Updated: 05/25/25 0504    Narrative:      The following orders were created for panel order CBC & Differential.  Procedure                               Abnormality         Status                     ---------                               -----------         ------                     CBC Auto Differential[[id number]]        Abnormal            Final result                 Please view results for these tests on the individual orders.    CBC Auto Differential [[id number]]  (Abnormal) Collected: 05/25/25 0457    Specimen: Blood from Arm, Left Updated: 05/25/25 0504     WBC 6.77 10*3/mm3      RBC 5.01 10*6/mm3      Hemoglobin 16.8 g/dL      Hematocrit 48.7 %      MCV 97.2 fL       MCH 33.5 pg      MCHC 34.5 g/dL      RDW 11.0 %      RDW-SD 39.6 fl      MPV 10.1 fL      Platelets 213 10*3/mm3      Neutrophil % 54.4 %      Lymphocyte % 33.4 %      Monocyte % 9.6 %      Eosinophil % 1.9 %      Basophil % 0.6 %      Immature Grans % 0.1 %      Neutrophils, Absolute 3.68 10*3/mm3      Lymphocytes, Absolute 2.26 10*3/mm3      Monocytes, Absolute 0.65 10*3/mm3      Eosinophils, Absolute 0.13 10*3/mm3      Basophils, Absolute 0.04 10*3/mm3      Immature Grans, Absolute 0.01 10*3/mm3      nRBC 0.0 /100 WBC     Lupus Anticoagulant [[id number]] Collected: 05/23/25 0928    Specimen: Blood from Arm, Right Updated: 05/24/25 1909     Dilute Prothrombin Time(dPT) 34.2 sec      dPT Confirm Ratio 0.96 Ratio      Thrombin Time 20.2 sec      PTT Lupus Anticoagulant 33.5 sec      Dilute Viper Venom Time 37.2 sec      Lupus Anticoagulant Reflex Comment:     Comment: No lupus anticoagulant was detected.       Narrative:      Performed at:  [address] [place] [address]  [id number]  : Alyssa Rodriguez MD, Phone:  [phone]    Cardiolipin Antibody [[id number]] Collected: 05/23/25 0928    Specimen: Blood from Arm, Right Updated: 05/24/25 1611     Anticardiolipin IgG <9 GPL U/mL      Comment:                           Negative:              <15                            Indeterminate:     15 - 20                            Low-Med Positive: >20 - 80                            High Positive:         >80        Anticardiolipin IgM <9 MPL U/mL      Comment:                           Negative:              <13                            Indeterminate:     13 - 20                            Low-Med Positive: >20 - 80                            High Positive:         >80        Anticardiolipin IgA <9 APL U/mL      Comment:                           Negative:              <12                            Indeterminate:     12 - 20                            Low-Med Positive: >20 - 80                             High Positive:         >80       Narrative:      Performed at:  [address]  [id number]  : Serjio Vidal PhD, Phone:  [phone]    Beta-2 Glycoprotein Antibodies [[id number]] Collected: 05/23/25 0928    Specimen: Blood from Arm, Right Updated: 05/23/25 0938    Lipase [[id number]]  (Normal) Collected: 05/23/25 0423    Specimen: Blood from Arm, Right Updated: 05/23/25 0500     Lipase 33 U/L     Amylase [[id number]]  (Normal) Collected: 05/23/25 0423    Specimen: Blood from Arm, Right Updated: 05/23/25 0500     Amylase 73 U/L     Sedimentation Rate [[id number]]  (Normal) Collected: 05/22/25 1642    Specimen: Blood Updated: 05/22/25 1835     Sed Rate 9 mm/hr     High Sensitivity Troponin T 1Hr [[id number]] Collected: 05/22/25 1805    Specimen: Blood from Arm, Right Updated: 05/22/25 1833     HS Troponin T <6 ng/L      Troponin T Numeric Delta --     Comment: Unable to calculate.       Narrative:      High Sensitive Troponin T Reference Range:  <14.0 ng/L- Negative Female for AMI  <22.0 ng/L- Negative Male for AMI  >=14 - Abnormal Female indicating possible myocardial injury.  >=22 - Abnormal Male indicating possible myocardial injury.   Clinicians would have to utilize clinical acumen, EKG, Troponin, and serial changes to determine if it is an Acute Myocardial Infarction or myocardial injury due to an underlying chronic condition.         Comprehensive Metabolic Panel [[id number]]  (Abnormal) Collected: 05/22/25 1642    Specimen: Blood Updated: 05/22/25 1753     Glucose 98 mg/dL      BUN 15 mg/dL      Creatinine 1.10 mg/dL      Sodium 138 mmol/L      Potassium 4.1 mmol/L      Chloride 99 mmol/L      CO2 21.8 mmol/L      Calcium 10.0 mg/dL      Total Protein 7.5 g/dL      Albumin 4.8 g/dL      ALT (SGPT) 21 U/L      AST (SGOT) 26 U/L      Alkaline Phosphatase 51 U/L      Total Bilirubin 1.9 mg/dL      Globulin 2.7 gm/dL      A/G Ratio 1.8 g/dL       BUN/Creatinine Ratio 13.6     Anion Gap 17.2 mmol/L      eGFR 85.4 mL/min/1.73     Narrative:      GFR Categories in Chronic Kidney Disease (CKD)              GFR Category          GFR (mL/min/1.73)    Interpretation  G1                    90 or greater        Normal or high (1)  G2                    60-89                Mild decrease (1)  G3a                   45-59                Mild to moderate decrease  G3b                   30-44                Moderate to severe decrease  G4                    15-29                Severe decrease  G5                    14 or less           Kidney failure    (1)In the absence of evidence of kidney disease, neither GFR category G1 or G2 fulfill the criteria for CKD.    eGFR calculation 2021 CKD-EPI creatinine equation, which does not include race as a factor    High Sensitivity Troponin T [[id number]]  (Normal) Collected: 05/22/25 1642    Specimen: Blood Updated: 05/22/25 1753     HS Troponin T <6 ng/L     Narrative:      High Sensitive Troponin T Reference Range:  <14.0 ng/L- Negative Female for AMI  <22.0 ng/L- Negative Male for AMI  >=14 - Abnormal Female indicating possible myocardial injury.  >=22 - Abnormal Male indicating possible myocardial injury.   Clinicians would have to utilize clinical acumen, EKG, Troponin, and serial changes to determine if it is an Acute Myocardial Infarction or myocardial injury due to an underlying chronic condition.         Lipase [[id number]]  (Abnormal) Collected: 05/22/25 1642    Specimen: Blood Updated: 05/22/25 1714     Lipase 62 U/L     Extra Tubes [[id number]] Collected: 05/22/25 1642    Specimen: Blood Updated: 05/22/25 1700    Narrative:      The following orders were created for panel order Extra Tubes.  Procedure                               Abnormality         Status                     ---------                               -----------         ------                     Lavender Top[[id number]]                                      Final result               Gold Top - SST[[id number]]                                   Final result               Light Blue Top[[id number]]                                   Final result                 Please view results for these tests on the individual orders.    Lavender Top [[id number]] Collected: 05/22/25 1642    Specimen: Blood Updated: 05/22/25 1700     Extra Tube hold for add-on     Comment: Auto resulted       Gold Top - SST [[id number]] Collected: 05/22/25 1642    Specimen: Blood Updated: 05/22/25 1700     Extra Tube Hold for add-ons.     Comment: Auto resulted.       Light Blue Top [[id number]] Collected: 05/22/25 1642    Specimen: Blood Updated: 05/22/25 1700     Extra Tube Hold for add-ons.     Comment: Auto resulted                Results for orders placed during the hospital encounter of 05/22/25    Adult Transthoracic Echo Complete W/ Cont if Necessary Per Protocol    Interpretation Summary    Left ventricular ejection fraction appears to be 61 - 65%.    Left ventricular diastolic function was normal.    The right ventricular cavity is borderline dilated.              Condition on Discharge:  Stable    Vital Signs  Temp:  [97.7 °F (36.5 °C)-98.2 °F (36.8 °C)] 97.9 °F (36.6 °C)  Heart Rate:  [63-85] 67  Resp:  [14-25] 25  BP: (117-128)/(64-80) 121/65    Physical Exam:     General Appearance:    Alert, cooperative, in no acute distress   Head:    Normocephalic, without obvious abnormality, atraumatic   Eyes:            Lids and lashes normal, conjunctivae and sclerae normal, no   icterus, no pallor, corneas clear   Ears:    Ears appear intact with no abnormalities noted   Throat:   No oral lesions, no thrush, oral mucosa moist   Neck:   No adenopathy, supple, trachea midline, no thyromegaly, no   carotid bruit, no JVD   Lungs:     Clear to auscultation,respirations regular, even and                unlabored    Heart:    Regular rhythm and normal rate, normal S1 and S2, no          murmur, no gallop, no  rub, no click   Chest Wall:    No abnormalities observed   Abdomen:     Normal bowel sounds, no masses, no organomegaly, soft      non-tender, non-distended, no guarding, no rebound tenderness   Extremities:   Moves all extremities well, no edema, no cyanosis, no redness   Pulses:   Pulses palpable and equal bilaterally   Skin:   No bleeding, bruising or rash   Lymph nodes:   No palpable adenopathy           Discharge Disposition  Home or Self Care    Discharge Medications     Discharge Medications        New Medications        Instructions Start Date   Eliquis 5 MG tablet tablet  Generic drug: apixaban   Take 2 tablets by mouth 2 (Two) Times a Day for 7 days, THEN 1 tablet 2 (Two) Times a Day for 21 days.   Start Date: May 23, 2025            Continue These Medications        Instructions Start Date   ondansetron ODT 4 MG disintegrating tablet  Commonly known as: ZOFRAN-ODT   4 mg, Translingual, Every 8 Hours PRN      sucralfate 1 g tablet  Commonly known as: CARAFATE   1 g, 4 Times Daily      Voquezna 20 MG tablet  Generic drug: Vonoprazan Fumarate   20 mg, Daily               Discharge Diet:   Diet Instructions       Diet: Regular/House Diet; Regular (IDDSI 7); Thin (IDDSI 0)      Discharge Diet: Regular/House Diet    Texture: Regular (IDDSI 7)    Fluid Consistency: Thin (IDDSI 0)            Activity at Discharge:   Activity Instructions       Activity as Tolerated              Follow-up Appointments  No future appointments.  Additional Instructions for the Follow-ups that You Need to Schedule       Discharge Follow-up with PCP   As directed       Currently Documented PCP:    Luan Gomes MD    PCP Phone Number:    [phone]     Follow Up Details: 1-2 weeks        Discharge Follow-up with Specialty: hematology   As directed      Specialty: hematology                Test Results Pending at Discharge  Pending Results       Procedure [Order ID] Specimen - Date/Time    Beta-2 Glycoprotein Antibodies [[id number]]  Collected: 05/23/25 0928    Specimen: Blood from Arm, Right Updated: 05/23/25 0938             Renee Pena PA-C  05/26/25  12:07 EDT    Time: Discharge 25 min

## 2025-05-26 NOTE — PLAN OF CARE
Goal Outcome Evaluation:   Pt discharged to home with spouse. Belongings sent with pt. Medication returned to pt from unit. Pt aware to call to follow-up with hematology in 4-6 weeks per Dr. Murdock. Discharge instruction provided. Offered wheelchair to transport to main lobby, pt declined.

## 2025-05-26 NOTE — PROGRESS NOTES
Hematology/Oncology Inpatient Progress Note    PATIENT NAME: Jass Odell  : 1980  MRN: [id number]    CHIEF COMPLAINT: Chest pain.     HISTORY OF PRESENT ILLNESS:      2025: I was asked to see Mr. Odell who came in complaining of pain in the right lower chest. CT of the abdomen pelvis revealed no abnormalities. Subsequent to this he had an angiogram of the chest and it reported acute pulmonary emboli in the region of the right lower lobe segmental and subsegmental pulmonary arteries. There was no evidence of right heart strain. He also had a duplex scan of both lower extremities that revealed no evidence of deep vein thrombosis. He was started on anticoagulation. He gave a history of a deep vein thrombus that was diagnosed in 2017. He woke up 1 morning with pain in the left lower extremity. In the next 1 or 2 days there was progressive edema and he sought attention from an urgent care facility where he was treated with antibiotics with the suspicion of cellulitis. He sought attention from his primary care physician who did not obtained a Doppler. This confirmed a thrombus involving the entirety of the saphenous vein extending to the saphenofemoral junction and into the proximal femoral vein. He was placed on apixaban and took the medication for several months. He could not be any more specific and I did not find any additional information on the chart. At the time he underwent extensive testing for an inherited predisposition to thrombosis, including protein C and protein S activity as well as factor V Leiden and prothrombin gene mutation. All were negative. He also was tested for lupus anticoagulant, beta-2 glycoprotein antibodies and cardiolipin antibodies which were as well negative. Approximately 72 hours before his symptoms started this last time he was admitted to the hospital with upper gastrointestinal symptoms. He stayed in the hospital for approximately 48 hours. On exam he is a well-built  man who does not seem in distress. He is conversant and oriented. There is no jaundice. The lungs are diminished bilaterally and the heart regular. The abdomen is protuberant and soft. The liver and spleen are nonenlarged. There is no edema of the lower extremities. It is very possible that the recent admission to the hospital is the provocation for the present pulmonary embolism. However, it is a recurring process that started at a young age which, despite the negative results on the above testing confirms that Mr. Odell has an increased propensity to thrombosis. On this basis I believe that he should be treated with lifelong anticoagulation and apixaban, if it is a medication that his insurance covers, would be appropriate. I do not believe that much more testing is necessary with the exception of repeating lupus anticoagulant, anticardiolipin antibodies and antibeta-2 lipoprotein antibody. This because, if positive, it would be necessary to treat him with warfarin. Discussed at great length with him and with his spouse and answered questions.     Subjective   5/26/2025: Feeling progressively better. No chest pain. His constipation was resolved.     ROS:  Review of Systems   Constitutional:  Negative for activity change, appetite change, chills, diaphoresis, fatigue, fever and unexpected weight change.   HENT:  Negative for congestion, dental problem, drooling, ear discharge, ear pain, facial swelling, hearing loss, mouth sores, nosebleeds, postnasal drip, rhinorrhea, sinus pressure, sinus pain, sneezing, sore throat, tinnitus, trouble swallowing and voice change.    Eyes:  Negative for photophobia, pain, discharge, redness, itching and visual disturbance.   Respiratory:  Negative for apnea, cough, choking, chest tightness, shortness of breath, wheezing and stridor.    Cardiovascular:  Negative for chest pain, palpitations and leg swelling.   Gastrointestinal:  Negative for abdominal distention, abdominal pain,  "anal bleeding, blood in stool, constipation, diarrhea, nausea, rectal pain and vomiting.   Endocrine: Negative for cold intolerance, heat intolerance, polydipsia and polyuria.   Genitourinary:  Negative for decreased urine volume, difficulty urinating, dysuria, flank pain, frequency, genital sores, hematuria and urgency.   Musculoskeletal:  Negative for arthralgias, back pain, gait problem, joint swelling, myalgias, neck pain and neck stiffness.   Skin:  Negative for color change, pallor and rash.   Neurological:  Negative for dizziness, tremors, seizures, syncope, facial asymmetry, speech difficulty, weakness, light-headedness, numbness and headaches.   Hematological:  Negative for adenopathy. Does not bruise/bleed easily.   Psychiatric/Behavioral:  Negative for agitation, behavioral problems, confusion, decreased concentration, hallucinations, self-injury, sleep disturbance and suicidal ideas. The patient is not nervous/anxious.         MEDICATIONS:    Scheduled Meds:  enoxaparin sodium, 1 mg/kg, Subcutaneous, Q12H  sodium chloride, 10 mL, Intravenous, Q12H  [Held by provider] sucralfate, 1 g, Oral, 4x Daily  Vonoprazan Fumarate, 20 mg, Oral, Daily       Continuous Infusions:  Pharmacy to Dose enoxaparin (LOVENOX),        PRN Meds:    acetaminophen    senna-docusate sodium **AND** polyethylene glycol **AND** bisacodyl **AND** bisacodyl    HYDROmorphone    nitroglycerin    ondansetron ODT **OR** ondansetron    oxyCODONE    Pharmacy to Dose enoxaparin (LOVENOX)    [COMPLETED] Insert Peripheral IV **AND** sodium chloride    sodium chloride    sodium chloride     ALLERGIES:    Allergies   Allergen Reactions    Prednisone GI Bleeding     PO prednisone causes GI bleeding per patient       Objective    VITALS:   /65 (BP Location: Left arm, Patient Position: Lying)   Pulse 67   Temp 97.9 °F (36.6 °C) (Oral)   Resp 25   Ht 177.8 cm (70\")   Wt 100 kg (220 lb 10.9 oz)   SpO2 97%   BMI 31.66 kg/m²     PHYSICAL " EXAM: (performed by MD)  Physical Exam  Constitutional:       General: He is not in acute distress.     Appearance: He is ill-appearing. He is not toxic-appearing or diaphoretic.   HENT:      Head: Normocephalic and atraumatic.      Right Ear: External ear normal.      Left Ear: External ear normal.      Nose: Nose normal.      Mouth/Throat:      Mouth: Mucous membranes are moist.      Pharynx: Oropharynx is clear.   Eyes:      General: No scleral icterus.        Right eye: No discharge.         Left eye: No discharge.      Conjunctiva/sclera: Conjunctivae normal.      Pupils: Pupils are equal, round, and reactive to light.   Cardiovascular:      Rate and Rhythm: Normal rate and regular rhythm.      Pulses: Normal pulses.      Heart sounds: Normal heart sounds. No murmur heard.     No friction rub. No gallop.   Pulmonary:      Effort: No respiratory distress.      Breath sounds: No stridor. No wheezing, rhonchi or rales.   Chest:      Chest wall: No tenderness.   Abdominal:      General: Abdomen is flat. Bowel sounds are normal. There is no distension.      Palpations: Abdomen is soft. There is no mass.      Tenderness: There is no abdominal tenderness. There is no right CVA tenderness, left CVA tenderness, guarding or rebound.   Musculoskeletal:         General: No tenderness, deformity or signs of injury.      Cervical back: No rigidity.      Right lower leg: No edema.      Left lower leg: No edema.   Lymphadenopathy:      Cervical: No cervical adenopathy.   Skin:     General: Skin is warm and dry.      Coloration: Skin is not jaundiced or pale.      Findings: No bruising or rash.   Neurological:      General: No focal deficit present.      Mental Status: He is alert and oriented to person, place, and time.      Cranial Nerves: No cranial nerve deficit.   Psychiatric:         Mood and Affect: Mood normal.         Behavior: Behavior normal.         Thought Content: Thought content normal.         Judgment: Judgment  normal.     I Ciro Murdock MD performed the physical exam on 5/26/2025 as documented above.       RECENT LABS:  Lab Results (last 24 hours)       Procedure Component Value Units Date/Time    Basic Metabolic Panel [[id number]]  (Abnormal) Collected: 05/26/25 0033    Specimen: Blood from Arm, Left Updated: 05/26/25 0104     Glucose 103 mg/dL      BUN 14 mg/dL      Creatinine 1.05 mg/dL      Sodium 140 mmol/L      Potassium 4.0 mmol/L      Chloride 103 mmol/L      CO2 26.9 mmol/L      Calcium 9.7 mg/dL      BUN/Creatinine Ratio 13.3     Anion Gap 10.1 mmol/L      eGFR 90.3 mL/min/1.73     Narrative:      GFR Categories in Chronic Kidney Disease (CKD)              GFR Category          GFR (mL/min/1.73)    Interpretation  G1                    90 or greater        Normal or high (1)  G2                    60-89                Mild decrease (1)  G3a                   45-59                Mild to moderate decrease  G3b                   30-44                Moderate to severe decrease  G4                    15-29                Severe decrease  G5                    14 or less           Kidney failure    (1)In the absence of evidence of kidney disease, neither GFR category G1 or G2 fulfill the criteria for CKD.    eGFR calculation 2021 CKD-EPI creatinine equation, which does not include race as a factor    CBC & Differential [[id number]]  (Abnormal) Collected: 05/26/25 0033    Specimen: Blood from Arm, Left Updated: 05/26/25 0042    Narrative:      The following orders were created for panel order CBC & Differential.  Procedure                               Abnormality         Status                     ---------                               -----------         ------                     CBC Auto Differential[[id number]]        Abnormal            Final result                 Please view results for these tests on the individual orders.    CBC Auto Differential [[id number]]  (Abnormal) Collected: 05/26/25 0033     Specimen: Blood from Arm, Left Updated: 05/26/25 0042     WBC 7.07 10*3/mm3      RBC 4.84 10*6/mm3      Hemoglobin 16.0 g/dL      Hematocrit 46.6 %      MCV 96.3 fL      MCH 33.1 pg      MCHC 34.3 g/dL      RDW 10.8 %      RDW-SD 38.9 fl      MPV 10.3 fL      Platelets 209 10*3/mm3      Neutrophil % 45.7 %      Lymphocyte % 41.4 %      Monocyte % 9.5 %      Eosinophil % 2.4 %      Basophil % 0.7 %      Immature Grans % 0.3 %      Neutrophils, Absolute 3.23 10*3/mm3      Lymphocytes, Absolute 2.93 10*3/mm3      Monocytes, Absolute 0.67 10*3/mm3      Eosinophils, Absolute 0.17 10*3/mm3      Basophils, Absolute 0.05 10*3/mm3      Immature Grans, Absolute 0.02 10*3/mm3      nRBC 0.0 /100 WBC           IMAGING REVIEWED:  No radiology results for the last day    Assessment & Plan   ASSESSMENT:  Pulmonary embolism: I have reviewed all the records. He was indeed seen by Dr. Madison in the past. Extensive investigations were reported negative. During this admission he was found to be negative for lupus anticoagulant or antiphospholipid antibodies. No intervention from me at this point. He can be discharged from my perspective.     PLAN:  As above.     Ciro Murdock MD on 5/26/2025 at 11:35 AM.

## 2025-05-26 NOTE — OUTREACH NOTE
Prep Survey      Flowsheet Row Responses   [facility] facility patient discharged from? [facility]   Is LACE score < 7 ? No   Eligibility Readm Mgmt   Discharge diagnosis Pulmonary embolism   Does the patient have one of the following disease processes/diagnoses(primary or secondary)? Other   Does the patient have Home health ordered? No   Is there a DME ordered? No   Prep survey completed? Yes            Fannie JAIN - Registered Nurse

## 2025-05-27 ENCOUNTER — APPOINTMENT (OUTPATIENT)
Dept: CT IMAGING | Facility: HOSPITAL | Age: 45
End: 2025-05-27
Payer: COMMERCIAL

## 2025-05-27 ENCOUNTER — HOSPITAL ENCOUNTER (EMERGENCY)
Facility: HOSPITAL | Age: 45
Discharge: HOME | End: 2025-05-27
Attending: EMERGENCY MEDICINE | Admitting: EMERGENCY MEDICINE
Payer: COMMERCIAL

## 2025-05-27 VITALS
HEART RATE: 89 BPM | OXYGEN SATURATION: 96 % | RESPIRATION RATE: 12 BRPM | HEIGHT: 70 IN | SYSTOLIC BLOOD PRESSURE: 128 MMHG | WEIGHT: 231.7 LBS | DIASTOLIC BLOOD PRESSURE: 68 MMHG | TEMPERATURE: 98.4 F | BODY MASS INDEX: 33.17 KG/M2

## 2025-05-27 DIAGNOSIS — Z86.711 HISTORY OF PULMONARY EMBOLUS (PE): Primary | ICD-10-CM

## 2025-05-27 DIAGNOSIS — F43.0 ACUTE STRESS REACTION: ICD-10-CM

## 2025-05-27 DIAGNOSIS — R09.1 PLEURISY: ICD-10-CM

## 2025-05-27 LAB
ALBUMIN SERPL-MCNC: 4.7 G/DL (ref 3.5–5.2)
ALBUMIN/GLOB SERPL: 1.8 G/DL
ALP SERPL-CCNC: 48 U/L (ref 39–117)
ALT SERPL W P-5'-P-CCNC: 75 U/L (ref 1–41)
ANION GAP SERPL CALCULATED.3IONS-SCNC: 14.6 MMOL/L (ref 5–15)
AST SERPL-CCNC: 49 U/L (ref 1–40)
B PARAPERT DNA SPEC QL NAA+PROBE: NOT DETECTED
B PERT DNA SPEC QL NAA+PROBE: NOT DETECTED
B2 GLYCOPROT1 IGA SER-ACNC: <9 GPI IGA UNITS (ref 0–25)
B2 GLYCOPROT1 IGG SER-ACNC: <9 GPI IGG UNITS (ref 0–20)
B2 GLYCOPROT1 IGM SER-ACNC: <9 GPI IGM UNITS (ref 0–32)
BASOPHILS # BLD AUTO: 0.03 10*3/MM3 (ref 0–0.2)
BASOPHILS NFR BLD AUTO: 0.3 % (ref 0–1.5)
BILIRUB SERPL-MCNC: 1.2 MG/DL (ref 0–1.2)
BUN SERPL-MCNC: 11.3 MG/DL (ref 6–20)
BUN/CREAT SERPL: 11.1 (ref 7–25)
C PNEUM DNA NPH QL NAA+NON-PROBE: NOT DETECTED
CALCIUM SPEC-SCNC: 9.9 MG/DL (ref 8.6–10.5)
CHLORIDE SERPL-SCNC: 101 MMOL/L (ref 98–107)
CO2 SERPL-SCNC: 24.4 MMOL/L (ref 22–29)
CREAT SERPL-MCNC: 1.02 MG/DL (ref 0.76–1.27)
D-LACTATE SERPL-SCNC: 0.6 MMOL/L (ref 0.3–2)
DEPRECATED RDW RBC AUTO: 38.5 FL (ref 37–54)
EGFRCR SERPLBLD CKD-EPI 2021: 93.5 ML/MIN/1.73
EOSINOPHIL # BLD AUTO: 0.03 10*3/MM3 (ref 0–0.4)
EOSINOPHIL NFR BLD AUTO: 0.3 % (ref 0.3–6.2)
ERYTHROCYTE [DISTWIDTH] IN BLOOD BY AUTOMATED COUNT: 10.7 % (ref 12.3–15.4)
FLUAV SUBTYP SPEC NAA+PROBE: NOT DETECTED
FLUBV RNA ISLT QL NAA+PROBE: NOT DETECTED
GEN 5 1HR TROPONIN T REFLEX: <6 NG/L
GLOBULIN UR ELPH-MCNC: 2.6 GM/DL
GLUCOSE SERPL-MCNC: 103 MG/DL (ref 65–99)
HADV DNA SPEC NAA+PROBE: NOT DETECTED
HCOV 229E RNA SPEC QL NAA+PROBE: NOT DETECTED
HCOV HKU1 RNA SPEC QL NAA+PROBE: NOT DETECTED
HCOV NL63 RNA SPEC QL NAA+PROBE: NOT DETECTED
HCOV OC43 RNA SPEC QL NAA+PROBE: NOT DETECTED
HCT VFR BLD AUTO: 48.2 % (ref 37.5–51)
HGB BLD-MCNC: 16.6 G/DL (ref 13–17.7)
HMPV RNA NPH QL NAA+NON-PROBE: NOT DETECTED
HPIV1 RNA ISLT QL NAA+PROBE: NOT DETECTED
HPIV2 RNA SPEC QL NAA+PROBE: NOT DETECTED
HPIV3 RNA NPH QL NAA+PROBE: NOT DETECTED
HPIV4 P GENE NPH QL NAA+PROBE: NOT DETECTED
IMM GRANULOCYTES # BLD AUTO: 0.03 10*3/MM3 (ref 0–0.05)
IMM GRANULOCYTES NFR BLD AUTO: 0.3 % (ref 0–0.5)
LYMPHOCYTES # BLD AUTO: 2.04 10*3/MM3 (ref 0.7–3.1)
LYMPHOCYTES NFR BLD AUTO: 22.8 % (ref 19.6–45.3)
M PNEUMO IGG SER IA-ACNC: NOT DETECTED
MCH RBC QN AUTO: 33.1 PG (ref 26.6–33)
MCHC RBC AUTO-ENTMCNC: 34.4 G/DL (ref 31.5–35.7)
MCV RBC AUTO: 96.2 FL (ref 79–97)
MONOCYTES # BLD AUTO: 0.77 10*3/MM3 (ref 0.1–0.9)
MONOCYTES NFR BLD AUTO: 8.6 % (ref 5–12)
NEUTROPHILS NFR BLD AUTO: 6.06 10*3/MM3 (ref 1.7–7)
NEUTROPHILS NFR BLD AUTO: 67.7 % (ref 42.7–76)
NRBC BLD AUTO-RTO: 0 /100 WBC (ref 0–0.2)
NT-PROBNP SERPL-MCNC: <36 PG/ML (ref 0–450)
PLATELET # BLD AUTO: 223 10*3/MM3 (ref 140–450)
PMV BLD AUTO: 10.4 FL (ref 6–12)
POTASSIUM SERPL-SCNC: 4.2 MMOL/L (ref 3.5–5.2)
PROT SERPL-MCNC: 7.3 G/DL (ref 6–8.5)
RBC # BLD AUTO: 5.01 10*6/MM3 (ref 4.14–5.8)
RHINOVIRUS RNA SPEC NAA+PROBE: NOT DETECTED
RSV RNA NPH QL NAA+NON-PROBE: NOT DETECTED
SARS-COV-2 RNA RESP QL NAA+PROBE: NOT DETECTED
SODIUM SERPL-SCNC: 140 MMOL/L (ref 136–145)
TROPONIN T NUMERIC DELTA: NORMAL
TROPONIN T SERPL HS-MCNC: 6 NG/L
WBC NRBC COR # BLD AUTO: 8.96 10*3/MM3 (ref 3.4–10.8)
WHOLE BLOOD HOLD COAG: NORMAL

## 2025-05-27 PROCEDURE — 83605 ASSAY OF LACTIC ACID: CPT

## 2025-05-27 PROCEDURE — 85025 COMPLETE CBC W/AUTO DIFF WBC: CPT | Performed by: EMERGENCY MEDICINE

## 2025-05-27 PROCEDURE — 87040 BLOOD CULTURE FOR BACTERIA: CPT | Performed by: EMERGENCY MEDICINE

## 2025-05-27 PROCEDURE — 36415 COLL VENOUS BLD VENIPUNCTURE: CPT

## 2025-05-27 PROCEDURE — 71275 CT ANGIOGRAPHY CHEST: CPT

## 2025-05-27 PROCEDURE — 83880 ASSAY OF NATRIURETIC PEPTIDE: CPT | Performed by: EMERGENCY MEDICINE

## 2025-05-27 PROCEDURE — 94761 N-INVAS EAR/PLS OXIMETRY MLT: CPT

## 2025-05-27 PROCEDURE — 94799 UNLISTED PULMONARY SVC/PX: CPT

## 2025-05-27 PROCEDURE — 96375 TX/PRO/DX INJ NEW DRUG ADDON: CPT

## 2025-05-27 PROCEDURE — 25510000001 IOPAMIDOL PER 1 ML: Performed by: EMERGENCY MEDICINE

## 2025-05-27 PROCEDURE — 25010000002 METHYLPREDNISOLONE PER 125 MG: Performed by: EMERGENCY MEDICINE

## 2025-05-27 PROCEDURE — 0202U NFCT DS 22 TRGT SARS-COV-2: CPT | Performed by: EMERGENCY MEDICINE

## 2025-05-27 PROCEDURE — 99285 EMERGENCY DEPT VISIT HI MDM: CPT

## 2025-05-27 PROCEDURE — 80053 COMPREHEN METABOLIC PANEL: CPT | Performed by: EMERGENCY MEDICINE

## 2025-05-27 PROCEDURE — 96374 THER/PROPH/DIAG INJ IV PUSH: CPT

## 2025-05-27 PROCEDURE — 94640 AIRWAY INHALATION TREATMENT: CPT

## 2025-05-27 PROCEDURE — 25010000002 DIAZEPAM PER 5 MG: Performed by: EMERGENCY MEDICINE

## 2025-05-27 PROCEDURE — 84484 ASSAY OF TROPONIN QUANT: CPT | Performed by: EMERGENCY MEDICINE

## 2025-05-27 PROCEDURE — 94664 DEMO&/EVAL PT USE INHALER: CPT

## 2025-05-27 RX ORDER — ALPRAZOLAM 0.5 MG
0.5 TABLET ORAL 3 TIMES DAILY PRN
Qty: 10 TABLET | Refills: 0 | Status: ON HOLD | OUTPATIENT
Start: 2025-05-27 | End: 2025-06-01

## 2025-05-27 RX ORDER — IOPAMIDOL 755 MG/ML
100 INJECTION, SOLUTION INTRAVASCULAR
Status: COMPLETED | OUTPATIENT
Start: 2025-05-27 | End: 2025-05-27

## 2025-05-27 RX ORDER — DIAZEPAM 10 MG/2ML
2.5 INJECTION, SOLUTION INTRAMUSCULAR; INTRAVENOUS EVERY 4 HOURS PRN
Status: DISCONTINUED | OUTPATIENT
Start: 2025-05-27 | End: 2025-05-27 | Stop reason: HOSPADM

## 2025-05-27 RX ORDER — AZITHROMYCIN 500 MG/1
500 TABLET, FILM COATED ORAL DAILY
Qty: 3 TABLET | Refills: 0 | Status: SHIPPED | OUTPATIENT
Start: 2025-05-27

## 2025-05-27 RX ORDER — METHYLPREDNISOLONE SODIUM SUCCINATE 125 MG/2ML
125 INJECTION, POWDER, LYOPHILIZED, FOR SOLUTION INTRAMUSCULAR; INTRAVENOUS ONCE
Status: COMPLETED | OUTPATIENT
Start: 2025-05-27 | End: 2025-05-27

## 2025-05-27 RX ORDER — IPRATROPIUM BROMIDE AND ALBUTEROL SULFATE 2.5; .5 MG/3ML; MG/3ML
3 SOLUTION RESPIRATORY (INHALATION) ONCE
Status: COMPLETED | OUTPATIENT
Start: 2025-05-27 | End: 2025-05-27

## 2025-05-27 RX ADMIN — IOPAMIDOL 100 ML: 755 INJECTION, SOLUTION INTRAVENOUS at 14:09

## 2025-05-27 RX ADMIN — IPRATROPIUM BROMIDE AND ALBUTEROL SULFATE 3 ML: .5; 3 SOLUTION RESPIRATORY (INHALATION) at 14:38

## 2025-05-27 RX ADMIN — DIAZEPAM 2.5 MG: 10 INJECTION, SOLUTION INTRAMUSCULAR; INTRAVENOUS at 13:55

## 2025-05-27 RX ADMIN — METHYLPREDNISOLONE SODIUM SUCCINATE 125 MG: 125 INJECTION, POWDER, FOR SOLUTION INTRAMUSCULAR; INTRAVENOUS at 13:41

## 2025-05-27 NOTE — DISCHARGE INSTRUCTIONS
Rest, no work or exertion, next 5 days  Continue Eliquis this is very important  Medication as directed  Follow-up with your primary care provider or return for worsening symptoms

## 2025-05-27 NOTE — PAYOR COMM NOTE
"This is inpatient admit submission for Mallorie Scott.   Observation to inpatient on 5/25/26.  Discharged on 5/26/25 routine home.     AUTHORIZATION PENDING:   PLEASE FAX OR CALL DETERMINATION TO CONTACT BELOW:   THANK YOU.      Cassandra Jordan, RN, [facility]  Utilization Nurse  [address] [place] [address]   [phone]  Fx [phone]     Mallorie Scott (45 y.o. Male)       Date of Birth   1980    Social Security Number       Address   [address] [place] [address] [place] [address]    Home Phone   [phone]    MRN   [id number]       Church   None    Marital Status                               Admission Date   5/22/2025    Admission Type   Emergency    Admitting Provider   Tessie Grewal MD    Attending Provider       Department, Room/Bed   [facility] PROGRESS CARE, 2118/1       Discharge Date   5/26/2025    Discharge Disposition   Home or Self Care    Discharge Destination                                 Attending Provider: (none)   Allergies: Prednisone    Isolation: None   Infection: None   Code Status: Prior    Ht: 177.8 cm (70\")   Wt: 100 kg (220 lb 10.9 oz)    Admission Cmt: None   Principal Problem: Pulmonary embolism [I26.99]                   Active Insurance as of 5/22/2025       Primary Coverage       Payor Plan Insurance Group Employer/Plan Group    [facility] [id number] [facility] [id number] BLUE [facility] PPO [id number]       Payor Plan Address Payor Plan Phone Number Payor Plan Fax Number Effective Dates    [address] [phone]  1/1/2018 - None Entered    [place] [address]         Subscriber Name Subscriber Birth Date Member ID       MALLORIE SCOTT 1980 [id number]                     Emergency Contacts        (Rel.) Home Phone Work Phone Mobile Phone    SERAFIN SCOTT (Spouse) -- -- [phone]                 History & Physical        Gillian Hughes, APRN at 05/22/25 2682       Attestation signed by Tessie Grewal MD at 05/26/25 0842  "     I have reviewed this documentation and agree.                          Patient Care Team:  Luan Gomes MD as PCP - General (Family Medicine)    Chief complaint right flank pain    Subjective    Patient is a 45 y.o. male with pmh of GERD, LLE DVT, KEVON on CPAP,  who presents with right sided abdominal pain- near flank area that is described as stabbing and cramping and worse when he eat that begain this am shortly after awakening. He does reports some intermittent shortness of breath. Has had nausea, but no vomiting, + chills, but no fever.  He denies any leg pain, swelling, periods of immobility.  He works at [employer] and spends 8-12 hours on his feet daily. Denies alcohol and nicotine use.    He originally presented to the [facility] ER where CT abd/pelvis without contrast did not show any acute process.  RUQ ultrasound was also normal.  WBC at that time was 8.29  He was discharged home but continued to feel very unwell and therefore presented to [facility] ER.  Lipase was elevated at 62, t. Bili 1.9.  WBC increased to 12.31 without anemia. Troponin neg x2.  Electrolytes were wNL but noted CO2 21.8 and anion gap increased to 17.2.  CXR was clear but CT PE revealed a Acute PE  in the segmental and segmental pulmonary arteries of the   right lower lobe. No evidence of right heart strain.  He was given a treatment dose of Lovenox.    He has had a long standing of GERD.  He was hospitalized on 5/17-5/18/2025 for epigastric pain and sinus bradycardia.  He was seen by GI who started him on Voquezna as he had poor success with PPI. Lipase was normal at that time. Last EGD 8/12/2024 showing grade B erosive esophagitis.  Cardiology was consulted who did not the sinus ary at 49 with left anterior fascicular block, but no other obvious changes with no further cardiac workup at that time.      Per chart review,  02/2017, he had a thrombus involving the entire greater saphenous vein without any flow.  Left Thrombus was  extending from the greater saphenous, common femur vein confluence with a small portion of thrombus within the common femoral vein without complete occlusion consistent with deep venous extension of the thrombus. He was started on Eliquis for 3 month minimum.  He saw Dr. Grubbs for hypercoagulable workup.  Patient did not recall every seeing a heme/oc at time of visit.    In 08/2024, He had a CT that was questional very small emboli within the subsegmental branches especially  in the right lower lobe, but thought it might have been artifact.  He was seen by pulmonary for hemoptysis at that time and treated for neck pain and GERD.  BLE venous dopplers were negative at that time.    Reports his grandmother had blood clots.    Review of Systems   Constitutional:  Positive for chills. Negative for fever.   Respiratory:  Positive for shortness of breath.    Cardiovascular:  Positive for chest pain. Negative for leg swelling.        Primarily along the right flank area   Gastrointestinal:  Positive for nausea. Negative for vomiting.          History  Past Medical History:   Diagnosis Date    Anxiety     GERD (gastroesophageal reflux disease)      Past Surgical History:   Procedure Laterality Date    ENDOSCOPY N/A 8/12/2024    Procedure: ESOPHAGOGASTRODUODENOSCOPY;  Surgeon: Oseas Pfeiffer MD;  Location: [facility] ENDOSCOPY;  Service: Gastroenterology;  Laterality: N/A;  POST-EROSIVE ESOPHAGITIS     History reviewed. No pertinent family history.  Social History     Tobacco Use    Smoking status: Former     Types: Cigars    Smokeless tobacco: Former     Types: Chew   Vaping Use    Vaping status: Never Used   Substance Use Topics    Alcohol use: Not Currently    Drug use: Not Currently     Types: Marijuana     Comment: occ     Medications Prior to Admission   Medication Sig Dispense Refill Last Dose/Taking    ondansetron ODT (ZOFRAN-ODT) 4 MG disintegrating tablet Place 1 tablet on the tongue Every 8 (Eight) Hours As  Needed for Nausea or Vomiting. 15 tablet 0 5/21/2025    sucralfate (CARAFATE) 1 g tablet Take 1 tablet by mouth 4 (Four) Times a Day.   5/22/2025 at 12:00 PM    Vonoprazan Fumarate (Voquezna) 20 MG tablet Take 1 tablet by mouth Daily.   5/22/2025     Allergies:  Prednisone    Objective    Vital Signs  Temp:  [97.8 °F (36.6 °C)-98.5 °F (36.9 °C)] 97.8 °F (36.6 °C)  Heart Rate:  [59-99] 59  Resp:  [11-22] 11  BP: (115-153)/(65-91) 121/75     Physical Exam:      General Appearance:    Alert, cooperative, in no acute distress   Head:    Normocephalic, without obvious abnormality, atraumatic   Eyes:            Lids and lashes normal, conjunctivae and sclerae normal, no   icterus, no pallor, corneas clear, PERRLA   Ears:    Ears appear intact with no abnormalities noted   Throat:   No oral lesions, no thrush, oral mucosa moist   Neck:   No adenopathy, supple, trachea midline, no thyromegaly, no   carotid bruit, no JVD   Lungs:     Clear to auscultation,respirations regular, even and                  unlabored    Heart:    Regular rhythm and normal rate, normal S1 and S2, no            murmur, no gallop, no rub, no click   Chest Wall:    No abnormalities observed   Abdomen:     Normal bowel sounds, no masses, no organomegaly, soft        tender, non-distended, no guarding, no rebound                tenderness   Extremities:   Moves all extremities well, no edema, no cyanosis, no             redness   Pulses:   Pulses palpable and equal bilaterally   Skin:   No bleeding, bruising or rash, clammy   Lymph nodes:   No palpable adenopathy   Neurologic:   Cranial nerves 2 - 12 grossly intact, sensation intact, DTR       present and equal bilaterally       Results Review:     Imaging Results (Last 24 Hours)       Procedure Component Value Units Date/Time    CT Angiogram Chest Pulmonary Embolism [[id number]] Collected: 05/22/25 1800     Updated: 05/22/25 1809    Narrative:      CT ANGIOGRAM CHEST PULMONARY EMBOLISM    Date of Exam:  5/22/2025 5:47 PM EDT    Indication: Right pleuritic chest pain.    Comparison: Chest radiograph 5/22/2025. Chest CT 8/10/2024.    Technique: Axial CT images were obtained of the chest after the uneventful intravenous administration of iodinated contrast utilizing pulmonary embolism protocol.  In addition, a 3-D volume rendered image was created for interpretation.  Sagittal and   coronal reconstructions were performed.  Automated exposure control and iterative reconstruction methods were used.      Findings:    Thyroid and thoracic inlet: No significant abnormality.    Lymph nodes: No pathologic appearing lymph nodes by imaging criteria.    Cardiovascular: Normal appearing heart size. No pericardial effusion. Aorta and main pulmonary artery diameters are within normal range.No coronary artery calcifications.. Acute pulmonary emboli in the segmental and segmental pulmonary arteries of the   right lower lobe. Suboptimal visualization of a few segmental and subsegmental pulmonary arteries secondary to motion artifacts. No CT evidence of right heart strain.    Esophagus: No significant abnormality.    Lung parenchyma: Scattered atelectasis. No suspicious appearing opacities.    Airways: Patent trachea and mainstem bronchi.    Pleura: No pleural effusion or pneumothorax.    Chest wall and osseous structures: Degenerative changes of the imaged spine. No acute osseous abnormality.    Included abdomen: No significant abnormality.      Impression:      Impression:  Acute pulmonary emboli in the right lower lobe segmental and subsegmental pulmonary arteries. No CT evidence of right heart strain.    Critical Findings were verbally communicated with Dr. Jacobo Vigil MD on 5/22/2025 at 6:08 p.m.        Electronically Signed: Fredi Vigil MD    5/22/2025 6:06 PM EDT    Workstation ID: [id number]    XR Ribs Right With PA Chest [[id number]] Collected: 05/22/25 1650     Updated: 05/22/25 1653    Narrative:      XR RIBS  RIGHT W PA CHEST    Date of Exam: 5/22/2025 4:43 PM EDT    Indication: right side chest wall pain    Comparison: AP chest 5/17/2025    Findings:  No acute displaced right rib fracture. No pleural effusion or pneumothorax. Clear lungs. Normal heart size.      Impression:      Impression:  No displaced right rib fracture. No acute chest finding.      Electronically Signed: Anne Marie Schwartz MD    5/22/2025 4:51 PM EDT    Workstation ID: [id number]             Lab Results (last 24 hours)       Procedure Component Value Units Date/Time    Sedimentation Rate [[id number]]  (Normal) Collected: 05/22/25 1642    Specimen: Blood Updated: 05/22/25 1835     Sed Rate 9 mm/hr     High Sensitivity Troponin T 1Hr [[id number]] Collected: 05/22/25 1805    Specimen: Blood from Arm, Right Updated: 05/22/25 1833     HS Troponin T <6 ng/L      Troponin T Numeric Delta --     Comment: Unable to calculate.       Narrative:      High Sensitive Troponin T Reference Range:  <14.0 ng/L- Negative Female for AMI  <22.0 ng/L- Negative Male for AMI  >=14 - Abnormal Female indicating possible myocardial injury.  >=22 - Abnormal Male indicating possible myocardial injury.   Clinicians would have to utilize clinical acumen, EKG, Troponin, and serial changes to determine if it is an Acute Myocardial Infarction or myocardial injury due to an underlying chronic condition.         CBC & Differential [[id number]]  (Abnormal) Collected: 05/22/25 1642    Specimen: Blood Updated: 05/22/25 1828    Narrative:      The following orders were created for panel order CBC & Differential.  Procedure                               Abnormality         Status                     ---------                               -----------         ------                     CBC Auto Differential[[id number]]        Abnormal            Final result                 Please view results for these tests on the individual orders.    CBC Auto Differential [[id number]]  (Abnormal) Collected:  05/22/25 1642    Specimen: Blood Updated: 05/22/25 1828     WBC 12.31 10*3/mm3      RBC 5.05 10*6/mm3      Hemoglobin 16.6 g/dL      Hematocrit 48.7 %      MCV 96.4 fL      MCH 32.9 pg      MCHC 34.1 g/dL      RDW 10.8 %      RDW-SD 38.8 fl      MPV 10.5 fL      Platelets 223 10*3/mm3      Neutrophil % 68.6 %      Lymphocyte % 20.9 %      Monocyte % 9.4 %      Eosinophil % 0.6 %      Basophil % 0.2 %      Immature Grans % 0.3 %      Neutrophils, Absolute 8.44 10*3/mm3      Lymphocytes, Absolute 2.57 10*3/mm3      Monocytes, Absolute 1.16 10*3/mm3      Eosinophils, Absolute 0.07 10*3/mm3      Basophils, Absolute 0.03 10*3/mm3      Immature Grans, Absolute 0.04 10*3/mm3      nRBC 0.0 /100 WBC     Comprehensive Metabolic Panel [[id number]]  (Abnormal) Collected: 05/22/25 1642    Specimen: Blood Updated: 05/22/25 1753     Glucose 98 mg/dL      BUN 15 mg/dL      Creatinine 1.10 mg/dL      Sodium 138 mmol/L      Potassium 4.1 mmol/L      Chloride 99 mmol/L      CO2 21.8 mmol/L      Calcium 10.0 mg/dL      Total Protein 7.5 g/dL      Albumin 4.8 g/dL      ALT (SGPT) 21 U/L      AST (SGOT) 26 U/L      Alkaline Phosphatase 51 U/L      Total Bilirubin 1.9 mg/dL      Globulin 2.7 gm/dL      A/G Ratio 1.8 g/dL      BUN/Creatinine Ratio 13.6     Anion Gap 17.2 mmol/L      eGFR 85.4 mL/min/1.73     Narrative:      GFR Categories in Chronic Kidney Disease (CKD)              GFR Category          GFR (mL/min/1.73)    Interpretation  G1                    90 or greater        Normal or high (1)  G2                    60-89                Mild decrease (1)  G3a                   45-59                Mild to moderate decrease  G3b                   30-44                Moderate to severe decrease  G4                    15-29                Severe decrease  G5                    14 or less           Kidney failure    (1)In the absence of evidence of kidney disease, neither GFR category G1 or G2 fulfill the criteria for CKD.    eGFR  calculation 2021 CKD-EPI creatinine equation, which does not include race as a factor    High Sensitivity Troponin T [[id number]]  (Normal) Collected: 05/22/25 1642    Specimen: Blood Updated: 05/22/25 1753     HS Troponin T <6 ng/L     Narrative:      High Sensitive Troponin T Reference Range:  <14.0 ng/L- Negative Female for AMI  <22.0 ng/L- Negative Male for AMI  >=14 - Abnormal Female indicating possible myocardial injury.  >=22 - Abnormal Male indicating possible myocardial injury.   Clinicians would have to utilize clinical acumen, EKG, Troponin, and serial changes to determine if it is an Acute Myocardial Infarction or myocardial injury due to an underlying chronic condition.         Lipase [[id number]]  (Abnormal) Collected: 05/22/25 1642    Specimen: Blood Updated: 05/22/25 1714     Lipase 62 U/L     Extra Tubes [[id number]] Collected: 05/22/25 1642    Specimen: Blood Updated: 05/22/25 1700    Narrative:      The following orders were created for panel order Extra Tubes.  Procedure                               Abnormality         Status                     ---------                               -----------         ------                     Lavender Top[[id number]]                                     Final result               Gold Top - SST[[id number]]                                   Final result               Light Blue Top[[id number]]                                   Final result                 Please view results for these tests on the individual orders.    Lavender Top [[id number]] Collected: 05/22/25 1642    Specimen: Blood Updated: 05/22/25 1700     Extra Tube hold for add-on     Comment: Auto resulted       Gold Top - SST [[id number]] Collected: 05/22/25 1642    Specimen: Blood Updated: 05/22/25 1700     Extra Tube Hold for add-ons.     Comment: Auto resulted.       Light Blue Top [[id number]] Collected: 05/22/25 1642    Specimen: Blood Updated: 05/22/25 1700     Extra Tube Hold for add-ons.      Comment: Auto resulted                I reviewed the patient's new clinical results.    Assessment & Plan      Pulmonary embolism    History of DVT (deep vein thrombosis)    Erosive esophagitis    KEVON on CPAP    Elevated lipase      PE   -echo    -duplex BLE   -Lovenox   -Heme/oc consult    Erosive esophagitis   -continue home Carafate and Voquezna-  recently started by GI    Elevated Lipase   -recheck in am along with amylase      CODE STATUS:  Code status (Patient has no pulse and is not breathing):  CPR (Attempt to Resuscitate)  Medical Interventions (Patient has pulse or is breathing):  Full Support  Level of Support Discussed with:  Patient    Admission Status:  I believe this patient meets inpatient status    Expected length of stay:  2 midnights or greater    I discussed the patient's findings and my recommendations with patient.     Gillian Hughes, SHAUNA  05/23/25  03:37 EDT        Electronically signed by Tessie Grewal MD at 05/26/25 0842          Emergency Department Notes        Julianna Nair RN at 05/22/25 1642          Patient evaluated by provider and will return to the waiting room with Intravenous line in place. Patient has been instructed not to inject anything into the IV, or leave premesis with IV in place. Patient pending further evaluation, treatment, testing / monitoring.     Electronically signed by Julianna Nair RN at 05/22/25 1642       Ambrocio De Oliveira MD at 05/22/25 1640          Subjective     Provider in Triage Note  Due to significant overcrowding in the emergency department patient was initially seen and evaluated in triage.  Provider in triage recommended patient placement in the treatment area to initiate therapy and movement to an ER bed as soon as possible.    Patient presents with complaints of worsening right flank pain.  It started this morning after he ate.  He was seen at [facility] ED at the onset and had labs, CT and US performed.  He was discharged home but his pain became  worse with evening.  Right flank and right abdomen.  Some nausea no vomiting.  He took some laxatives and had a bowel movement but reports no relief.        History of Present Illness  Provider in triage note reviewed    History of present illness a 44-year-old male complains of severe pain in the right upper abdomen right chest area sharp in nature nonradiating hurts when he takes deep breath.  He states he has nausea with it but it gets worse whenever he eats or drinks.  He states he breaks out in a sweat.  No significant shortness of breath.  He denies any precordial chest pain neck arm jaw pain he has had a little bit of lightheadedness but no syncope.  No headache vision change speech difficulty leg pain or swelling no recent long car ride plane ride immobilization foreign travels antibiotic use or hospitalization.  He went to the freestanding ER this morning he had some CTs and ultrasounds and labs and urine was unremarkable and he was discharged home he states the pain became intensely worse especially after he ate.      Review of Systems   Constitutional:  Negative for chills and fever.   Respiratory:  Negative for cough, chest tightness and shortness of breath.    Cardiovascular:  Positive for chest pain. Negative for palpitations and leg swelling.   Gastrointestinal:  Positive for abdominal pain and nausea. Negative for blood in stool and vomiting.   Genitourinary:  Negative for difficulty urinating, dysuria and hematuria.   Musculoskeletal:  Negative for back pain and neck pain.   Skin:  Negative for rash.   Neurological:  Negative for dizziness and light-headedness.       Past Medical History:   Diagnosis Date    Anxiety     GERD (gastroesophageal reflux disease)        Allergies   Allergen Reactions    Prednisone GI Bleeding     PO prednisone causes GI bleeding per patient       Past Surgical History:   Procedure Laterality Date    ENDOSCOPY N/A 8/12/2024    Procedure: ESOPHAGOGASTRODUODENOSCOPY;   Surgeon: Oseas Pfeiffer MD;  Location: [facility] ENDOSCOPY;  Service: Gastroenterology;  Laterality: N/A;  POST-EROSIVE ESOPHAGITIS       No family history on file.    Social History     Socioeconomic History    Marital status:    Tobacco Use    Smoking status: Former     Types: Cigars    Smokeless tobacco: Former     Types: Chew   Vaping Use    Vaping status: Never Used   Substance and Sexual Activity    Alcohol use: Not Currently    Drug use: Not Currently     Types: Marijuana     Comment: occ    Sexual activity: Defer     Prior to Admission medications    Medication Sig Start Date End Date Taking? Authorizing Provider   ondansetron ODT (ZOFRAN-ODT) 4 MG disintegrating tablet Place 1 tablet on the tongue Every 8 (Eight) Hours As Needed for Nausea or Vomiting. 5/18/25  Yes Hu Lam PA-C   sucralfate (CARAFATE) 1 g tablet Take 1 tablet by mouth 4 (Four) Times a Day. 5/6/25  Yes Maury Esparza MD   Vonoprazan Fumarate (Voquezna) 20 MG tablet Take 1 tablet by mouth Daily.   Yes Maury Esparza MD   pantoprazole (PROTONIX) 40 MG EC tablet Take 1 tablet by mouth 2 (Two) Times a Day.  5/22/25  ProviderMaury MD          Objective   Physical Exam  Constitutional is a 45-year-old awake alert nontoxic.  Triage vital signs reviewed.  Extraocular muscles are intact pupils equal round reactive sclera clear neck supple no adenopathy no JV no bruits back no cervical lumbar spine there is no CVA tenderness heart is regular without murmur rub lungs are clear no retraction no use of accessories abdomen soft mild right upper quadrant tenderness no rebound no guarding good bowel sounds no peritoneal findings or pulsatile masses extremities pulses equal in all extremities no edema no cords no Homans' sign no evidence of DVT skin warm dry without rash neurologic awake alert and follows commands motor strength normal without focal weakness.  Procedures          ED Course      Results for  orders placed or performed during the hospital encounter of 05/22/25   Lipase    Collection Time: 05/22/25  4:42 PM    Specimen: Blood   Result Value Ref Range    Lipase 62 (H) 13 - 60 U/L   Comprehensive Metabolic Panel    Collection Time: 05/22/25  4:42 PM    Specimen: Blood   Result Value Ref Range    Glucose 98 65 - 99 mg/dL    BUN 15 6 - 20 mg/dL    Creatinine 1.10 0.76 - 1.27 mg/dL    Sodium 138 136 - 145 mmol/L    Potassium 4.1 3.5 - 5.2 mmol/L    Chloride 99 98 - 107 mmol/L    CO2 21.8 (L) 22.0 - 29.0 mmol/L    Calcium 10.0 8.6 - 10.5 mg/dL    Total Protein 7.5 6.0 - 8.5 g/dL    Albumin 4.8 3.5 - 5.2 g/dL    ALT (SGPT) 21 1 - 41 U/L    AST (SGOT) 26 1 - 40 U/L    Alkaline Phosphatase 51 39 - 117 U/L    Total Bilirubin 1.9 (H) 0.0 - 1.2 mg/dL    Globulin 2.7 gm/dL    A/G Ratio 1.8 g/dL    BUN/Creatinine Ratio 13.6 7.0 - 25.0    Anion Gap 17.2 (H) 5.0 - 15.0 mmol/L    eGFR 85.4 >60.0 mL/min/1.73   High Sensitivity Troponin T    Collection Time: 05/22/25  4:42 PM    Specimen: Blood   Result Value Ref Range    HS Troponin T <6 <22 ng/L   Lavender Top    Collection Time: 05/22/25  4:42 PM   Result Value Ref Range    Extra Tube hold for add-on    Gold Top - SST    Collection Time: 05/22/25  4:42 PM   Result Value Ref Range    Extra Tube Hold for add-ons.    Light Blue Top    Collection Time: 05/22/25  4:42 PM   Result Value Ref Range    Extra Tube Hold for add-ons.    ECG 12 Lead Chest Pain    Collection Time: 05/22/25  6:14 PM   Result Value Ref Range    QT Interval 395 ms    QTC Interval 438 ms     CT Angiogram Chest Pulmonary Embolism  Result Date: 5/22/2025  Impression: Acute pulmonary emboli in the right lower lobe segmental and subsegmental pulmonary arteries. No CT evidence of right heart strain. Critical Findings were verbally communicated with Dr. Jacobo Vigil MD on 5/22/2025 at 6:08 p.m. Electronically Signed: Fredi Vigil MD  5/22/2025 6:06 PM EDT  Workstation ID: [id number]    XR Ribs  Right With PA Chest  Result Date: 5/22/2025  Impression: No displaced right rib fracture. No acute chest finding. Electronically Signed: Anne Marie Schwartz MD  5/22/2025 4:51 PM EDT  Workstation ID: [id number]    US Abdomen Limited  Result Date: 5/22/2025  Impression: Normal right upper quadrant ultrasound. Electronically Signed: Bull Dickerson MD  5/22/2025 9:18 AM EDT  Workstation ID: [id number]    CT Abdomen Pelvis Without Contrast  Result Date: 5/22/2025  No acute process demonstrated. No urolithiasis or obstructive uropathy Electronically Signed: Valdez Dowling  5/22/2025 7:56 AM EDT  Workstation ID: [id number]    Medications   sodium chloride 0.9 % flush 10 mL (has no administration in time range)   enoxaparin sodium (LOVENOX) syringe 105 mg (has no administration in time range)   HYDROmorphone (DILAUDID) injection 0.5 mg (0.5 mg Intravenous Given 5/22/25 1806)   ondansetron (ZOFRAN) injection 4 mg (4 mg Intravenous Given 5/22/25 1805)   iopamidol (ISOVUE-370) 76 % injection 100 mL (100 mL Intravenous Given 5/22/25 1754)                                         EKG my interpretation normal sinus rhythm rate of 74 left atrial enlargement left anterior fascicular block no acute ischemic changes no change from 5/17/2025 abnormal EKG              Medical Decision Making  Medical decision making.  Patient IV established.  Labs obtained by independent review lipase normal comprehensive metabolic profile unremarkable liver lipase troponin was less than 6.  EKG my interpretation normal sinus rhythm rate of 74 left atrial large left anterior fascicular block no acute ischemic change no change from 5/17/2025 abnormal EKG.  Patient given IV and a monitor was placed showed a sinus rhythm on my independent review.  He was given Dilaudid 0.5 mg IV and Zofran 4 mg IV.  His records reviewed today from the freestanding he had a CT of his abdomen pelvis without contrast and ultrasound of his abdomen gallbladder all of which were  unremarkable.  Patient underwent a CT scan of the chest PE protocol my independent review there is a PE in the right lung.  I do not see evidence of right heart strain do not see pneumonia pneumothorax or other acute findings radiology acute pulmonary emboli in the right lower lobe segmental and subsegmental pulmonary arteries.  No evidence of right heart strain.  Patient was made aware of the findings.  On repeat exam he is feeling better.  I do not see any evidence that suggest acute failure pneumonia pneumothorax acute ST elevation myocardial infarction aneurysm AAA acute cholecystitis appendicitis or sepsis based on my history and physical and clinical findings.  Patient was started on Lovenox 1 mg/kg subcutaneously I talked to [place] we discussed the case the patient be admitted for further care stable otherwise unremarkable ER course.    Problems Addressed:  Other acute pulmonary embolism without acute cor pulmonale: complicated acute illness or injury    Amount and/or Complexity of Data Reviewed  Labs: ordered. Decision-making details documented in ED Course.  Radiology: ordered and independent interpretation performed. Decision-making details documented in ED Course.  ECG/medicine tests: ordered and independent interpretation performed. Decision-making details documented in ED Course.    Risk  Parenteral controlled substances.  Decision regarding hospitalization.        Final diagnoses:   Other acute pulmonary embolism without acute cor pulmonale       ED Disposition  ED Disposition       ED Disposition   Decision to Admit    Condition   --    Comment   Level of Care: Telemetry [5]   Admitting Physician: CARLOZ FERNÁNDEZ [[id number]]   Attending Physician: CARLOZ FERNÁNDEZ [[id number]]                 No follow-up provider specified.       Medication List      No changes were made to your prescriptions during this visit.            Ambrocio De Oliveira MD  05/22/25 7064      Electronically signed by Ambrocio De Oliveira MD at  25 2241       Operative/Procedure Notes (last 7 days)  Notes from 25 1010 through 25 1010   No notes of this type exist for this encounter.          Physician Progress Notes (last 7 days)        Ciro Murdock MD at 25 1131                Hematology/Oncology Inpatient Progress Note    PATIENT NAME: Jass Odell  : 1980  MRN: [id number]    CHIEF COMPLAINT: Chest pain.     HISTORY OF PRESENT ILLNESS:      2025: I was asked to see Mr. Odell who came in complaining of pain in the right lower chest. CT of the abdomen pelvis revealed no abnormalities. Subsequent to this he had an angiogram of the chest and it reported acute pulmonary emboli in the region of the right lower lobe segmental and subsegmental pulmonary arteries. There was no evidence of right heart strain. He also had a duplex scan of both lower extremities that revealed no evidence of deep vein thrombosis. He was started on anticoagulation. He gave a history of a deep vein thrombus that was diagnosed in . He woke up 1 morning with pain in the left lower extremity. In the next 1 or 2 days there was progressive edema and he sought attention from an urgent care facility where he was treated with antibiotics with the suspicion of cellulitis. He sought attention from his primary care physician who did not obtained a Doppler. This confirmed a thrombus involving the entirety of the saphenous vein extending to the saphenofemoral junction and into the proximal femoral vein. He was placed on apixaban and took the medication for several months. He could not be any more specific and I did not find any additional information on the chart. At the time he underwent extensive testing for an inherited predisposition to thrombosis, including protein C and protein S activity as well as factor V Leiden and prothrombin gene mutation. All were negative. He also was tested for lupus anticoagulant, beta-2 glycoprotein antibodies and  cardiolipin antibodies which were as well negative. Approximately 72 hours before his symptoms started this last time he was admitted to the hospital with upper gastrointestinal symptoms. He stayed in the hospital for approximately 48 hours. On exam he is a well-built man who does not seem in distress. He is conversant and oriented. There is no jaundice. The lungs are diminished bilaterally and the heart regular. The abdomen is protuberant and soft. The liver and spleen are nonenlarged. There is no edema of the lower extremities. It is very possible that the recent admission to the hospital is the provocation for the present pulmonary embolism. However, it is a recurring process that started at a young age which, despite the negative results on the above testing confirms that Mr. Odell has an increased propensity to thrombosis. On this basis I believe that he should be treated with lifelong anticoagulation and apixaban, if it is a medication that his insurance covers, would be appropriate. I do not believe that much more testing is necessary with the exception of repeating lupus anticoagulant, anticardiolipin antibodies and antibeta-2 lipoprotein antibody. This because, if positive, it would be necessary to treat him with warfarin. Discussed at great length with him and with his spouse and answered questions.     Subjective   5/26/2025: Feeling progressively better. No chest pain. His constipation was resolved.     ROS:  Review of Systems   Constitutional:  Negative for activity change, appetite change, chills, diaphoresis, fatigue, fever and unexpected weight change.   HENT:  Negative for congestion, dental problem, drooling, ear discharge, ear pain, facial swelling, hearing loss, mouth sores, nosebleeds, postnasal drip, rhinorrhea, sinus pressure, sinus pain, sneezing, sore throat, tinnitus, trouble swallowing and voice change.    Eyes:  Negative for photophobia, pain, discharge, redness, itching and visual  disturbance.   Respiratory:  Negative for apnea, cough, choking, chest tightness, shortness of breath, wheezing and stridor.    Cardiovascular:  Negative for chest pain, palpitations and leg swelling.   Gastrointestinal:  Negative for abdominal distention, abdominal pain, anal bleeding, blood in stool, constipation, diarrhea, nausea, rectal pain and vomiting.   Endocrine: Negative for cold intolerance, heat intolerance, polydipsia and polyuria.   Genitourinary:  Negative for decreased urine volume, difficulty urinating, dysuria, flank pain, frequency, genital sores, hematuria and urgency.   Musculoskeletal:  Negative for arthralgias, back pain, gait problem, joint swelling, myalgias, neck pain and neck stiffness.   Skin:  Negative for color change, pallor and rash.   Neurological:  Negative for dizziness, tremors, seizures, syncope, facial asymmetry, speech difficulty, weakness, light-headedness, numbness and headaches.   Hematological:  Negative for adenopathy. Does not bruise/bleed easily.   Psychiatric/Behavioral:  Negative for agitation, behavioral problems, confusion, decreased concentration, hallucinations, self-injury, sleep disturbance and suicidal ideas. The patient is not nervous/anxious.         MEDICATIONS:    Scheduled Meds:  enoxaparin sodium, 1 mg/kg, Subcutaneous, Q12H  sodium chloride, 10 mL, Intravenous, Q12H  [Held by provider] sucralfate, 1 g, Oral, 4x Daily  Vonoprazan Fumarate, 20 mg, Oral, Daily       Continuous Infusions:  Pharmacy to Dose enoxaparin (LOVENOX),        PRN Meds:    acetaminophen    senna-docusate sodium **AND** polyethylene glycol **AND** bisacodyl **AND** bisacodyl    HYDROmorphone    nitroglycerin    ondansetron ODT **OR** ondansetron    oxyCODONE    Pharmacy to Dose enoxaparin (LOVENOX)    [COMPLETED] Insert Peripheral IV **AND** sodium chloride    sodium chloride    sodium chloride     ALLERGIES:    Allergies   Allergen Reactions    Prednisone GI Bleeding     PO prednisone  "causes GI bleeding per patient       Objective    VITALS:   /65 (BP Location: Left arm, Patient Position: Lying)   Pulse 67   Temp 97.9 °F (36.6 °C) (Oral)   Resp 25   Ht 177.8 cm (70\")   Wt 100 kg (220 lb 10.9 oz)   SpO2 97%   BMI 31.66 kg/m²     PHYSICAL EXAM: (performed by MD)  Physical Exam  Constitutional:       General: He is not in acute distress.     Appearance: He is ill-appearing. He is not toxic-appearing or diaphoretic.   HENT:      Head: Normocephalic and atraumatic.      Right Ear: External ear normal.      Left Ear: External ear normal.      Nose: Nose normal.      Mouth/Throat:      Mouth: Mucous membranes are moist.      Pharynx: Oropharynx is clear.   Eyes:      General: No scleral icterus.        Right eye: No discharge.         Left eye: No discharge.      Conjunctiva/sclera: Conjunctivae normal.      Pupils: Pupils are equal, round, and reactive to light.   Cardiovascular:      Rate and Rhythm: Normal rate and regular rhythm.      Pulses: Normal pulses.      Heart sounds: Normal heart sounds. No murmur heard.     No friction rub. No gallop.   Pulmonary:      Effort: No respiratory distress.      Breath sounds: No stridor. No wheezing, rhonchi or rales.   Chest:      Chest wall: No tenderness.   Abdominal:      General: Abdomen is flat. Bowel sounds are normal. There is no distension.      Palpations: Abdomen is soft. There is no mass.      Tenderness: There is no abdominal tenderness. There is no right CVA tenderness, left CVA tenderness, guarding or rebound.   Musculoskeletal:         General: No tenderness, deformity or signs of injury.      Cervical back: No rigidity.      Right lower leg: No edema.      Left lower leg: No edema.   Lymphadenopathy:      Cervical: No cervical adenopathy.   Skin:     General: Skin is warm and dry.      Coloration: Skin is not jaundiced or pale.      Findings: No bruising or rash.   Neurological:      General: No focal deficit present.      Mental " Status: He is alert and oriented to person, place, and time.      Cranial Nerves: No cranial nerve deficit.   Psychiatric:         Mood and Affect: Mood normal.         Behavior: Behavior normal.         Thought Content: Thought content normal.         Judgment: Judgment normal.     KATYA Murdock MD performed the physical exam on 5/26/2025 as documented above.       RECENT LABS:  Lab Results (last 24 hours)       Procedure Component Value Units Date/Time    Basic Metabolic Panel [[id number]]  (Abnormal) Collected: 05/26/25 0033    Specimen: Blood from Arm, Left Updated: 05/26/25 0104     Glucose 103 mg/dL      BUN 14 mg/dL      Creatinine 1.05 mg/dL      Sodium 140 mmol/L      Potassium 4.0 mmol/L      Chloride 103 mmol/L      CO2 26.9 mmol/L      Calcium 9.7 mg/dL      BUN/Creatinine Ratio 13.3     Anion Gap 10.1 mmol/L      eGFR 90.3 mL/min/1.73     Narrative:      GFR Categories in Chronic Kidney Disease (CKD)              GFR Category          GFR (mL/min/1.73)    Interpretation  G1                    90 or greater        Normal or high (1)  G2                    60-89                Mild decrease (1)  G3a                   45-59                Mild to moderate decrease  G3b                   30-44                Moderate to severe decrease  G4                    15-29                Severe decrease  G5                    14 or less           Kidney failure    (1)In the absence of evidence of kidney disease, neither GFR category G1 or G2 fulfill the criteria for CKD.    eGFR calculation 2021 CKD-EPI creatinine equation, which does not include race as a factor    CBC & Differential [[id number]]  (Abnormal) Collected: 05/26/25 0033    Specimen: Blood from Arm, Left Updated: 05/26/25 0042    Narrative:      The following orders were created for panel order CBC & Differential.  Procedure                               Abnormality         Status                     ---------                               -----------          ------                     CBC Auto Differential[[id number]]        Abnormal            Final result                 Please view results for these tests on the individual orders.    CBC Auto Differential [[id number]]  (Abnormal) Collected: 05/26/25 0033    Specimen: Blood from Arm, Left Updated: 05/26/25 0042     WBC 7.07 10*3/mm3      RBC 4.84 10*6/mm3      Hemoglobin 16.0 g/dL      Hematocrit 46.6 %      MCV 96.3 fL      MCH 33.1 pg      MCHC 34.3 g/dL      RDW 10.8 %      RDW-SD 38.9 fl      MPV 10.3 fL      Platelets 209 10*3/mm3      Neutrophil % 45.7 %      Lymphocyte % 41.4 %      Monocyte % 9.5 %      Eosinophil % 2.4 %      Basophil % 0.7 %      Immature Grans % 0.3 %      Neutrophils, Absolute 3.23 10*3/mm3      Lymphocytes, Absolute 2.93 10*3/mm3      Monocytes, Absolute 0.67 10*3/mm3      Eosinophils, Absolute 0.17 10*3/mm3      Basophils, Absolute 0.05 10*3/mm3      Immature Grans, Absolute 0.02 10*3/mm3      nRBC 0.0 /100 WBC           IMAGING REVIEWED:  No radiology results for the last day    Assessment & Plan   ASSESSMENT:  Pulmonary embolism: I have reviewed all the records. He was indeed seen by Dr. Madison in the past. Extensive investigations were reported negative. During this admission he was found to be negative for lupus anticoagulant or antiphospholipid antibodies. No intervention from me at this point. He can be discharged from my perspective.     PLAN:  As above.     Ciro Murdock MD on 5/26/2025 at 11:35 AM.               Electronically signed by Ciro Murdock MD at 05/26/25 4080       Renee Pena PA-C at 05/25/25 1155       Attestation signed by Dmitriy Cristobal MD at 05/25/25 1230      I have reviewed this documentation and agree.                           LOS: 1 day   Patient Care Team:  Luan Gomes MD as PCP - General (Family Medicine)    Subjective     Interval History: Small bowel movement overnight. Continues with abdominal pain, but less than  yesterday.  Urinating normally.    Patient Complaints: per above    History taken from: patient    Review of Systems   Constitutional:  Positive for activity change and fatigue.   HENT:  Negative for trouble swallowing.    Respiratory:  Negative for chest tightness, shortness of breath and wheezing.    Cardiovascular:  Negative for chest pain, palpitations and leg swelling.   Gastrointestinal:  Positive for abdominal pain and constipation. Negative for nausea and vomiting.   Endocrine: Negative for polyuria.   Genitourinary:  Negative for difficulty urinating.   Musculoskeletal:  Negative for gait problem.   Skin:  Negative for rash.   Neurological:  Negative for weakness.   Psychiatric/Behavioral:  Negative for confusion.            Objective     Vital Signs  Temp:  [97.4 °F (36.3 °C)-98.1 °F (36.7 °C)] 98.1 °F (36.7 °C)  Heart Rate:  [52-67] 66  Resp:  [13-26] 20  BP: (120-127)/(74-84) 120/77    Physical Exam:     General Appearance:    Alert, cooperative, in no acute distress,   Head:    Normocephalic, without obvious abnormality, atraumatic   Eyes:            Lids and lashes normal, conjunctivae and sclerae normal, no   icterus, no pallor, corneas clear   Ears:    Ears appear intact with no abnormalities noted   Throat:   No oral lesions, no thrush, oral mucosa moist   Neck:   No adenopathy, supple, trachea midline, no thyromegaly, no   carotid bruit, no JVD   Lungs:     Clear to auscultation,respirations regular, even and                unlabored    Heart:    Regular rhythm and normal rate, normal S1 and S2, no          murmur, no gallop, no rub, no click   Chest Wall:    No abnormalities observed   Abdomen:     Normal bowel sounds, no masses, no organomegaly, soft      Non-tender non-distended, no guarding,   Extremities:   Moves all extremities well, no edema, no cyanosis, no           Redness   Pulses:   Pulses palpable and equal bilaterally   Skin:   No bleeding, bruising or rash   Lymph nodes:   No palpable adenopathy             Results Review:    Lab Results (last 24 hours)       Procedure Component Value Units Date/Time    Basic Metabolic Panel [[id number]]  (Abnormal) Collected: 05/25/25 0457    Specimen: Blood from Arm, Left Updated: 05/25/25 0530     Glucose 100 mg/dL      BUN 11 mg/dL      Creatinine 1.06 mg/dL      Sodium 140 mmol/L      Potassium 4.3 mmol/L      Chloride 103 mmol/L      CO2 26.7 mmol/L      Calcium 9.8 mg/dL      BUN/Creatinine Ratio 10.4     Anion Gap 10.3 mmol/L      eGFR 89.3 mL/min/1.73     Narrative:      GFR Categories in Chronic Kidney Disease (CKD)              GFR Category          GFR (mL/min/1.73)    Interpretation  G1                    90 or greater        Normal or high (1)  G2                    60-89                Mild decrease (1)  G3a                   45-59                Mild to moderate decrease  G3b                   30-44                Moderate to severe decrease  G4                    15-29                Severe decrease  G5                    14 or less           Kidney failure    (1)In the absence of evidence of kidney disease, neither GFR category G1 or G2 fulfill the criteria for CKD.    eGFR calculation 2021 CKD-EPI creatinine equation, which does not include race as a factor    CBC & Differential [[id number]]  (Abnormal) Collected: 05/25/25 0457    Specimen: Blood from Arm, Left Updated: 05/25/25 0504    Narrative:      The following orders were created for panel order CBC & Differential.  Procedure                               Abnormality         Status                     ---------                               -----------         ------                     CBC Auto Differential[[id number]]        Abnormal            Final result                 Please view results for these tests on the individual orders.    CBC Auto Differential [[id number]]  (Abnormal) Collected: 05/25/25 0457    Specimen: Blood from Arm, Left Updated: 05/25/25 0504     WBC 6.77 10*3/mm3      RBC 5.01  10*6/mm3      Hemoglobin 16.8 g/dL      Hematocrit 48.7 %      MCV 97.2 fL      MCH 33.5 pg      MCHC 34.5 g/dL      RDW 11.0 %      RDW-SD 39.6 fl      MPV 10.1 fL      Platelets 213 10*3/mm3      Neutrophil % 54.4 %      Lymphocyte % 33.4 %      Monocyte % 9.6 %      Eosinophil % 1.9 %      Basophil % 0.6 %      Immature Grans % 0.1 %      Neutrophils, Absolute 3.68 10*3/mm3      Lymphocytes, Absolute 2.26 10*3/mm3      Monocytes, Absolute 0.65 10*3/mm3      Eosinophils, Absolute 0.13 10*3/mm3      Basophils, Absolute 0.04 10*3/mm3      Immature Grans, Absolute 0.01 10*3/mm3      nRBC 0.0 /100 WBC     Lupus Anticoagulant [[id number]] Collected: 05/23/25 0928    Specimen: Blood from Arm, Right Updated: 05/24/25 1909     Dilute Prothrombin Time(dPT) 34.2 sec      dPT Confirm Ratio 0.96 Ratio      Thrombin Time 20.2 sec      PTT Lupus Anticoagulant 33.5 sec      Dilute Viper Venom Time 37.2 sec      Lupus Anticoagulant Reflex Comment:     Comment: No lupus anticoagulant was detected.       Narrative:      Performed at:  [address] [place] [address]  [id number]  : Alyssa Rodriguez MD, Phone:  [phone]    Cardiolipin Antibody [[id number]] Collected: 05/23/25 0928    Specimen: Blood from Arm, Right Updated: 05/24/25 1611     Anticardiolipin IgG <9 GPL U/mL      Comment:                           Negative:              <15                            Indeterminate:     15 - 20                            Low-Med Positive: >20 - 80                            High Positive:         >80        Anticardiolipin IgM <9 MPL U/mL      Comment:                           Negative:              <13                            Indeterminate:     13 - 20                            Low-Med Positive: >20 - 80                            High Positive:         >80        Anticardiolipin IgA <9 APL U/mL      Comment:                           Negative:              <12                             Indeterminate:     12 - 20                            Low-Med Positive: >20 - 80                            High Positive:         >80       Narrative:      Performed at:  [address] [facility]  [id number]  : Serjio Vidal PhD, Phone:  [phone]             Imaging Results (Last 24 Hours)       Procedure Component Value Units Date/Time    XR Abdomen KUB [[id number]] Collected: 05/24/25 1135     Updated: 05/24/25 1155    Narrative:      XR ABDOMEN KUB    Date of Exam: 5/24/2025 11:27 AM EDT    Indication: Constipation, abdominal pain    Comparison: CT abdomen and pelvis dated 5/22/2025    Findings:  The lung bases are clear. There is no organomegaly. There is a nonspecific nonobstructive small bowel gas pattern. There is mild gaseous distention of the colon. There is a mild colonic stool burden. No abnormal renal calcifications. There are   degenerative changes of the thoracolumbar spine.      Impression:      Impression:  1.Nonspecific nonobstructive small bowel gas pattern. Mild gaseous distention of the colon.  2.Mild colonic stool burden.        Electronically Signed: Gregg Shultz MD    5/24/2025 11:53 AM EDT    Workstation ID: [id number]                 I reviewed the patient's new clinical results.    Medication Review:   Scheduled Meds:enoxaparin sodium, 1 mg/kg, Subcutaneous, Q12H  sodium chloride, 10 mL, Intravenous, Q12H  [Held by provider] sucralfate, 1 g, Oral, 4x Daily  Vonoprazan Fumarate, 20 mg, Oral, Daily      Continuous Infusions:Pharmacy to Dose enoxaparin (LOVENOX),       PRN Meds:.  acetaminophen    senna-docusate sodium **AND** polyethylene glycol **AND** bisacodyl **AND** bisacodyl    HYDROmorphone    nitroglycerin    ondansetron ODT **OR** ondansetron    oxyCODONE    Pharmacy to Dose enoxaparin (LOVENOX)    [COMPLETED] Insert Peripheral IV **AND** sodium chloride    sodium chloride    sodium chloride     Assessment & Plan       Pulmonary embolism     History of DVT (deep vein thrombosis)    Erosive esophagitis    KEVON on CPAP    Elevated lipase      -transition to Eliquis at discharge, continue Lovenox for acute PE  -denies any further chest pain  -bowel regiment for constipation. He has not had a bowel movement in 3-4 days. Abdominal pain this morning more consistent with constipation. KUB showed gas distension, no obstruction, mild colonic stool burden. Discussed with patient to decrease frequency of pain medication. Had three small bowel movements last night. Not as in much paint today as yesterday, but still uncomfortable. Will attempt another bowel regimen. Patient is hesitant to leave with the pain he is experiencing. Will defer imaging at this time. He has had multiple CT scans since admission. Physical exam is benign, vitals are stable, he is resting comfortably in the room. He believes the sucralfate is contributing and is asking to hold this for a few days.  -Voquezna for erosive esophagitis    CODE Status:    Code status (Patient has no pulse and is not breathing):  CPR (Attempt to Resuscitate)  Medical Interventions (Patient has pulse or is breathing):  Full support  Level of support discussed with:  Patient    Admission status:  I believe this patient meets inpatient status  Expected length of stay:  2 midnights or greater  I discussed the patient's findings and my recommendations with the patient.    Plan for disposition:Home    Renee Pena PA-C  05/25/25  11:55 EDT          Electronically signed by Dmitriy Cristobal MD at 05/25/25 1230       Renee Pena PA-C at 05/24/25 1552       Attestation signed by Dmitriy Cristobal MD at 05/25/25 1230      I have reviewed this documentation and agree.                           LOS: 1 day   Patient Care Team:  Luan Gomes MD as PCP - General (Family Medicine)    Subjective     Interval History: Stable overnight    Patient Complaints: Less chest pain. Woke this morning with pain in LLQ and LUQ of  abdomen with passing of gas. No bowel movement in 3-4 days.    History taken from: patient    Review of Systems   Constitutional:  Positive for activity change. Negative for fatigue.   HENT:  Negative for trouble swallowing.    Eyes:  Positive for visual disturbance.   Respiratory:  Negative for cough, shortness of breath and wheezing.    Cardiovascular:  Negative for chest pain, palpitations and leg swelling.   Gastrointestinal:  Positive for abdominal pain and constipation. Negative for nausea and vomiting.   Endocrine: Negative for polyuria.   Genitourinary:  Negative for difficulty urinating.   Musculoskeletal:  Negative for gait problem.   Skin:  Negative for rash.   Neurological:  Negative for weakness and headaches.   Psychiatric/Behavioral:  Negative for confusion.            Objective     Vital Signs  Temp:  [97.4 °F (36.3 °C)-99.2 °F (37.3 °C)] 99.2 °F (37.3 °C)  Heart Rate:  [47-79] 65  Resp:  [13-21] 17  BP: (110-127)/(69-82) 121/82    Physical Exam:     General Appearance:    Alert, cooperative, in no acute distress,   Head:    Normocephalic, without obvious abnormality, atraumatic   Eyes:            Lids and lashes normal, conjunctivae and sclerae normal, no   icterus, no pallor, corneas clear, PERRLA   Ears:    Ears appear intact with no abnormalities noted   Throat:   No oral lesions, no thrush, oral mucosa moist   Neck:   No adenopathy, supple, trachea midline, no thyromegaly, no   carotid bruit, no JVD   Lungs:     Clear to auscultation,respirations regular, even and                unlabored    Heart:    Regular rhythm and normal rate, normal S1 and S2, no          murmur, no gallop, no rub, no click   Chest Wall:    No abnormalities observed   Abdomen:     Mild tenderness to LLQ and LUQ. Normal bowel sounds, no masses, no organomegaly, soft   Non-distended, no guarding,   Extremities:   Moves all extremities well, no edema, no cyanosis, no Redness   Pulses:   Pulses palpable and equal bilaterally    Skin:   No bleeding, bruising or rash   Lymph nodes:   No palpable adenopathy            Results Review:    Lab Results (last 24 hours)       Procedure Component Value Units Date/Time    Basic Metabolic Panel [[id number]]  (Abnormal) Collected: 05/24/25 0405    Specimen: Blood from Arm, Right Updated: 05/24/25 0447     Glucose 102 mg/dL      BUN 13 mg/dL      Creatinine 1.03 mg/dL      Sodium 137 mmol/L      Potassium 4.2 mmol/L      Chloride 102 mmol/L      CO2 26.9 mmol/L      Calcium 9.8 mg/dL      BUN/Creatinine Ratio 12.6     Anion Gap 8.1 mmol/L      eGFR 92.4 mL/min/1.73     Narrative:      GFR Categories in Chronic Kidney Disease (CKD)              GFR Category          GFR (mL/min/1.73)    Interpretation  G1                    90 or greater        Normal or high (1)  G2                    60-89                Mild decrease (1)  G3a                   45-59                Mild to moderate decrease  G3b                   30-44                Moderate to severe decrease  G4                    15-29                Severe decrease  G5                    14 or less           Kidney failure    (1)In the absence of evidence of kidney disease, neither GFR category G1 or G2 fulfill the criteria for CKD.    eGFR calculation 2021 CKD-EPI creatinine equation, which does not include race as a factor    CBC & Differential [[id number]]  (Abnormal) Collected: 05/24/25 0405    Specimen: Blood from Arm, Right Updated: 05/24/25 0425    Narrative:      The following orders were created for panel order CBC & Differential.  Procedure                               Abnormality         Status                     ---------                               -----------         ------                     CBC Auto Differential[[id number]]        Abnormal            Final result                 Please view results for these tests on the individual orders.    CBC Auto Differential [[id number]]  (Abnormal) Collected: 05/24/25 0405    Specimen:  Blood from Arm, Right Updated: 05/24/25 0425     WBC 6.83 10*3/mm3      RBC 4.83 10*6/mm3      Hemoglobin 15.8 g/dL      Hematocrit 46.7 %      MCV 96.7 fL      MCH 32.7 pg      MCHC 33.8 g/dL      RDW 11.0 %      RDW-SD 39.4 fl      MPV 10.2 fL      Platelets 199 10*3/mm3      Neutrophil % 58.5 %      Lymphocyte % 28.0 %      Monocyte % 10.0 %      Eosinophil % 2.6 %      Basophil % 0.6 %      Immature Grans % 0.3 %      Neutrophils, Absolute 4.00 10*3/mm3      Lymphocytes, Absolute 1.91 10*3/mm3      Monocytes, Absolute 0.68 10*3/mm3      Eosinophils, Absolute 0.18 10*3/mm3      Basophils, Absolute 0.04 10*3/mm3      Immature Grans, Absolute 0.02 10*3/mm3      nRBC 0.0 /100 WBC              Imaging Results (Last 24 Hours)       Procedure Component Value Units Date/Time    XR Abdomen KUB [[id number]] Collected: 05/24/25 1135     Updated: 05/24/25 1155    Narrative:      XR ABDOMEN KUB    Date of Exam: 5/24/2025 11:27 AM EDT    Indication: Constipation, abdominal pain    Comparison: CT abdomen and pelvis dated 5/22/2025    Findings:  The lung bases are clear. There is no organomegaly. There is a nonspecific nonobstructive small bowel gas pattern. There is mild gaseous distention of the colon. There is a mild colonic stool burden. No abnormal renal calcifications. There are   degenerative changes of the thoracolumbar spine.      Impression:      Impression:  1.Nonspecific nonobstructive small bowel gas pattern. Mild gaseous distention of the colon.  2.Mild colonic stool burden.        Electronically Signed: Gregg Shultz MD    5/24/2025 11:53 AM EDT    Workstation ID: [id number]                 I reviewed the patient's new clinical results.    Medication Review:   Scheduled Meds:enoxaparin sodium, 1 mg/kg, Subcutaneous, Q12H  sodium chloride, 10 mL, Intravenous, Q12H  sucralfate, 1 g, Oral, 4x Daily  Vonoprazan Fumarate, 20 mg, Oral, Daily      Continuous Infusions:Pharmacy to Dose enoxaparin (LOVENOX),       PRN  Meds:.  acetaminophen    senna-docusate sodium **AND** polyethylene glycol **AND** bisacodyl **AND** bisacodyl    HYDROmorphone    nitroglycerin    ondansetron ODT **OR** ondansetron    oxyCODONE    Pharmacy to Dose enoxaparin (LOVENOX)    [COMPLETED] Insert Peripheral IV **AND** sodium chloride    sodium chloride    sodium chloride     Assessment & Plan       Pulmonary embolism    History of DVT (deep vein thrombosis)    Erosive esophagitis    KEVON on CPAP    Elevated lipase      -transition to Eliquis at discharge, continue Lovenox for acute PE  -denies any further chest pain  -bowel regiment for constipation. He has not had a bowel movement in 3-4 days. Abdominal pain this morning more consistent with constipation. KUB showed gas distension, no obstruction, mild colonic stool burden. Discussed with patient to decrease frequency of pain medication.  -Sucralfate and Voquezna for erosive esophagitis      CODE Status:    Code status (Patient has no pulse and is not breathing):  CPR (Attempt to Resuscitate)  Medical Interventions (Patient has pulse or is breathing):  Full support  Level of support discussed with:  Patient    Admission status:  I believe this patient meets inpatient status  Expected length of stay:  2 midnights or greater  I discussed the patient's findings and my recommendations with the patient.    Plan for disposition:Home    Renee Pena PA-C  05/24/25  15:52 EDT          Electronically signed by Dmitriy Cristobal MD at 05/25/25 1230       Tessie Grewal MD at 05/23/25 1219               LOS: 1 day   Patient Care Team:  Luan Gomes MD as PCP - General (Family Medicine)    Subjective     Interval History: Stable overnight    Patient Complaints: Right-sided chest pain is persistent but less intense.  No shortness of breath.    History taken from: patient    Review of Systems   Constitutional:  Positive for activity change, appetite change and fatigue. Negative for chills.   HENT:  Negative for  congestion and facial swelling.    Eyes:  Negative for visual disturbance.   Respiratory:  Positive for shortness of breath. Negative for cough, wheezing and stridor.    Cardiovascular:  Positive for chest pain. Negative for palpitations and leg swelling.   Gastrointestinal:  Negative for abdominal pain, constipation, nausea and vomiting.   Genitourinary:  Positive for flank pain. Negative for dysuria.   Musculoskeletal:  Negative for arthralgias and back pain.   Skin:  Negative for rash and wound.   Neurological:  Negative for tremors, syncope, weakness, light-headedness and numbness.   Psychiatric/Behavioral:  Negative for confusion.            Objective     Vital Signs  Temp:  [97.8 °F (36.6 °C)-98.6 °F (37 °C)] 98.4 °F (36.9 °C)  Heart Rate:  [59-99] 59  Resp:  [11-22] 19  BP: (113-153)/(63-91) 118/70    Physical Exam:     General Appearance:    Alert, cooperative, in no acute distress,   Head:    Normocephalic, without obvious abnormality, atraumatic   Eyes:            Lids and lashes normal, conjunctivae and sclerae normal, no   icterus, no pallor, corneas clear, PERRLA   Ears:    Ears appear intact with no abnormalities noted   Throat:   No oral lesions, no thrush, oral mucosa moist   Neck:   No adenopathy, supple, trachea midline, no thyromegaly, no   carotid bruit, no JVD   Lungs:     Clear to auscultation,respirations regular, even and                  unlabored    Heart:    Regular rhythm and normal rate, normal S1 and S2, no            murmur, no gallop, no rub, no click   Chest Wall:    No abnormalities observed   Abdomen:     Normal bowel sounds, no masses, no organomegaly, soft        Non-tender non-distended, no guarding,   Extremities:   Moves all extremities well, no edema, no cyanosis, no             Redness   Pulses:   Pulses palpable and equal bilaterally   Skin:   No bleeding, bruising or rash   Lymph nodes:   No palpable adenopathy   Neurologic:   Cranial nerves 2 - 12 grossly intact,  sensation intact, DTR       present and equal bilaterally        Results Review:    Lab Results (last 24 hours)       Procedure Component Value Units Date/Time    Factor II, DNA Analysis [[id number]] Collected: 05/23/25 0928    Specimen: Blood from Arm, Right Updated: 05/23/25 0938    Factor 5 Leiden [[id number]] Collected: 05/23/25 0928    Specimen: Blood from Arm, Right Updated: 05/23/25 0938    Cardiolipin Antibody [[id number]] Collected: 05/23/25 0928    Specimen: Blood from Arm, Right Updated: 05/23/25 0938    Beta-2 Glycoprotein Antibodies [[id number]] Collected: 05/23/25 0928    Specimen: Blood from Arm, Right Updated: 05/23/25 0938    Lupus Anticoagulant [[id number]] Collected: 05/23/25 0928    Specimen: Blood from Arm, Right Updated: 05/23/25 0938    Basic Metabolic Panel [[id number]]  (Abnormal) Collected: 05/23/25 0423    Specimen: Blood from Arm, Right Updated: 05/23/25 0506     Glucose 113 mg/dL      BUN 15 mg/dL      Creatinine 1.05 mg/dL      Sodium 138 mmol/L      Potassium 4.1 mmol/L      Comment: Specimen hemolyzed.  Result may be falsely elevated.        Chloride 103 mmol/L      CO2 26.9 mmol/L      Calcium 9.5 mg/dL      BUN/Creatinine Ratio 14.3     Anion Gap 8.1 mmol/L      eGFR 90.3 mL/min/1.73     Narrative:      GFR Categories in Chronic Kidney Disease (CKD)              GFR Category          GFR (mL/min/1.73)    Interpretation  G1                    90 or greater        Normal or high (1)  G2                    60-89                Mild decrease (1)  G3a                   45-59                Mild to moderate decrease  G3b                   30-44                Moderate to severe decrease  G4                    15-29                Severe decrease  G5                    14 or less           Kidney failure    (1)In the absence of evidence of kidney disease, neither GFR category G1 or G2 fulfill the criteria for CKD.    eGFR calculation 2021 CKD-EPI creatinine equation, which does not include race  as a factor    Lipase [[id number]]  (Normal) Collected: 05/23/25 0423    Specimen: Blood from Arm, Right Updated: 05/23/25 0500     Lipase 33 U/L     Amylase [[id number]]  (Normal) Collected: 05/23/25 0423    Specimen: Blood from Arm, Right Updated: 05/23/25 0500     Amylase 73 U/L     CBC & Differential [[id number]]  (Abnormal) Collected: 05/23/25 0423    Specimen: Blood from Arm, Right Updated: 05/23/25 0434    Narrative:      The following orders were created for panel order CBC & Differential.  Procedure                               Abnormality         Status                     ---------                               -----------         ------                     CBC Auto Differential[[id number]]        Abnormal            Final result                 Please view results for these tests on the individual orders.    CBC Auto Differential [[id number]]  (Abnormal) Collected: 05/23/25 0423    Specimen: Blood from Arm, Right Updated: 05/23/25 0434     WBC 6.25 10*3/mm3      RBC 4.67 10*6/mm3      Hemoglobin 15.4 g/dL      Hematocrit 45.0 %      MCV 96.4 fL      MCH 33.0 pg      MCHC 34.2 g/dL      RDW 11.2 %      RDW-SD 39.8 fl      MPV 10.0 fL      Platelets 203 10*3/mm3      Neutrophil % 56.2 %      Lymphocyte % 28.6 %      Monocyte % 12.3 %      Eosinophil % 2.1 %      Basophil % 0.5 %      Immature Grans % 0.3 %      Neutrophils, Absolute 3.51 10*3/mm3      Lymphocytes, Absolute 1.79 10*3/mm3      Monocytes, Absolute 0.77 10*3/mm3      Eosinophils, Absolute 0.13 10*3/mm3      Basophils, Absolute 0.03 10*3/mm3      Immature Grans, Absolute 0.02 10*3/mm3      nRBC 0.0 /100 WBC     Sedimentation Rate [[id number]]  (Normal) Collected: 05/22/25 1642    Specimen: Blood Updated: 05/22/25 1835     Sed Rate 9 mm/hr     High Sensitivity Troponin T 1Hr [[id number]] Collected: 05/22/25 1805    Specimen: Blood from Arm, Right Updated: 05/22/25 1833     HS Troponin T <6 ng/L      Troponin T Numeric Delta --     Comment: Unable to  calculate.       Narrative:      High Sensitive Troponin T Reference Range:  <14.0 ng/L- Negative Female for AMI  <22.0 ng/L- Negative Male for AMI  >=14 - Abnormal Female indicating possible myocardial injury.  >=22 - Abnormal Male indicating possible myocardial injury.   Clinicians would have to utilize clinical acumen, EKG, Troponin, and serial changes to determine if it is an Acute Myocardial Infarction or myocardial injury due to an underlying chronic condition.         CBC & Differential [[id number]]  (Abnormal) Collected: 05/22/25 1642    Specimen: Blood Updated: 05/22/25 1828    Narrative:      The following orders were created for panel order CBC & Differential.  Procedure                               Abnormality         Status                     ---------                               -----------         ------                     CBC Auto Differential[[id number]]        Abnormal            Final result                 Please view results for these tests on the individual orders.    CBC Auto Differential [[id number]]  (Abnormal) Collected: 05/22/25 1642    Specimen: Blood Updated: 05/22/25 1828     WBC 12.31 10*3/mm3      RBC 5.05 10*6/mm3      Hemoglobin 16.6 g/dL      Hematocrit 48.7 %      MCV 96.4 fL      MCH 32.9 pg      MCHC 34.1 g/dL      RDW 10.8 %      RDW-SD 38.8 fl      MPV 10.5 fL      Platelets 223 10*3/mm3      Neutrophil % 68.6 %      Lymphocyte % 20.9 %      Monocyte % 9.4 %      Eosinophil % 0.6 %      Basophil % 0.2 %      Immature Grans % 0.3 %      Neutrophils, Absolute 8.44 10*3/mm3      Lymphocytes, Absolute 2.57 10*3/mm3      Monocytes, Absolute 1.16 10*3/mm3      Eosinophils, Absolute 0.07 10*3/mm3      Basophils, Absolute 0.03 10*3/mm3      Immature Grans, Absolute 0.04 10*3/mm3      nRBC 0.0 /100 WBC     Comprehensive Metabolic Panel [[id number]]  (Abnormal) Collected: 05/22/25 1642    Specimen: Blood Updated: 05/22/25 1753     Glucose 98 mg/dL      BUN 15 mg/dL      Creatinine 1.10  mg/dL      Sodium 138 mmol/L      Potassium 4.1 mmol/L      Chloride 99 mmol/L      CO2 21.8 mmol/L      Calcium 10.0 mg/dL      Total Protein 7.5 g/dL      Albumin 4.8 g/dL      ALT (SGPT) 21 U/L      AST (SGOT) 26 U/L      Alkaline Phosphatase 51 U/L      Total Bilirubin 1.9 mg/dL      Globulin 2.7 gm/dL      A/G Ratio 1.8 g/dL      BUN/Creatinine Ratio 13.6     Anion Gap 17.2 mmol/L      eGFR 85.4 mL/min/1.73     Narrative:      GFR Categories in Chronic Kidney Disease (CKD)              GFR Category          GFR (mL/min/1.73)    Interpretation  G1                    90 or greater        Normal or high (1)  G2                    60-89                Mild decrease (1)  G3a                   45-59                Mild to moderate decrease  G3b                   30-44                Moderate to severe decrease  G4                    15-29                Severe decrease  G5                    14 or less           Kidney failure    (1)In the absence of evidence of kidney disease, neither GFR category G1 or G2 fulfill the criteria for CKD.    eGFR calculation 2021 CKD-EPI creatinine equation, which does not include race as a factor    High Sensitivity Troponin T [[id number]]  (Normal) Collected: 05/22/25 1642    Specimen: Blood Updated: 05/22/25 1753     HS Troponin T <6 ng/L     Narrative:      High Sensitive Troponin T Reference Range:  <14.0 ng/L- Negative Female for AMI  <22.0 ng/L- Negative Male for AMI  >=14 - Abnormal Female indicating possible myocardial injury.  >=22 - Abnormal Male indicating possible myocardial injury.   Clinicians would have to utilize clinical acumen, EKG, Troponin, and serial changes to determine if it is an Acute Myocardial Infarction or myocardial injury due to an underlying chronic condition.         Lipase [[id number]]  (Abnormal) Collected: 05/22/25 1642    Specimen: Blood Updated: 05/22/25 1714     Lipase 62 U/L     Extra Tubes [[id number]] Collected: 05/22/25 1642    Specimen: Blood  Updated: 05/22/25 1700    Narrative:      The following orders were created for panel order Extra Tubes.  Procedure                               Abnormality         Status                     ---------                               -----------         ------                     Lavender Top[[id number]]                                     Final result               Gold Top - SST[[id number]]                                   Final result               Light Blue Top[[id number]]                                   Final result                 Please view results for these tests on the individual orders.    Lavender Top [[id number]] Collected: 05/22/25 1642    Specimen: Blood Updated: 05/22/25 1700     Extra Tube hold for add-on     Comment: Auto resulted       Gold Top - SST [[id number]] Collected: 05/22/25 1642    Specimen: Blood Updated: 05/22/25 1700     Extra Tube Hold for add-ons.     Comment: Auto resulted.       Light Blue Top [[id number]] Collected: 05/22/25 1642    Specimen: Blood Updated: 05/22/25 1700     Extra Tube Hold for add-ons.     Comment: Auto resulted                Imaging Results (Last 24 Hours)       Procedure Component Value Units Date/Time    CT Angiogram Chest Pulmonary Embolism [[id number]] Collected: 05/22/25 1800     Updated: 05/22/25 1809    Narrative:      CT ANGIOGRAM CHEST PULMONARY EMBOLISM    Date of Exam: 5/22/2025 5:47 PM EDT    Indication: Right pleuritic chest pain.    Comparison: Chest radiograph 5/22/2025. Chest CT 8/10/2024.    Technique: Axial CT images were obtained of the chest after the uneventful intravenous administration of iodinated contrast utilizing pulmonary embolism protocol.  In addition, a 3-D volume rendered image was created for interpretation.  Sagittal and   coronal reconstructions were performed.  Automated exposure control and iterative reconstruction methods were used.      Findings:    Thyroid and thoracic inlet: No significant abnormality.    Lymph nodes: No  pathologic appearing lymph nodes by imaging criteria.    Cardiovascular: Normal appearing heart size. No pericardial effusion. Aorta and main pulmonary artery diameters are within normal range.No coronary artery calcifications.. Acute pulmonary emboli in the segmental and segmental pulmonary arteries of the   right lower lobe. Suboptimal visualization of a few segmental and subsegmental pulmonary arteries secondary to motion artifacts. No CT evidence of right heart strain.    Esophagus: No significant abnormality.    Lung parenchyma: Scattered atelectasis. No suspicious appearing opacities.    Airways: Patent trachea and mainstem bronchi.    Pleura: No pleural effusion or pneumothorax.    Chest wall and osseous structures: Degenerative changes of the imaged spine. No acute osseous abnormality.    Included abdomen: No significant abnormality.      Impression:      Impression:  Acute pulmonary emboli in the right lower lobe segmental and subsegmental pulmonary arteries. No CT evidence of right heart strain.    Critical Findings were verbally communicated with Dr. Jacobo Vigil MD on 5/22/2025 at 6:08 p.m.        Electronically Signed: Fredi Vigil MD    5/22/2025 6:06 PM EDT    Workstation ID: [id number]    XR Ribs Right With PA Chest [[id number]] Collected: 05/22/25 1650     Updated: 05/22/25 1653    Narrative:      XR RIBS RIGHT W PA CHEST    Date of Exam: 5/22/2025 4:43 PM EDT    Indication: right side chest wall pain    Comparison: AP chest 5/17/2025    Findings:  No acute displaced right rib fracture. No pleural effusion or pneumothorax. Clear lungs. Normal heart size.      Impression:      Impression:  No displaced right rib fracture. No acute chest finding.      Electronically Signed: Anne Marie Schwartz MD    5/22/2025 4:51 PM EDT    Workstation ID: [id number]                 I reviewed the patient's new clinical results.    Medication Review:   Scheduled Meds:enoxaparin sodium, 1 mg/kg, Subcutaneous,  Q12H  sodium chloride, 10 mL, Intravenous, Q12H  sucralfate, 1 g, Oral, 4x Daily  Vonoprazan Fumarate, 20 mg, Oral, Daily      Continuous Infusions:Pharmacy to Dose enoxaparin (LOVENOX),       PRN Meds:.  acetaminophen    senna-docusate sodium **AND** polyethylene glycol **AND** bisacodyl **AND** bisacodyl    HYDROmorphone    nitroglycerin    ondansetron ODT **OR** ondansetron    oxyCODONE    Pharmacy to Dose enoxaparin (LOVENOX)    [COMPLETED] Insert Peripheral IV **AND** sodium chloride    sodium chloride    sodium chloride     Assessment & Plan       Pulmonary embolism    History of DVT (deep vein thrombosis)    Erosive esophagitis    KEVON on CPAP    Elevated lipase    - continue therapeutic dose Lovenox for acute PE; transition to oral anticoagulation at discharge.  -home meds for GERD/Esophagitis      CODE Status:    Code status (Patient has no pulse and is not breathing):  CPR (Attempt to Resuscitate)  Medical Interventions (Patient has pulse or is breathing):  Full support  Level of support discussed with:  Patient    Admission status:  I believe this patient meets inpatient status  Expected length of stay:  2 midnights or greater  I discussed the patient's findings and my recommendations with the patient.    Plan for disposition:Home at discharge    Tessie Grewal MD  25  12:19 EDT            Electronically signed by Tessie Grewal MD at 25 1223          Consult Notes (last 7 days)        Ciro Murdock MD at 25 1347        Consult Orders    1. Inpatient Hematology & Oncology Consult [[id number]] ordered by Gillian Hughes APRN at 25 5634                         Hematology/Oncology Inpatient Consultation    Patient name: Jass Odell  : 1980  MRN: [id number]  Referring Provider: Gillian VILLATORO  Reason for Consultation: Pulmonary embolism     Chief complaint: Pulmonary embolism     History of present illness:    Jass Odell is a 45 y.o. male with a past medical history of GERD,  left lower extremity DVT, obstructive sleep apnea on CPAP who presented to [facility] on 5/22/2025 with complaints of right-sided abdominal pain.  CT abdomen and pelvis per radiology showed no acute process.  Right upper quadrant ultrasound was also normal.  CT angiogram of the chest showed acute pulmonary emboli in the right lower lobe segmental and subsegmental pulmonary arteries no CT evidence of right heart strain.  Bilateral venous duplex Dopplers were negative.  Initially started on treatment Lovenox.  Review of records shows he was hospitalized 5/17/2025 to 5/18/2025 for epigastric pain and sinus bradycardia.  He was seen by GI who started him on Voquenza cardiology was consulted for sinus bradycardia and no further workup was advised at that time.  Records shows that in February 2017 he had a thrombus involving the entire great saphenous vein below.  Left thrombus was extending from the greater saphenous common femoral vein confluence with a small portion of thrombus within the common femoral vein without complete occlusion consistent with deep extension of thrombus.  He was started on Eliquis for 3 months minimum.  He saw Dr. Grubbs at [place] oncology hypercoagulability workup.  It did not appear that he followed up.  Further review of records shows that in August 2024 he had a CTA which was questionable for very small emboli within the subsegmental branches in the right lower lobe but possibly could be artifact.  Bilateral lower extremity venous Dopplers were negative at that time.  He reports his grandmother had blood clots.    05/23/25  Hematology/Oncology was consulted for recurrent pulmonary embolism unprovoked.  He was initially started on therapeutic dose of Lovenox.  And has been transitioned to oral anti-coagulation by primary team .  Hypercoagulability studies have been ordered.  Records shows he saw Dr. Grubbs oncology at [facility] in February 2017.  At that time, prothrombin gene  mutation was negative, cardiolipin IgM was 12.  IgG was 4, lupus anticoagulant was negative, beta-2 glycoprotein IgG AB was 0 beta-2 glycoprotein IgM AB was 3, factor V Leiden was negative    He/She  has a past medical history of Anxiety and GERD (gastroesophageal reflux disease).    PCP: Luan Gomes MD    History:  Past Medical History:   Diagnosis Date    Anxiety     GERD (gastroesophageal reflux disease)    ,   Past Surgical History:   Procedure Laterality Date    ENDOSCOPY N/A 8/12/2024    Procedure: ESOPHAGOGASTRODUODENOSCOPY;  Surgeon: Oseas Pfeiffer MD;  Location: [facility] ENDOSCOPY;  Service: Gastroenterology;  Laterality: N/A;  POST-EROSIVE ESOPHAGITIS   , History reviewed. No pertinent family history.,   Social History     Tobacco Use    Smoking status: Former     Types: Cigars    Smokeless tobacco: Former     Types: Chew   Vaping Use    Vaping status: Never Used   Substance Use Topics    Alcohol use: Not Currently    Drug use: Not Currently     Types: Marijuana     Comment: occ   ,   Medications Prior to Admission   Medication Sig Dispense Refill Last Dose/Taking    ondansetron ODT (ZOFRAN-ODT) 4 MG disintegrating tablet Place 1 tablet on the tongue Every 8 (Eight) Hours As Needed for Nausea or Vomiting. 15 tablet 0 5/21/2025    sucralfate (CARAFATE) 1 g tablet Take 1 tablet by mouth 4 (Four) Times a Day.   5/22/2025 at 12:00 PM    Vonoprazan Fumarate (Voquezna) 20 MG tablet Take 1 tablet by mouth Daily.   5/22/2025    apixaban (ELIQUIS) 5 MG tablet tablet Take 2 tablets by mouth 2 (Two) Times a Day for 7 days, THEN 1 tablet 2 (Two) Times a Day for 21 days. 70 tablet 0    , Scheduled Meds:  enoxaparin sodium, 1 mg/kg, Subcutaneous, Q12H  sodium chloride, 10 mL, Intravenous, Q12H  sucralfate, 1 g, Oral, 4x Daily  Vonoprazan Fumarate, 20 mg, Oral, Daily    , Continuous Infusions:  Pharmacy to Dose enoxaparin (LOVENOX),     , PRN Meds:    acetaminophen    senna-docusate sodium **AND** polyethylene  "glycol **AND** bisacodyl **AND** bisacodyl    HYDROmorphone    nitroglycerin    ondansetron ODT **OR** ondansetron    oxyCODONE    Pharmacy to Dose enoxaparin (LOVENOX)    [COMPLETED] Insert Peripheral IV **AND** sodium chloride    sodium chloride    sodium chloride   Allergies:  Prednisone    Subjective     ROS:  Review of Systems     Objective   Vital Signs:   /70 (BP Location: Left arm, Patient Position: Lying)   Pulse 59   Temp 98.4 °F (36.9 °C) (Oral)   Resp 19   Ht 177.8 cm (70\")   Wt 106 kg (232 lb 12.9 oz)   SpO2 96%   BMI 33.40 kg/m²     Physical Exam: (performed by MD)  Physical Exam    Results Review:  Lab Results (last 48 hours)       Procedure Component Value Units Date/Time    Factor II, DNA Analysis [[id number]] Collected: 05/23/25 0928    Specimen: Blood from Arm, Right Updated: 05/23/25 0938    Factor 5 Leiden [[id number]] Collected: 05/23/25 0928    Specimen: Blood from Arm, Right Updated: 05/23/25 0938    Cardiolipin Antibody [[id number]] Collected: 05/23/25 0928    Specimen: Blood from Arm, Right Updated: 05/23/25 0938    Beta-2 Glycoprotein Antibodies [[id number]] Collected: 05/23/25 0928    Specimen: Blood from Arm, Right Updated: 05/23/25 0938    Lupus Anticoagulant [[id number]] Collected: 05/23/25 0928    Specimen: Blood from Arm, Right Updated: 05/23/25 0938    Basic Metabolic Panel [[id number]]  (Abnormal) Collected: 05/23/25 0423    Specimen: Blood from Arm, Right Updated: 05/23/25 0506     Glucose 113 mg/dL      BUN 15 mg/dL      Creatinine 1.05 mg/dL      Sodium 138 mmol/L      Potassium 4.1 mmol/L      Comment: Specimen hemolyzed.  Result may be falsely elevated.        Chloride 103 mmol/L      CO2 26.9 mmol/L      Calcium 9.5 mg/dL      BUN/Creatinine Ratio 14.3     Anion Gap 8.1 mmol/L      eGFR 90.3 mL/min/1.73     Narrative:      GFR Categories in Chronic Kidney Disease (CKD)              GFR Category          GFR (mL/min/1.73)    Interpretation  G1                    90 or " greater        Normal or high (1)  G2                    60-89                Mild decrease (1)  G3a                   45-59                Mild to moderate decrease  G3b                   30-44                Moderate to severe decrease  G4                    15-29                Severe decrease  G5                    14 or less           Kidney failure    (1)In the absence of evidence of kidney disease, neither GFR category G1 or G2 fulfill the criteria for CKD.    eGFR calculation 2021 CKD-EPI creatinine equation, which does not include race as a factor    Lipase [[id number]]  (Normal) Collected: 05/23/25 0423    Specimen: Blood from Arm, Right Updated: 05/23/25 0500     Lipase 33 U/L     Amylase [[id number]]  (Normal) Collected: 05/23/25 0423    Specimen: Blood from Arm, Right Updated: 05/23/25 0500     Amylase 73 U/L     CBC & Differential [[id number]]  (Abnormal) Collected: 05/23/25 0423    Specimen: Blood from Arm, Right Updated: 05/23/25 0434    Narrative:      The following orders were created for panel order CBC & Differential.  Procedure                               Abnormality         Status                     ---------                               -----------         ------                     CBC Auto Differential[[id number]]        Abnormal            Final result                 Please view results for these tests on the individual orders.    CBC Auto Differential [[id number]]  (Abnormal) Collected: 05/23/25 0423    Specimen: Blood from Arm, Right Updated: 05/23/25 0434     WBC 6.25 10*3/mm3      RBC 4.67 10*6/mm3      Hemoglobin 15.4 g/dL      Hematocrit 45.0 %      MCV 96.4 fL      MCH 33.0 pg      MCHC 34.2 g/dL      RDW 11.2 %      RDW-SD 39.8 fl      MPV 10.0 fL      Platelets 203 10*3/mm3      Neutrophil % 56.2 %      Lymphocyte % 28.6 %      Monocyte % 12.3 %      Eosinophil % 2.1 %      Basophil % 0.5 %      Immature Grans % 0.3 %      Neutrophils, Absolute 3.51 10*3/mm3      Lymphocytes,  Absolute 1.79 10*3/mm3      Monocytes, Absolute 0.77 10*3/mm3      Eosinophils, Absolute 0.13 10*3/mm3      Basophils, Absolute 0.03 10*3/mm3      Immature Grans, Absolute 0.02 10*3/mm3      nRBC 0.0 /100 WBC     Sedimentation Rate [[id number]]  (Normal) Collected: 05/22/25 1642    Specimen: Blood Updated: 05/22/25 1835     Sed Rate 9 mm/hr     High Sensitivity Troponin T 1Hr [[id number]] Collected: 05/22/25 1805    Specimen: Blood from Arm, Right Updated: 05/22/25 1833     HS Troponin T <6 ng/L      Troponin T Numeric Delta --     Comment: Unable to calculate.       Narrative:      High Sensitive Troponin T Reference Range:  <14.0 ng/L- Negative Female for AMI  <22.0 ng/L- Negative Male for AMI  >=14 - Abnormal Female indicating possible myocardial injury.  >=22 - Abnormal Male indicating possible myocardial injury.   Clinicians would have to utilize clinical acumen, EKG, Troponin, and serial changes to determine if it is an Acute Myocardial Infarction or myocardial injury due to an underlying chronic condition.         CBC & Differential [[id number]]  (Abnormal) Collected: 05/22/25 1642    Specimen: Blood Updated: 05/22/25 1828    Narrative:      The following orders were created for panel order CBC & Differential.  Procedure                               Abnormality         Status                     ---------                               -----------         ------                     CBC Auto Differential[[id number]]        Abnormal            Final result                 Please view results for these tests on the individual orders.    CBC Auto Differential [[id number]]  (Abnormal) Collected: 05/22/25 1642    Specimen: Blood Updated: 05/22/25 1828     WBC 12.31 10*3/mm3      RBC 5.05 10*6/mm3      Hemoglobin 16.6 g/dL      Hematocrit 48.7 %      MCV 96.4 fL      MCH 32.9 pg      MCHC 34.1 g/dL      RDW 10.8 %      RDW-SD 38.8 fl      MPV 10.5 fL      Platelets 223 10*3/mm3      Neutrophil % 68.6 %      Lymphocyte %  20.9 %      Monocyte % 9.4 %      Eosinophil % 0.6 %      Basophil % 0.2 %      Immature Grans % 0.3 %      Neutrophils, Absolute 8.44 10*3/mm3      Lymphocytes, Absolute 2.57 10*3/mm3      Monocytes, Absolute 1.16 10*3/mm3      Eosinophils, Absolute 0.07 10*3/mm3      Basophils, Absolute 0.03 10*3/mm3      Immature Grans, Absolute 0.04 10*3/mm3      nRBC 0.0 /100 WBC     Comprehensive Metabolic Panel [[id number]]  (Abnormal) Collected: 05/22/25 1642    Specimen: Blood Updated: 05/22/25 1753     Glucose 98 mg/dL      BUN 15 mg/dL      Creatinine 1.10 mg/dL      Sodium 138 mmol/L      Potassium 4.1 mmol/L      Chloride 99 mmol/L      CO2 21.8 mmol/L      Calcium 10.0 mg/dL      Total Protein 7.5 g/dL      Albumin 4.8 g/dL      ALT (SGPT) 21 U/L      AST (SGOT) 26 U/L      Alkaline Phosphatase 51 U/L      Total Bilirubin 1.9 mg/dL      Globulin 2.7 gm/dL      A/G Ratio 1.8 g/dL      BUN/Creatinine Ratio 13.6     Anion Gap 17.2 mmol/L      eGFR 85.4 mL/min/1.73     Narrative:      GFR Categories in Chronic Kidney Disease (CKD)              GFR Category          GFR (mL/min/1.73)    Interpretation  G1                    90 or greater        Normal or high (1)  G2                    60-89                Mild decrease (1)  G3a                   45-59                Mild to moderate decrease  G3b                   30-44                Moderate to severe decrease  G4                    15-29                Severe decrease  G5                    14 or less           Kidney failure    (1)In the absence of evidence of kidney disease, neither GFR category G1 or G2 fulfill the criteria for CKD.    eGFR calculation 2021 CKD-EPI creatinine equation, which does not include race as a factor    High Sensitivity Troponin T [[id number]]  (Normal) Collected: 05/22/25 1642    Specimen: Blood Updated: 05/22/25 1753     HS Troponin T <6 ng/L     Narrative:      High Sensitive Troponin T Reference Range:  <14.0 ng/L- Negative Female for  AMI  <22.0 ng/L- Negative Male for AMI  >=14 - Abnormal Female indicating possible myocardial injury.  >=22 - Abnormal Male indicating possible myocardial injury.   Clinicians would have to utilize clinical acumen, EKG, Troponin, and serial changes to determine if it is an Acute Myocardial Infarction or myocardial injury due to an underlying chronic condition.         Lipase [[id number]]  (Abnormal) Collected: 05/22/25 1642    Specimen: Blood Updated: 05/22/25 1714     Lipase 62 U/L     Extra Tubes [[id number]] Collected: 05/22/25 1642    Specimen: Blood Updated: 05/22/25 1700    Narrative:      The following orders were created for panel order Extra Tubes.  Procedure                               Abnormality         Status                     ---------                               -----------         ------                     Lavender Top[[id number]]                                     Final result               Gold Top - SST[[id number]]                                   Final result               Light Blue Top[[id number]]                                   Final result                 Please view results for these tests on the individual orders.    Lavender Top [[id number]] Collected: 05/22/25 1642    Specimen: Blood Updated: 05/22/25 1700     Extra Tube hold for add-on     Comment: Auto resulted       Gold Top - SST [[id number]] Collected: 05/22/25 1642    Specimen: Blood Updated: 05/22/25 1700     Extra Tube Hold for add-ons.     Comment: Auto resulted.       Light Blue Top [[id number]] Collected: 05/22/25 1642    Specimen: Blood Updated: 05/22/25 1700     Extra Tube Hold for add-ons.     Comment: Auto resulted                Pending Results:     Imaging Reviewed:   CT Angiogram Chest Pulmonary Embolism  Result Date: 5/22/2025  Impression: Acute pulmonary emboli in the right lower lobe segmental and subsegmental pulmonary arteries. No CT evidence of right heart strain. Critical Findings were verbally communicated  with Dr. Jacobo Vigil MD on 5/22/2025 at 6:08 p.m. Electronically Signed: Fredi Vigil MD  5/22/2025 6:06 PM EDT  Workstation ID: [id number]    XR Ribs Right With PA Chest  Result Date: 5/22/2025  Impression: No displaced right rib fracture. No acute chest finding. Electronically Signed: Anne Marie Schwartz MD  5/22/2025 4:51 PM EDT  Workstation ID: [id number]    US Abdomen Limited  Result Date: 5/22/2025  Impression: Normal right upper quadrant ultrasound. Electronically Signed: Bull Dickerson MD  5/22/2025 9:18 AM EDT  Workstation ID: [id number]    CT Abdomen Pelvis Without Contrast  Result Date: 5/22/2025  No acute process demonstrated. No urolithiasis or obstructive uropathy Electronically Signed: Valdez Dowling  5/22/2025 7:56 AM EDT  Workstation ID: [id number]    XR Chest 1 View  Result Date: 5/17/2025  Impression: No active cardiopulmonary disease Electronically Signed: Florin Lopez DO  5/17/2025 5:56 PM EDT  Workstation ID: [id number]          Assessment & Plan   ASSESSMENT  Recurrent unprovoked pulmonary embolisms, previously on Eliquis.    PLAN  Discharge on oral anticoagulation, hypercoagulability  studies have been ordered and are pending.  He will follow-up in our office postdischarge.     Electronically signed by SHAUNA Faith, 05/23/25, 1:47 PM EDT.    Above note was prepared for Dr. Ciro Murdock -physical exam and review of systems were performed by physician. Further evaluation and treatment per Dr Murdock.    5/23/2025: I was asked to see Mr. Odell who came in complaining of pain in the right lower chest.  CT of the abdomen pelvis revealed no abnormalities.  Subsequent to this he had an angiogram of the chest and it reported acute pulmonary emboli in the region of the right lower lobe segmental and subsegmental pulmonary arteries.  There was no evidence of right heart strain.  He also had a duplex scan of both lower extremities that revealed no evidence of deep vein thrombosis.  He  was started on anticoagulation.  He gave a history of a deep vein thrombus that was diagnosed in 2017.  He woke up 1 morning with pain in the left lower extremity.  In the next 1 or 2 days there was progressive edema and he sought attention from an urgent care facility where he was treated with antibiotics with the suspicion of cellulitis.  He sought attention from his primary care physician who did not obtained a Doppler.  This confirmed a thrombus involving the entirety of the saphenous vein extending to the saphenofemoral junction and into the proximal femoral vein.  He was placed on apixaban and took the medication for several months.  He could not be any more specific and I did not find any additional information on the chart.  At the time he underwent extensive testing for an inherited predisposition to thrombosis, including protein C and protein S activity as well as factor V Leiden and prothrombin gene mutation.  All were negative.  He also was tested for lupus anticoagulant, beta-2 glycoprotein antibodies and cardiolipin antibodies which were as well negative.  Approximately 72 hours before his symptoms started this last time he was admitted to the hospital with upper gastrointestinal symptoms.  He stayed in the hospital for approximately 48 hours.  On exam he is a well-built man who does not seem in distress.  He is conversant and oriented.  There is no jaundice.  The lungs are diminished bilaterally and the heart regular.  The abdomen is protuberant and soft.  The liver and spleen are nonenlarged.  There is no edema of the lower extremities.  It is very possible that the recent admission to the hospital is the provocation for the present pulmonary embolism.  However, it is a recurring process that started at a young age which, despite the negative results on the above testing confirms that Mr. Odell has an increased propensity to thrombosis.  On this basis I believe that he should be treated with lifelong  anticoagulation and apixaban, if it is a medication that his insurance covers, would be appropriate.  I do not believe that much more testing is necessary with the exception of repeating lupus anticoagulant, anticardiolipin antibodies and antibeta-2 lipoprotein antibody.  This because, if positive, it would be necessary to treat him with warfarin.  Discussed at great length with him and with his spouse and answered questions.  I discussed and reviewed the record with Ms.Maeve Marley SHAUNA and concur with her note. I formulated the analysis and the plans.     Ciro Murdock MD on 5/23/2025 at 17:06    Electronically signed by Ciro Murdock MD at 05/23/25 1706          Discharge Summary        Renee Pena PA-C at 05/26/25 1207       Attestation signed by Tessie Grewal MD at 05/26/25 2609      I have reviewed this documentation and agree.                        Date of Discharge:  5/26/2025    Discharge Diagnosis: Pulmonary Embolism    Presenting Problem/History of Present Illness  Active Hospital Problems    Diagnosis  POA    **Pulmonary embolism [I26.99]  Yes    History of DVT (deep vein thrombosis) [Z86.718]  Not Applicable    Erosive esophagitis [K22.10]  Yes    KEVON on CPAP [G47.33]  Yes    Elevated lipase [R74.8]  Yes      Resolved Hospital Problems   No resolved problems to display.          Hospital Course  Pleasant 45 y.o. male with history of DVT and erosive esophagitis presented with right sided abdominal pain. Was initially worked up at /ER where CT abd/pelvis without contrast did not show any acute process. RUQ ultrasound was also normal. Symptoms were persistent and upon presentation here his CXR was clear but CT PE revealed a Acute PE  in the segmental and segmental pulmonary arteries of the right lower lobe with no evidence of right heart strain. He does have a history of DVT in 02/2017. He was treated with Lovenox and consulted with hematology. He was transitioned to Eliquis upon discharge  and advised to follow up with hematology in PCP in 1-2 weeks.    Procedures Performed         Consults:   Consults       Date and Time Order Name Status Description    5/22/2025 10:54 PM Inpatient Hematology & Oncology Consult Completed     5/17/2025  8:21 PM IP Consult to Cardiology Completed             Pertinent Test Results:    Lab Results (most recent)       Procedure Component Value Units Date/Time    Basic Metabolic Panel [[id number]]  (Abnormal) Collected: 05/26/25 0033    Specimen: Blood from Arm, Left Updated: 05/26/25 0104     Glucose 103 mg/dL      BUN 14 mg/dL      Creatinine 1.05 mg/dL      Sodium 140 mmol/L      Potassium 4.0 mmol/L      Chloride 103 mmol/L      CO2 26.9 mmol/L      Calcium 9.7 mg/dL      BUN/Creatinine Ratio 13.3     Anion Gap 10.1 mmol/L      eGFR 90.3 mL/min/1.73     Narrative:      GFR Categories in Chronic Kidney Disease (CKD)              GFR Category          GFR (mL/min/1.73)    Interpretation  G1                    90 or greater        Normal or high (1)  G2                    60-89                Mild decrease (1)  G3a                   45-59                Mild to moderate decrease  G3b                   30-44                Moderate to severe decrease  G4                    15-29                Severe decrease  G5                    14 or less           Kidney failure    (1)In the absence of evidence of kidney disease, neither GFR category G1 or G2 fulfill the criteria for CKD.    eGFR calculation 2021 CKD-EPI creatinine equation, which does not include race as a factor    CBC & Differential [[id number]]  (Abnormal) Collected: 05/26/25 0033    Specimen: Blood from Arm, Left Updated: 05/26/25 0042    Narrative:      The following orders were created for panel order CBC & Differential.  Procedure                               Abnormality         Status                     ---------                               -----------         ------                     CBC Auto  Differential[[id number]]        Abnormal            Final result                 Please view results for these tests on the individual orders.    CBC Auto Differential [[id number]]  (Abnormal) Collected: 05/26/25 0033    Specimen: Blood from Arm, Left Updated: 05/26/25 0042     WBC 7.07 10*3/mm3      RBC 4.84 10*6/mm3      Hemoglobin 16.0 g/dL      Hematocrit 46.6 %      MCV 96.3 fL      MCH 33.1 pg      MCHC 34.3 g/dL      RDW 10.8 %      RDW-SD 38.9 fl      MPV 10.3 fL      Platelets 209 10*3/mm3      Neutrophil % 45.7 %      Lymphocyte % 41.4 %      Monocyte % 9.5 %      Eosinophil % 2.4 %      Basophil % 0.7 %      Immature Grans % 0.3 %      Neutrophils, Absolute 3.23 10*3/mm3      Lymphocytes, Absolute 2.93 10*3/mm3      Monocytes, Absolute 0.67 10*3/mm3      Eosinophils, Absolute 0.17 10*3/mm3      Basophils, Absolute 0.05 10*3/mm3      Immature Grans, Absolute 0.02 10*3/mm3      nRBC 0.0 /100 WBC     Basic Metabolic Panel [[id number]]  (Abnormal) Collected: 05/25/25 0457    Specimen: Blood from Arm, Left Updated: 05/25/25 0530     Glucose 100 mg/dL      BUN 11 mg/dL      Creatinine 1.06 mg/dL      Sodium 140 mmol/L      Potassium 4.3 mmol/L      Chloride 103 mmol/L      CO2 26.7 mmol/L      Calcium 9.8 mg/dL      BUN/Creatinine Ratio 10.4     Anion Gap 10.3 mmol/L      eGFR 89.3 mL/min/1.73     Narrative:      GFR Categories in Chronic Kidney Disease (CKD)              GFR Category          GFR (mL/min/1.73)    Interpretation  G1                    90 or greater        Normal or high (1)  G2                    60-89                Mild decrease (1)  G3a                   45-59                Mild to moderate decrease  G3b                   30-44                Moderate to severe decrease  G4                    15-29                Severe decrease  G5                    14 or less           Kidney failure    (1)In the absence of evidence of kidney disease, neither GFR category G1 or G2 fulfill the criteria for  CKD.    eGFR calculation 2021 CKD-EPI creatinine equation, which does not include race as a factor    CBC & Differential [[id number]]  (Abnormal) Collected: 05/25/25 0457    Specimen: Blood from Arm, Left Updated: 05/25/25 0504    Narrative:      The following orders were created for panel order CBC & Differential.  Procedure                               Abnormality         Status                     ---------                               -----------         ------                     CBC Auto Differential[[id number]]        Abnormal            Final result                 Please view results for these tests on the individual orders.    CBC Auto Differential [[id number]]  (Abnormal) Collected: 05/25/25 0457    Specimen: Blood from Arm, Left Updated: 05/25/25 0504     WBC 6.77 10*3/mm3      RBC 5.01 10*6/mm3      Hemoglobin 16.8 g/dL      Hematocrit 48.7 %      MCV 97.2 fL      MCH 33.5 pg      MCHC 34.5 g/dL      RDW 11.0 %      RDW-SD 39.6 fl      MPV 10.1 fL      Platelets 213 10*3/mm3      Neutrophil % 54.4 %      Lymphocyte % 33.4 %      Monocyte % 9.6 %      Eosinophil % 1.9 %      Basophil % 0.6 %      Immature Grans % 0.1 %      Neutrophils, Absolute 3.68 10*3/mm3      Lymphocytes, Absolute 2.26 10*3/mm3      Monocytes, Absolute 0.65 10*3/mm3      Eosinophils, Absolute 0.13 10*3/mm3      Basophils, Absolute 0.04 10*3/mm3      Immature Grans, Absolute 0.01 10*3/mm3      nRBC 0.0 /100 WBC     Lupus Anticoagulant [[id number]] Collected: 05/23/25 0928    Specimen: Blood from Arm, Right Updated: 05/24/25 1909     Dilute Prothrombin Time(dPT) 34.2 sec      dPT Confirm Ratio 0.96 Ratio      Thrombin Time 20.2 sec      PTT Lupus Anticoagulant 33.5 sec      Dilute Viper Venom Time 37.2 sec      Lupus Anticoagulant Reflex Comment:     Comment: No lupus anticoagulant was detected.       Narrative:      Performed at:  [address] [place] [address]  [id number]  : Alyssa Rodriguez MD,  Phone:  [phone]    Cardiolipin Antibody [[id number]] Collected: 05/23/25 0928    Specimen: Blood from Arm, Right Updated: 05/24/25 1611     Anticardiolipin IgG <9 GPL U/mL      Comment:                           Negative:              <15                            Indeterminate:     15 - 20                            Low-Med Positive: >20 - 80                            High Positive:         >80        Anticardiolipin IgM <9 MPL U/mL      Comment:                           Negative:              <13                            Indeterminate:     13 - 20                            Low-Med Positive: >20 - 80                            High Positive:         >80        Anticardiolipin IgA <9 APL U/mL      Comment:                           Negative:              <12                            Indeterminate:     12 - 20                            Low-Med Positive: >20 - 80                            High Positive:         >80       Narrative:      Performed at:  [address] [place] [address]  [id number]  : Serjio Vidal PhD, Phone:  [phone]    Beta-2 Glycoprotein Antibodies [[id number]] Collected: 05/23/25 0928    Specimen: Blood from Arm, Right Updated: 05/23/25 0938    Lipase [[id number]]  (Normal) Collected: 05/23/25 0423    Specimen: Blood from Arm, Right Updated: 05/23/25 0500     Lipase 33 U/L     Amylase [[id number]]  (Normal) Collected: 05/23/25 0423    Specimen: Blood from Arm, Right Updated: 05/23/25 0500     Amylase 73 U/L     Sedimentation Rate [[id number]]  (Normal) Collected: 05/22/25 1642    Specimen: Blood Updated: 05/22/25 1835     Sed Rate 9 mm/hr     High Sensitivity Troponin T 1Hr [[id number]] Collected: 05/22/25 1805    Specimen: Blood from Arm, Right Updated: 05/22/25 1833     HS Troponin T <6 ng/L      Troponin T Numeric Delta --     Comment: Unable to calculate.       Narrative:      High Sensitive Troponin T Reference Range:  <14.0 ng/L- Negative Female for  AMI  <22.0 ng/L- Negative Male for AMI  >=14 - Abnormal Female indicating possible myocardial injury.  >=22 - Abnormal Male indicating possible myocardial injury.   Clinicians would have to utilize clinical acumen, EKG, Troponin, and serial changes to determine if it is an Acute Myocardial Infarction or myocardial injury due to an underlying chronic condition.         Comprehensive Metabolic Panel [[id number]]  (Abnormal) Collected: 05/22/25 1642    Specimen: Blood Updated: 05/22/25 1753     Glucose 98 mg/dL      BUN 15 mg/dL      Creatinine 1.10 mg/dL      Sodium 138 mmol/L      Potassium 4.1 mmol/L      Chloride 99 mmol/L      CO2 21.8 mmol/L      Calcium 10.0 mg/dL      Total Protein 7.5 g/dL      Albumin 4.8 g/dL      ALT (SGPT) 21 U/L      AST (SGOT) 26 U/L      Alkaline Phosphatase 51 U/L      Total Bilirubin 1.9 mg/dL      Globulin 2.7 gm/dL      A/G Ratio 1.8 g/dL      BUN/Creatinine Ratio 13.6     Anion Gap 17.2 mmol/L      eGFR 85.4 mL/min/1.73     Narrative:      GFR Categories in Chronic Kidney Disease (CKD)              GFR Category          GFR (mL/min/1.73)    Interpretation  G1                    90 or greater        Normal or high (1)  G2                    60-89                Mild decrease (1)  G3a                   45-59                Mild to moderate decrease  G3b                   30-44                Moderate to severe decrease  G4                    15-29                Severe decrease  G5                    14 or less           Kidney failure    (1)In the absence of evidence of kidney disease, neither GFR category G1 or G2 fulfill the criteria for CKD.    eGFR calculation 2021 CKD-EPI creatinine equation, which does not include race as a factor    High Sensitivity Troponin T [[id number]]  (Normal) Collected: 05/22/25 1642    Specimen: Blood Updated: 05/22/25 1753     HS Troponin T <6 ng/L     Narrative:      High Sensitive Troponin T Reference Range:  <14.0 ng/L- Negative Female for  AMI  <22.0 ng/L- Negative Male for AMI  >=14 - Abnormal Female indicating possible myocardial injury.  >=22 - Abnormal Male indicating possible myocardial injury.   Clinicians would have to utilize clinical acumen, EKG, Troponin, and serial changes to determine if it is an Acute Myocardial Infarction or myocardial injury due to an underlying chronic condition.         Lipase [[id number]]  (Abnormal) Collected: 05/22/25 1642    Specimen: Blood Updated: 05/22/25 1714     Lipase 62 U/L     Extra Tubes [[id number]] Collected: 05/22/25 1642    Specimen: Blood Updated: 05/22/25 1700    Narrative:      The following orders were created for panel order Extra Tubes.  Procedure                               Abnormality         Status                     ---------                               -----------         ------                     Lavender Top[[id number]]                                     Final result               Gold Top - SST[[id number]]                                   Final result               Light Blue Top[[id number]]                                   Final result                 Please view results for these tests on the individual orders.    Lavender Top [[id number]] Collected: 05/22/25 1642    Specimen: Blood Updated: 05/22/25 1700     Extra Tube hold for add-on     Comment: Auto resulted       Gold Top - SST [[id number]] Collected: 05/22/25 1642    Specimen: Blood Updated: 05/22/25 1700     Extra Tube Hold for add-ons.     Comment: Auto resulted.       Light Blue Top [[id number]] Collected: 05/22/25 1642    Specimen: Blood Updated: 05/22/25 1700     Extra Tube Hold for add-ons.     Comment: Auto resulted                Results for orders placed during the hospital encounter of 05/22/25    Adult Transthoracic Echo Complete W/ Cont if Necessary Per Protocol    Interpretation Summary    Left ventricular ejection fraction appears to be 61 - 65%.    Left ventricular diastolic function was normal.    The right  ventricular cavity is borderline dilated.              Condition on Discharge:  Stable    Vital Signs  Temp:  [97.7 °F (36.5 °C)-98.2 °F (36.8 °C)] 97.9 °F (36.6 °C)  Heart Rate:  [63-85] 67  Resp:  [14-25] 25  BP: (117-128)/(64-80) 121/65    Physical Exam:     General Appearance:    Alert, cooperative, in no acute distress   Head:    Normocephalic, without obvious abnormality, atraumatic   Eyes:            Lids and lashes normal, conjunctivae and sclerae normal, no   icterus, no pallor, corneas clear   Ears:    Ears appear intact with no abnormalities noted   Throat:   No oral lesions, no thrush, oral mucosa moist   Neck:   No adenopathy, supple, trachea midline, no thyromegaly, no   carotid bruit, no JVD   Lungs:     Clear to auscultation,respirations regular, even and                unlabored    Heart:    Regular rhythm and normal rate, normal S1 and S2, no          murmur, no gallop, no rub, no click   Chest Wall:    No abnormalities observed   Abdomen:     Normal bowel sounds, no masses, no organomegaly, soft      non-tender, non-distended, no guarding, no rebound tenderness   Extremities:   Moves all extremities well, no edema, no cyanosis, no redness   Pulses:   Pulses palpable and equal bilaterally   Skin:   No bleeding, bruising or rash   Lymph nodes:   No palpable adenopathy           Discharge Disposition  Home or Self Care    Discharge Medications     Discharge Medications        New Medications        Instructions Start Date   Eliquis 5 MG tablet tablet  Generic drug: apixaban   Take 2 tablets by mouth 2 (Two) Times a Day for 7 days, THEN 1 tablet 2 (Two) Times a Day for 21 days.   Start Date: May 23, 2025            Continue These Medications        Instructions Start Date   ondansetron ODT 4 MG disintegrating tablet  Commonly known as: ZOFRAN-ODT   4 mg, Translingual, Every 8 Hours PRN      sucralfate 1 g tablet  Commonly known as: CARAFATE   1 g, 4 Times Daily      Voquezna 20 MG tablet  Generic  drug: Vonoprazan Fumarate   20 mg, Daily               Discharge Diet:   Diet Instructions       Diet: Regular/House Diet; Regular (IDDSI 7); Thin (IDDSI 0)      Discharge Diet: Regular/House Diet    Texture: Regular (IDDSI 7)    Fluid Consistency: Thin (IDDSI 0)            Activity at Discharge:   Activity Instructions       Activity as Tolerated              Follow-up Appointments  No future appointments.  Additional Instructions for the Follow-ups that You Need to Schedule       Discharge Follow-up with PCP   As directed       Currently Documented PCP:    Luan Gomes MD    PCP Phone Number:    [phone]     Follow Up Details: 1-2 weeks        Discharge Follow-up with Specialty: hematology   As directed      Specialty: hematology                Test Results Pending at Discharge  Pending Results       Procedure [Order ID] Specimen - Date/Time    Beta-2 Glycoprotein Antibodies [[id number]] Collected: 05/23/25 0928    Specimen: Blood from Arm, Right Updated: 05/23/25 0938             Renee Pena PA-C  05/26/25  12:07 EDT    Time: Discharge 25 min          Electronically signed by Tessie Grewal MD at 05/26/25 4558

## 2025-05-27 NOTE — ED PROVIDER NOTES
Subjective   History of Present Illness  42-year-old male recently admitted and found to have right lower lobe segmental and subsequent segmental pulmonary embolus.  The patient was started on Eliquis after IV heparin and was discharged yesterday the patient reports she has been anxious and shaky today.  He has had a cough intermittently productive of some dark-colored sputum.  He reports that he has had no resting shortness of breath and states that shortness of breath did not increase much while he walked into the hospital.  He reports no fever or chills today.  He reports no wheezing.  He denies orthopnea or PND.  He denies leg pain or swelling      Review of Systems   Respiratory:  Positive for chest tightness and shortness of breath.    Neurological:  Positive for tremors.   Psychiatric/Behavioral:  The patient is nervous/anxious.        Past Medical History:   Diagnosis Date    Anxiety     GERD (gastroesophageal reflux disease)        Allergies   Allergen Reactions    Prednisone GI Bleeding     PO prednisone causes GI bleeding per patient       Past Surgical History:   Procedure Laterality Date    ENDOSCOPY N/A 8/12/2024    Procedure: ESOPHAGOGASTRODUODENOSCOPY;  Surgeon: Oseas Pfeiffer MD;  Location: [facility] ENDOSCOPY;  Service: Gastroenterology;  Laterality: N/A;  POST-EROSIVE ESOPHAGITIS       No family history on file.    Social History     Socioeconomic History    Marital status:    Tobacco Use    Smoking status: Former     Types: Cigars    Smokeless tobacco: Former     Types: Chew   Vaping Use    Vaping status: Never Used   Substance and Sexual Activity    Alcohol use: Not Currently    Drug use: Not Currently     Types: Marijuana     Comment: occ    Sexual activity: Defer     Reports no recent usual activity today      Objective   Physical Exam  Alert Amisha Coma Scale 15   HEENT: Pupils equal and reactive to light. Conjunctivae are not injected. Normal tympanic membranes. Oropharynx and  nares are normal.   Neck: Supple. Midline trachea. No JVD. No goiter.   Chest: Acute right lower lobe crackles were identified no wheezes or rhonchi no accessory muscle use or retractions and equal breath sounds bilaterally, regular rate and rhythm without murmur or rub.  No S3 or S4   Abdomen: Positive bowel sounds, nontender, nondistended. No rebound or peritoneal signs. No CVA tenderness.   Extremities no clubbing. cyanosis or edema. Motor sensory exam is normal. The full range of motion is intact   Skin: Warm and dry, no rashes or petechia.   Lymphatic: No regional lymphadenopathy. No calf pain, swelling or Homans sign    Procedures           ED Course        Labs Reviewed   COMPREHENSIVE METABOLIC PANEL - Abnormal; Notable for the following components:       Result Value    Glucose 103 (*)     ALT (SGPT) 75 (*)     AST (SGOT) 49 (*)     All other components within normal limits    Narrative:     GFR Categories in Chronic Kidney Disease (CKD)              GFR Category          GFR (mL/min/1.73)    Interpretation  G1                    90 or greater        Normal or high (1)  G2                    60-89                Mild decrease (1)  G3a                   45-59                Mild to moderate decrease  G3b                   30-44                Moderate to severe decrease  G4                    15-29                Severe decrease  G5                    14 or less           Kidney failure    (1)In the absence of evidence of kidney disease, neither GFR category G1 or G2 fulfill the criteria for CKD.    eGFR calculation 2021 CKD-EPI creatinine equation, which does not include race as a factor   CBC WITH AUTO DIFFERENTIAL - Abnormal; Notable for the following components:    MCH 33.1 (*)     RDW 10.7 (*)     All other components within normal limits   RESPIRATORY PANEL PCR W/ COVID-19 (SARS-COV-2), NP SWAB IN UTM/VTP, 2 HR TAT - Normal    Narrative:     In the setting of a positive respiratory panel with a viral  infection PLUS a negative procalcitonin without other underlying concern for bacterial infection, consider observing off antibiotics or discontinuation of antibiotics and continue supportive care. If the respiratory panel is positive for atypical bacterial infection (Bordetella pertussis, Chlamydophila pneumoniae, or Mycoplasma pneumoniae), consider antibiotic de-escalation to target atypical bacterial infection.   TROPONIN - Normal    Narrative:     High Sensitive Troponin T Reference Range:  <14.0 ng/L- Negative Female for AMI  <22.0 ng/L- Negative Male for AMI  >=14 - Abnormal Female indicating possible myocardial injury.  >=22 - Abnormal Male indicating possible myocardial injury.   Clinicians would have to utilize clinical acumen, EKG, Troponin, and serial changes to determine if it is an Acute Myocardial Infarction or myocardial injury due to an underlying chronic condition.        BNP (IN-HOUSE) - Normal    Narrative:     This assay is used as an aid in the diagnosis of individuals suspected of having heart failure. It can be used as an aid in the diagnosis of acute decompensated heart failure (ADHF) in patients presenting with signs and symptoms of ADHF to the emergency department (ED). In addition, NT-proBNP of <300 pg/mL indicates ADHF is not likely.    Age Range Result Interpretation  NT-proBNP Concentration (pg/mL:      <50             Positive            >450                   Gray                 300-450                    Negative             <300    50-75           Positive            >900                  Gray                300-900                  Negative            <300      >75             Positive            >1800                  Gray                300-1800                  Negative            <300   POC LACTATE - Normal   BLOOD CULTURE   BLOOD CULTURE   HIGH SENSITIVITIY TROPONIN T 1HR    Narrative:     High Sensitive Troponin T Reference Range:  <14.0 ng/L- Negative Female for AMI  <22.0  ng/L- Negative Male for AMI  >=14 - Abnormal Female indicating possible myocardial injury.  >=22 - Abnormal Male indicating possible myocardial injury.   Clinicians would have to utilize clinical acumen, EKG, Troponin, and serial changes to determine if it is an Acute Myocardial Infarction or myocardial injury due to an underlying chronic condition.        POC LACTATE   CBC AND DIFFERENTIAL    Narrative:     The following orders were created for panel order CBC & Differential.  Procedure                               Abnormality         Status                     ---------                               -----------         ------                     CBC Auto Differential[[id number]]        Abnormal            Final result                 Please view results for these tests on the individual orders.   EXTRA TUBES    Narrative:     The following orders were created for panel order Extra Tubes.  Procedure                               Abnormality         Status                     ---------                               -----------         ------                     Light Blue Top[[id number]]                                   Final result                 Please view results for these tests on the individual orders.   LIGHT BLUE TOP     Medications   diazePAM (VALIUM) injection 2.5 mg (2.5 mg Intravenous Given 5/27/25 1355)   methylPREDNISolone sodium succinate (SOLU-Medrol) injection 125 mg (125 mg Intravenous Given 5/27/25 1341)   ipratropium-albuterol (DUO-NEB) nebulizer solution 3 mL (3 mL Nebulization Given 5/27/25 1438)   iopamidol (ISOVUE-370) 76 % injection 100 mL (100 mL Intravenous Given 5/27/25 1409)     CT Angiogram Chest Pulmonary Embolism  Result Date: 5/27/2025  Impression: Interval resolution of the previously identified right lower lobe pulmonary embolism. No new pulmonary embolism is identified. Electronically Signed: Chris Beyer MD  5/27/2025 2:23 PM EDT  Workstation ID: [id number]                                                    Medical Decision Making  Patient was stable CT scan showed heart evidence of improvement the patient was given lorazepam and had resolution of anxiety and the tremor that he arrived with.  He stated he felt much better.  The patient states he cannot take extended courses of oral steroids due to gastric irritation and potentially gastric ulcer disease.  He states has been able tolerate IV steroids in the past difficulty the patient was given a dose in the emergency department the patient will be discharged with a prescription for alprazolam, azithromycin.  The patient advised to continue his Eliquis.  The patient was stable at discharge and vocalized understanding of discharge instructions were    Problems Addressed:  Acute stress reaction: complicated acute illness or injury  History of pulmonary embolus (PE): complicated acute illness or injury  Pleurisy: complicated acute illness or injury    Amount and/or Complexity of Data Reviewed  Independent Historian: friend  Labs: ordered. Decision-making details documented in ED Course.  Radiology: ordered and independent interpretation performed.  ECG/medicine tests: ordered and independent interpretation performed.     Details: Sinus rhythm without acute ischemic or injury pattern.  No S1Q3T3    Risk  Prescription drug management.        Final diagnoses:   History of pulmonary embolus (PE)   Acute stress reaction   Pleurisy       ED Disposition  ED Disposition       ED Disposition   Discharge    Condition   Stable    Comment   --               Luan Gomes MD  [address] [place] [address] [place] [id number] [phone]          Luan Gomes MD  [address] [facility] [place] [address] [phone]               Medication List        New Prescriptions      ALPRAZolam 0.5 MG tablet  Commonly known as: XANAX  Take 1 tablet by mouth 3 (Three) Times a Day As Needed for Anxiety.     azithromycin 500 MG tablet  Commonly known as: ZITHROMAX  Take 1  tablet by mouth Daily.               Where to Get Your Medications        These medications were sent to [facility] [id number] - [place], [place] - [address] [facility] - [phone]  - [phone] [facility] [facility] [place] [address]      Phone: [phone]   ALPRAZolam 0.5 MG tablet  azithromycin 500 MG tablet            Paulo Knapp MD  05/27/25 3394       Paulo Knapp MD  05/27/25 7344

## 2025-05-27 NOTE — CASE MANAGEMENT/SOCIAL WORK
Case Management Discharge Note      Final Note: Routine home         Selected Continued Care - Discharged on 5/26/2025 Admission date: 5/22/2025 - Discharge disposition: Home or Self Care       Transportation Services  Private: Car    Final Discharge Disposition Code: 01 - home or self-care

## 2025-05-31 ENCOUNTER — HOSPITAL ENCOUNTER (OUTPATIENT)
Facility: HOSPITAL | Age: 45
Setting detail: OBSERVATION
Discharge: HOME | End: 2025-06-01
Attending: EMERGENCY MEDICINE | Admitting: INTERNAL MEDICINE
Payer: COMMERCIAL

## 2025-05-31 ENCOUNTER — APPOINTMENT (OUTPATIENT)
Dept: GENERAL RADIOLOGY | Facility: HOSPITAL | Age: 45
End: 2025-05-31
Payer: COMMERCIAL

## 2025-05-31 ENCOUNTER — READMISSION MANAGEMENT (OUTPATIENT)
Dept: CALL CENTER | Facility: HOSPITAL | Age: 45
End: 2025-05-31
Payer: COMMERCIAL

## 2025-05-31 DIAGNOSIS — F43.0 ACUTE STRESS REACTION: ICD-10-CM

## 2025-05-31 DIAGNOSIS — I48.91 ATRIAL FIBRILLATION WITH RVR: Primary | ICD-10-CM

## 2025-05-31 LAB
ALBUMIN SERPL-MCNC: 4.7 G/DL (ref 3.5–5.2)
ALBUMIN/GLOB SERPL: 2 G/DL
ALP SERPL-CCNC: 48 U/L (ref 39–117)
ALT SERPL W P-5'-P-CCNC: 60 U/L (ref 1–41)
ANION GAP SERPL CALCULATED.3IONS-SCNC: 17.8 MMOL/L (ref 5–15)
AST SERPL-CCNC: 17 U/L (ref 1–40)
BASOPHILS # BLD AUTO: 0.02 10*3/MM3 (ref 0–0.2)
BASOPHILS NFR BLD AUTO: 0.3 % (ref 0–1.5)
BILIRUB SERPL-MCNC: 1.1 MG/DL (ref 0–1.2)
BILIRUB UR QL STRIP: NEGATIVE
BUN SERPL-MCNC: 14.5 MG/DL (ref 6–20)
BUN/CREAT SERPL: 12.8 (ref 7–25)
CALCIUM SPEC-SCNC: 10.3 MG/DL (ref 8.6–10.5)
CHLORIDE SERPL-SCNC: 102 MMOL/L (ref 98–107)
CLARITY UR: CLEAR
CO2 SERPL-SCNC: 20.2 MMOL/L (ref 22–29)
COLOR UR: YELLOW
CREAT SERPL-MCNC: 1.13 MG/DL (ref 0.76–1.27)
DEPRECATED RDW RBC AUTO: 38.6 FL (ref 37–54)
EGFRCR SERPLBLD CKD-EPI 2021: 82.7 ML/MIN/1.73
EOSINOPHIL # BLD AUTO: 0.03 10*3/MM3 (ref 0–0.4)
EOSINOPHIL NFR BLD AUTO: 0.4 % (ref 0.3–6.2)
ERYTHROCYTE [DISTWIDTH] IN BLOOD BY AUTOMATED COUNT: 10.8 % (ref 12.3–15.4)
GEN 5 1HR TROPONIN T REFLEX: 8 NG/L
GLOBULIN UR ELPH-MCNC: 2.4 GM/DL
GLUCOSE SERPL-MCNC: 124 MG/DL (ref 65–99)
GLUCOSE UR STRIP-MCNC: NEGATIVE MG/DL
HCT VFR BLD AUTO: 46.7 % (ref 37.5–51)
HGB BLD-MCNC: 16.3 G/DL (ref 13–17.7)
HGB UR QL STRIP.AUTO: NEGATIVE
HOLD SPECIMEN: NORMAL
IMM GRANULOCYTES # BLD AUTO: 0.01 10*3/MM3 (ref 0–0.05)
IMM GRANULOCYTES NFR BLD AUTO: 0.1 % (ref 0–0.5)
KETONES UR QL STRIP: ABNORMAL
LEUKOCYTE ESTERASE UR QL STRIP.AUTO: NEGATIVE
LYMPHOCYTES # BLD AUTO: 1.5 10*3/MM3 (ref 0.7–3.1)
LYMPHOCYTES NFR BLD AUTO: 21 % (ref 19.6–45.3)
MCH RBC QN AUTO: 33.5 PG (ref 26.6–33)
MCHC RBC AUTO-ENTMCNC: 34.9 G/DL (ref 31.5–35.7)
MCV RBC AUTO: 96.1 FL (ref 79–97)
MONOCYTES # BLD AUTO: 0.64 10*3/MM3 (ref 0.1–0.9)
MONOCYTES NFR BLD AUTO: 9 % (ref 5–12)
NEUTROPHILS NFR BLD AUTO: 4.93 10*3/MM3 (ref 1.7–7)
NEUTROPHILS NFR BLD AUTO: 69.2 % (ref 42.7–76)
NITRITE UR QL STRIP: NEGATIVE
PH UR STRIP.AUTO: 8 [PH] (ref 5–8)
PLATELET # BLD AUTO: 249 10*3/MM3 (ref 140–450)
PMV BLD AUTO: 10.3 FL (ref 6–12)
POTASSIUM SERPL-SCNC: 3.8 MMOL/L (ref 3.5–5.2)
PROT SERPL-MCNC: 7.1 G/DL (ref 6–8.5)
PROT UR QL STRIP: NEGATIVE
QT INTERVAL: 341 MS
QTC INTERVAL: 513 MS
RBC # BLD AUTO: 4.86 10*6/MM3 (ref 4.14–5.8)
SODIUM SERPL-SCNC: 140 MMOL/L (ref 136–145)
SP GR UR STRIP: 1.01 (ref 1–1.03)
TROPONIN T NUMERIC DELTA: NORMAL
TROPONIN T SERPL HS-MCNC: <6 NG/L
UROBILINOGEN UR QL STRIP: ABNORMAL
WBC NRBC COR # BLD AUTO: 7.13 10*3/MM3 (ref 3.4–10.8)
WHOLE BLOOD HOLD COAG: NORMAL
WHOLE BLOOD HOLD SPECIMEN: NORMAL

## 2025-05-31 PROCEDURE — 96366 THER/PROPH/DIAG IV INF ADDON: CPT

## 2025-05-31 PROCEDURE — 96376 TX/PRO/DX INJ SAME DRUG ADON: CPT

## 2025-05-31 PROCEDURE — 36415 COLL VENOUS BLD VENIPUNCTURE: CPT

## 2025-05-31 PROCEDURE — 96365 THER/PROPH/DIAG IV INF INIT: CPT

## 2025-05-31 PROCEDURE — 71045 X-RAY EXAM CHEST 1 VIEW: CPT

## 2025-05-31 PROCEDURE — 81003 URINALYSIS AUTO W/O SCOPE: CPT | Performed by: EMERGENCY MEDICINE

## 2025-05-31 PROCEDURE — 99285 EMERGENCY DEPT VISIT HI MDM: CPT | Performed by: EMERGENCY MEDICINE

## 2025-05-31 PROCEDURE — 93005 ELECTROCARDIOGRAM TRACING: CPT | Performed by: EMERGENCY MEDICINE

## 2025-05-31 PROCEDURE — 25810000003 LACTATED RINGERS SOLUTION: Performed by: EMERGENCY MEDICINE

## 2025-05-31 PROCEDURE — 99285 EMERGENCY DEPT VISIT HI MDM: CPT

## 2025-05-31 PROCEDURE — 80053 COMPREHEN METABOLIC PANEL: CPT | Performed by: EMERGENCY MEDICINE

## 2025-05-31 PROCEDURE — 84484 ASSAY OF TROPONIN QUANT: CPT | Performed by: EMERGENCY MEDICINE

## 2025-05-31 PROCEDURE — G0378 HOSPITAL OBSERVATION PER HR: HCPCS

## 2025-05-31 PROCEDURE — 85025 COMPLETE CBC W/AUTO DIFF WBC: CPT | Performed by: EMERGENCY MEDICINE

## 2025-05-31 RX ORDER — ALPRAZOLAM 0.5 MG
0.5 TABLET ORAL 3 TIMES DAILY PRN
Status: DISCONTINUED | OUTPATIENT
Start: 2025-05-31 | End: 2025-06-01 | Stop reason: HOSPADM

## 2025-05-31 RX ORDER — ACETAMINOPHEN 325 MG/1
650 TABLET ORAL EVERY 4 HOURS PRN
Status: DISCONTINUED | OUTPATIENT
Start: 2025-05-31 | End: 2025-06-01 | Stop reason: HOSPADM

## 2025-05-31 RX ORDER — SODIUM CHLORIDE 0.9 % (FLUSH) 0.9 %
10 SYRINGE (ML) INJECTION AS NEEDED
Status: DISCONTINUED | OUTPATIENT
Start: 2025-05-31 | End: 2025-06-01 | Stop reason: HOSPADM

## 2025-05-31 RX ORDER — DILTIAZEM HCL/D5W 125 MG/125
5-15 PLASTIC BAG, INJECTION (ML) INTRAVENOUS
Status: DISCONTINUED | OUTPATIENT
Start: 2025-05-31 | End: 2025-06-01

## 2025-05-31 RX ORDER — ONDANSETRON 2 MG/ML
4 INJECTION INTRAMUSCULAR; INTRAVENOUS EVERY 6 HOURS PRN
Status: DISCONTINUED | OUTPATIENT
Start: 2025-05-31 | End: 2025-06-01 | Stop reason: HOSPADM

## 2025-05-31 RX ORDER — BISACODYL 10 MG
10 SUPPOSITORY, RECTAL RECTAL DAILY PRN
Status: DISCONTINUED | OUTPATIENT
Start: 2025-05-31 | End: 2025-06-01 | Stop reason: HOSPADM

## 2025-05-31 RX ORDER — ACETAMINOPHEN 650 MG/1
650 SUPPOSITORY RECTAL EVERY 4 HOURS PRN
Status: DISCONTINUED | OUTPATIENT
Start: 2025-05-31 | End: 2025-06-01 | Stop reason: HOSPADM

## 2025-05-31 RX ORDER — NITROGLYCERIN 0.4 MG/1
0.4 TABLET SUBLINGUAL
Status: DISCONTINUED | OUTPATIENT
Start: 2025-05-31 | End: 2025-06-01 | Stop reason: HOSPADM

## 2025-05-31 RX ORDER — DILTIAZEM HYDROCHLORIDE 5 MG/ML
0.25 INJECTION INTRAVENOUS ONCE
Status: COMPLETED | OUTPATIENT
Start: 2025-05-31 | End: 2025-05-31

## 2025-05-31 RX ORDER — AMOXICILLIN 250 MG
2 CAPSULE ORAL 2 TIMES DAILY PRN
Status: DISCONTINUED | OUTPATIENT
Start: 2025-05-31 | End: 2025-06-01 | Stop reason: HOSPADM

## 2025-05-31 RX ORDER — SODIUM CHLORIDE 0.9 % (FLUSH) 0.9 %
10 SYRINGE (ML) INJECTION EVERY 12 HOURS SCHEDULED
Status: DISCONTINUED | OUTPATIENT
Start: 2025-05-31 | End: 2025-06-01 | Stop reason: HOSPADM

## 2025-05-31 RX ORDER — SUCRALFATE 1 G/1
1 TABLET ORAL 4 TIMES DAILY
Status: DISCONTINUED | OUTPATIENT
Start: 2025-05-31 | End: 2025-06-01 | Stop reason: HOSPADM

## 2025-05-31 RX ORDER — SODIUM CHLORIDE 9 MG/ML
40 INJECTION, SOLUTION INTRAVENOUS AS NEEDED
Status: DISCONTINUED | OUTPATIENT
Start: 2025-05-31 | End: 2025-06-01 | Stop reason: HOSPADM

## 2025-05-31 RX ORDER — ACETAMINOPHEN 160 MG/5ML
650 SOLUTION ORAL EVERY 4 HOURS PRN
Status: DISCONTINUED | OUTPATIENT
Start: 2025-05-31 | End: 2025-06-01 | Stop reason: HOSPADM

## 2025-05-31 RX ORDER — BISACODYL 5 MG/1
5 TABLET, DELAYED RELEASE ORAL DAILY PRN
Status: DISCONTINUED | OUTPATIENT
Start: 2025-05-31 | End: 2025-06-01 | Stop reason: HOSPADM

## 2025-05-31 RX ORDER — POLYETHYLENE GLYCOL 3350 17 G/17G
17 POWDER, FOR SOLUTION ORAL DAILY PRN
Status: DISCONTINUED | OUTPATIENT
Start: 2025-05-31 | End: 2025-06-01 | Stop reason: HOSPADM

## 2025-05-31 RX ADMIN — ALPRAZOLAM 0.5 MG: 0.5 TABLET ORAL at 18:11

## 2025-05-31 RX ADMIN — Medication 5 MG/HR: at 08:52

## 2025-05-31 RX ADMIN — Medication 10 ML: at 21:05

## 2025-05-31 RX ADMIN — SODIUM CHLORIDE, SODIUM LACTATE, POTASSIUM CHLORIDE, CALCIUM CHLORIDE 500 ML: 600; 310; 30; 20 INJECTION, SOLUTION INTRAVENOUS at 08:45

## 2025-05-31 RX ADMIN — ACETAMINOPHEN 650 MG: 325 TABLET, FILM COATED ORAL at 18:13

## 2025-05-31 RX ADMIN — APIXABAN 5 MG: 5 TABLET, FILM COATED ORAL at 21:04

## 2025-05-31 RX ADMIN — SODIUM CHLORIDE, SODIUM LACTATE, POTASSIUM CHLORIDE, AND CALCIUM CHLORIDE 500 ML: .6; .31; .03; .02 INJECTION, SOLUTION INTRAVENOUS at 09:04

## 2025-05-31 RX ADMIN — DILTIAZEM HYDROCHLORIDE 25 MG: 5 INJECTION, SOLUTION INTRAVENOUS at 08:49

## 2025-05-31 RX ADMIN — Medication 10 MG/HR: at 22:00

## 2025-05-31 NOTE — FSED PROVIDER NOTE
Subjective   History of Present Illness  45-year-old male with recent diagnosis of right sided PE on the 22nd, was admitted put on Eliquis at home, he Donald presented at the [place] [facility] on the 27th for shortness of breath, was evaluated including a repeat CT PE protocol which showed resolution of clot in his lung.  Was sent home doing well until today when he started having palpitations,some sharp chest pain from the palpitation, no crushing or pressure like pain, no confusion  Review of Systems  As noted in HPI  Past Medical History:   Diagnosis Date    Anxiety     GERD (gastroesophageal reflux disease)        Allergies   Allergen Reactions    Prednisone GI Bleeding     PO prednisone causes GI bleeding per patient       Past Surgical History:   Procedure Laterality Date    ENDOSCOPY N/A 8/12/2024    Procedure: ESOPHAGOGASTRODUODENOSCOPY;  Surgeon: Oseas Pfeiffer MD;  Location: [facility] ENDOSCOPY;  Service: Gastroenterology;  Laterality: N/A;  POST-EROSIVE ESOPHAGITIS       No family history on file.    Social History     Socioeconomic History    Marital status:    Tobacco Use    Smoking status: Former     Types: Cigars    Smokeless tobacco: Former     Types: Chew   Vaping Use    Vaping status: Never Used   Substance and Sexual Activity    Alcohol use: Not Currently    Drug use: Not Currently     Types: Marijuana     Comment: occ    Sexual activity: Defer           Objective   Physical Exam    INITIAL VITAL SIGNS: Reviewed by me.  Pulse ox normal  GENERAL: Alert. Well developed and well nourished. No respiratory distress.  HEAD: Normocephalic.   EYES: No conjunctival injection.  ENT: Oral mucosa is moist.  NECK/BACK: Supple. Full range of motion.  RESPIRATORY: Non-labored respirations.  CARDIO: Tachycardic with irregularly irregular rhythm  EXTREMITIES: No deformity.  SKIN: Warm and dry. No rashes. No diaphoresis.  NEUROLOGIC: Alert. Normal gait    Procedures  EKG         EKG time/Interp time:  0833/0840  Rhythm/Rate: Atrial fibrillation rate of 136  P waves and LA:   QRS, axis: Incomplete right bundle branch block  ST and T waves: No ST elevations or depressions concerning for acute ischemia  Independently interpreted by me contemporaneously with treatment           ED Course  ED Course as of 05/31/25 1402   Sat May 31, 2025   0852 Patient with palpitations, EKG shows A-fib with RVR, looking at recent chart, was diagnosed with PE on the 22nd, had some chest pain and shortness of breath on the 27th was seen at [place], had repeat CT PE which showed resolution of right sided PE with no other PEs identified.  This morning he felt a little bit short of breath was using albuterol inhaler after that he said he really noticed some palpitations and felt more short of breath.  On exam he does have A-fib RVR but oxygenation is 100% he is in no distress.  Will give him some diltiazem check labs chest x-ray keep him on the monitor.   [RO]   1015 Troponins are negative labs are overall unremarkable chest x-ray which have intermittently interpreted without pneumothorax or pneumonia.  His heart rate is controlled on the dill drip at the 110s to 120 range.  He does jump up at times but not very often.  Discussed with Dr. Grewal who will accept for admission. [RO]   1401 Patient complaining nursing staff that he was still having some pain, went into discussed with him he says no different than when he got here his discharge is mild he feels like it is related to the palpitations. [RO]      ED Course User Index  [RO] Leonardo Adkins MD                                           Medical Decision Making  Problems Addressed:  Atrial fibrillation with RVR: complicated acute illness or injury    Amount and/or Complexity of Data Reviewed  Labs: ordered. Decision-making details documented in ED Course.  Radiology: ordered. Decision-making details documented in ED Course.  ECG/medicine tests: ordered and independent interpretation  performed. Decision-making details documented in ED Course.  Discussion of management or test interpretation with external provider(s): Dr. Coker    Risk  Prescription drug management.  Decision regarding hospitalization.        Final diagnoses:   Atrial fibrillation with RVR       ED Disposition  ED Disposition       ED Disposition   Decision to Admit    Condition   --    Comment   Level of Care: Progressive Care [20]   Admitting Physician: YIFAN COKER [[id number]]   Patient Class: Inpatient [101]                 No follow-up provider specified.       Medication List      No changes were made to your prescriptions during this visit.

## 2025-05-31 NOTE — H&P
Patient Care Team:  Luan Gomes MD as PCP - General (Family Medicine)    Chief complaint palpitations    Subjective     Patient is a 45 y.o. male with history of recent pulmonary embolism, treated with Eliquis, who presented to [facility] ER with sudden onset of palpitations and heart rate in the 160s.  Symptoms started about an hour after doing an albuterol nebulizer treatment.  He has no prior history of arrhythmias.  He has sleep apnea that he treats with CPAP.  He has been compliant with anticoagulation for pulmonary embolism.  He does not use caffeine or any other stimulants.  EKG confirmed atrial fibs with rapid ventricular rate.  Heart rate has slowed with diltiazem drip but he remains in atrial fib at the time of transfer to this facility.    Review of Systems   Constitutional:  Negative for activity change, appetite change, chills, diaphoresis, fatigue and fever.   HENT:  Negative for congestion and facial swelling.    Eyes:  Negative for visual disturbance.   Respiratory:  Positive for shortness of breath. Negative for cough, wheezing and stridor.    Cardiovascular:  Positive for chest pain and palpitations. Negative for leg swelling.   Gastrointestinal:  Negative for abdominal pain, constipation, diarrhea and nausea.   Genitourinary:  Negative for dysuria.   Musculoskeletal:  Negative for arthralgias, back pain and gait problem.   Neurological:  Negative for numbness.   Psychiatric/Behavioral:  Negative for agitation and confusion. The patient is nervous/anxious.           History  Past Medical History:   Diagnosis Date    Anxiety     GERD (gastroesophageal reflux disease)     Sleep apnea      Past Surgical History:   Procedure Laterality Date    ENDOSCOPY N/A 8/12/2024    Procedure: ESOPHAGOGASTRODUODENOSCOPY;  Surgeon: Oseas Pfeiffer MD;  Location: [facility] ENDOSCOPY;  Service: Gastroenterology;  Laterality: N/A;  POST-EROSIVE ESOPHAGITIS     Family History   Problem Relation Age of  Onset    Thyroid disease Father     Diabetes Father      Social History     Tobacco Use    Smoking status: Former     Types: Cigars    Smokeless tobacco: Former     Types: Chew   Vaping Use    Vaping status: Never Used   Substance Use Topics    Alcohol use: Not Currently    Drug use: Not Currently     Types: Marijuana     Comment: occ     Medications Prior to Admission   Medication Sig Dispense Refill Last Dose/Taking    ALPRAZolam (XANAX) 0.5 MG tablet Take 1 tablet by mouth 3 (Three) Times a Day As Needed for Anxiety. 10 tablet 0 5/30/2025 at  7:30 AM    apixaban (ELIQUIS) 5 MG tablet tablet Take 2 tablets by mouth 2 (Two) Times a Day for 7 days, THEN 1 tablet 2 (Two) Times a Day for 21 days. 70 tablet 0 5/31/2025 at  7:30 AM    azithromycin (ZITHROMAX) 500 MG tablet Take 1 tablet by mouth Daily. 3 tablet 0 5/30/2025 at  7:30 AM    ondansetron ODT (ZOFRAN-ODT) 4 MG disintegrating tablet Place 1 tablet on the tongue Every 8 (Eight) Hours As Needed for Nausea or Vomiting. 15 tablet 0 Past Week at  8:00 AM    Vonoprazan Fumarate (Voquezna) 20 MG tablet Take 1 tablet by mouth Daily.   5/30/2025 at 12:00 PM     Allergies:  Prednisone    Objective     Vital Signs  Temp:  [98 °F (36.7 °C)-98.3 °F (36.8 °C)] 98 °F (36.7 °C)  Heart Rate:  [] 84  Resp:  [13-26] 13  BP: (100-140)/(64-87) 124/87     Physical Exam:      General Appearance:    Alert, cooperative, in no acute distress, anxious   Head:    Normocephalic, without obvious abnormality, atraumatic   Eyes:            Lids and lashes normal, conjunctivae and sclerae normal, no   icterus, no pallor, corneas clear, PERRLA   Ears:    Ears appear intact with no abnormalities noted   Throat:   No oral lesions, no thrush, oral mucosa moist   Neck:   No adenopathy, supple, trachea midline, no thyromegaly, no   carotid bruit, no JVD   Lungs:     Clear to auscultation,respirations regular, even and                  unlabored    Heart:  Irregularly irregular   Chest Wall:     No abnormalities observed   Abdomen:     Normal bowel sounds, no masses, no organomegaly, soft        non-tender, non-distended, no guarding, no rebound                tenderness   Extremities:   Moves all extremities well, no edema, no cyanosis, no             redness   Pulses:   Pulses palpable and equal bilaterally   Skin:   No bleeding, bruising or rash   Lymph nodes:   No palpable adenopathy   Neurologic:   Cranial nerves 2 - 12 grossly intact, sensation intact, DTR       present and equal bilaterally       Results Review:     Imaging Results (Last 24 Hours)       Procedure Component Value Units Date/Time    XR Chest 1 View [[id number]] Collected: 05/31/25 1013     Updated: 05/31/25 1016    Narrative:      XR CHEST 1 VW    Date of Exam: 5/31/2025 9:35 AM EDT    Indication: Atrial fibrillation    Comparison: 5/22/2025.    Findings:  The heart size is normal. The pulmonary vascular markings are normal. The lungs and pleural spaces are clear of active disease. There are degenerative changes in the bony thorax.      Impression:      Impression:  No active disease.        Electronically Signed: Dmitriy Mckinney MD    5/31/2025 10:14 AM EDT    Workstation ID: [id number]             Lab Results (last 24 hours)       Procedure Component Value Units Date/Time    High Sensitivity Troponin T 1Hr [[id number]] Collected: 05/31/25 0938    Specimen: Blood Updated: 05/31/25 0959     HS Troponin T 8 ng/L      Troponin T Numeric Delta --     Comment: Unable to calculate.       Narrative:      High Sensitive Troponin T Reference Range:  <14.0 ng/L- Negative Female for AMI  <22.0 ng/L- Negative Male for AMI  >=14 - Abnormal Female indicating possible myocardial injury.  >=22 - Abnormal Male indicating possible myocardial injury.   Clinicians would have to utilize clinical acumen, EKG, Troponin, and serial changes to determine if it is an Acute Myocardial Infarction or myocardial injury due to an underlying chronic condition.          Urinalysis without microscopic (no culture) - Urine, Clean Catch [[id number]]  (Abnormal) Collected: 05/31/25 0935    Specimen: Urine, Clean Catch Updated: 05/31/25 0945     Color, UA Yellow     Appearance, UA Clear     pH, UA 8.0     Specific Gravity, UA 1.010     Glucose, UA Negative     Ketones, UA Trace     Bilirubin, UA Negative     Blood, UA Negative     Protein, UA Negative     Leuk Esterase, UA Negative     Nitrite, UA Negative     Urobilinogen, UA 0.2 E.U./dL    Comprehensive Metabolic Panel [[id number]]  (Abnormal) Collected: 05/31/25 0836    Specimen: Blood Updated: 05/31/25 0910     Glucose 124 mg/dL      BUN 14.5 mg/dL      Creatinine 1.13 mg/dL      Sodium 140 mmol/L      Comment: Ran on EPOC        Potassium 3.8 mmol/L      Chloride 102 mmol/L      CO2 20.2 mmol/L      Calcium 10.3 mg/dL      Total Protein 7.1 g/dL      Albumin 4.7 g/dL      ALT (SGPT) 60 U/L      AST (SGOT) 17 U/L      Alkaline Phosphatase 48 U/L      Total Bilirubin 1.1 mg/dL      Globulin 2.4 gm/dL      A/G Ratio 2.0 g/dL      BUN/Creatinine Ratio 12.8     Anion Gap 17.8 mmol/L      eGFR 82.7 mL/min/1.73     Narrative:      GFR Categories in Chronic Kidney Disease (CKD)              GFR Category          GFR (mL/min/1.73)    Interpretation  G1                    90 or greater        Normal or high (1)  G2                    60-89                Mild decrease (1)  G3a                   45-59                Mild to moderate decrease  G3b                   30-44                Moderate to severe decrease  G4                    15-29                Severe decrease  G5                    14 or less           Kidney failure    (1)In the absence of evidence of kidney disease, neither GFR category G1 or G2 fulfill the criteria for CKD.    eGFR calculation 2021 CKD-EPI creatinine equation, which does not include race as a factor    High Sensitivity Troponin T [[id number]]  (Normal) Collected: 05/31/25 0836    Specimen: Blood Updated: 05/31/25  0903     HS Troponin T <6 ng/L     Narrative:      High Sensitive Troponin T Reference Range:  <14.0 ng/L- Negative Female for AMI  <22.0 ng/L- Negative Male for AMI  >=14 - Abnormal Female indicating possible myocardial injury.  >=22 - Abnormal Male indicating possible myocardial injury.   Clinicians would have to utilize clinical acumen, EKG, Troponin, and serial changes to determine if it is an Acute Myocardial Infarction or myocardial injury due to an underlying chronic condition.         [place] Draw [[id number]] Collected: 05/31/25 0836    Specimen: Blood Updated: 05/31/25 0851    Narrative:      The following orders were created for panel order [place] Draw.  Procedure                               Abnormality         Status                     ---------                               -----------         ------                     Green Top (Gel)[[id number]]                                  Final result               Lavender Top[[id number]]                                     Final result               Gold Top - SST[[id number]]                                                              Light Blue Top[[id number]]                                   Final result               Green Top (Gel)[[id number]]                                                               Please view results for these tests on the individual orders.    Green Top (Gel) [[id number]] Collected: 05/31/25 0836    Specimen: Blood Updated: 05/31/25 0845     Extra Tube Hold for add-ons.     Comment: Auto resulted.       Lavender Top [[id number]] Collected: 05/31/25 0836    Specimen: Blood Updated: 05/31/25 0845     Extra Tube hold for add-on     Comment: Auto resulted       Light Blue Top [[id number]] Collected: 05/31/25 0836    Specimen: Blood Updated: 05/31/25 0845     Extra Tube Hold for add-ons.     Comment: Auto resulted       CBC & Differential [[id number]]  (Abnormal) Collected: 05/31/25 0836    Specimen: Blood Updated: 05/31/25 0841     Narrative:      The following orders were created for panel order CBC & Differential.  Procedure                               Abnormality         Status                     ---------                               -----------         ------                     CBC Auto Differential[[id number]]        Abnormal            Final result                 Please view results for these tests on the individual orders.    CBC Auto Differential [[id number]]  (Abnormal) Collected: 05/31/25 0836    Specimen: Blood Updated: 05/31/25 0841     WBC 7.13 10*3/mm3      RBC 4.86 10*6/mm3      Hemoglobin 16.3 g/dL      Hematocrit 46.7 %      MCV 96.1 fL      MCH 33.5 pg      MCHC 34.9 g/dL      RDW 10.8 %      RDW-SD 38.6 fl      MPV 10.3 fL      Platelets 249 10*3/mm3      Neutrophil % 69.2 %      Lymphocyte % 21.0 %      Monocyte % 9.0 %      Eosinophil % 0.4 %      Basophil % 0.3 %      Immature Grans % 0.1 %      Neutrophils, Absolute 4.93 10*3/mm3      Lymphocytes, Absolute 1.50 10*3/mm3      Monocytes, Absolute 0.64 10*3/mm3      Eosinophils, Absolute 0.03 10*3/mm3      Basophils, Absolute 0.02 10*3/mm3      Immature Grans, Absolute 0.01 10*3/mm3              I reviewed the patient's new clinical results.    Assessment & Plan       New onset atrial fibrillation    Gastroesophageal reflux disease without esophagitis    Pulmonary embolism    History of DVT (deep vein thrombosis)    Erosive esophagitis    KEVON on CPAP    - Echo was done last week because of pulmonary embolism; TFTs are normal.  Cardiology consult placed.  Continue diltiazem drip.  He is already anticoagulated because of recent pulmonary embolism.  If he does not convert spontaneously back to sinus rhythm may consider cardioversion.  -Xanax as needed for anxiety  -CPAP for KEVON  -Home medication for acid reflux    CODE STATUS:  Code status (Patient has no pulse and is not breathing):  CPR (Attempt to Resuscitate)  Medical Interventions (Patient has pulse or is breathing):   Full Support  Level of Support Discussed with:  Patient    Admission Status:  I believe this patient meets observation status    Expected length of stay:  1 midnights or greater    I discussed the patient's findings and my recommendations with patient.     Tessie Grewal MD  05/31/25  17:25 EDT

## 2025-05-31 NOTE — ED NOTES
Spoke with [facility] to put in for pt transport. They are going on a run from [facility] to [place] and will pick patient up on way back.

## 2025-05-31 NOTE — ED NOTES
Patient states that he needs to urinate; states that it is a feeling of fullness and pain. Assisted patient to standing with a urinal; unable to void. Bedside commode brought to bedside; assisted to a sitted position. Wife at bedside.

## 2025-06-01 VITALS
RESPIRATION RATE: 11 BRPM | HEART RATE: 66 BPM | SYSTOLIC BLOOD PRESSURE: 113 MMHG | WEIGHT: 225.09 LBS | HEIGHT: 70 IN | DIASTOLIC BLOOD PRESSURE: 76 MMHG | BODY MASS INDEX: 32.22 KG/M2 | OXYGEN SATURATION: 98 % | TEMPERATURE: 97.7 F

## 2025-06-01 LAB
BACTERIA SPEC AEROBE CULT: NORMAL
BACTERIA SPEC AEROBE CULT: NORMAL

## 2025-06-01 PROCEDURE — 99204 OFFICE O/P NEW MOD 45 MIN: CPT | Performed by: INTERNAL MEDICINE

## 2025-06-01 PROCEDURE — 96366 THER/PROPH/DIAG IV INF ADDON: CPT

## 2025-06-01 PROCEDURE — G0378 HOSPITAL OBSERVATION PER HR: HCPCS

## 2025-06-01 RX ORDER — METOPROLOL TARTRATE 25 MG/1
12.5 TABLET, FILM COATED ORAL EVERY 12 HOURS SCHEDULED
Qty: 30 TABLET | Refills: 2 | Status: SHIPPED | OUTPATIENT
Start: 2025-06-01

## 2025-06-01 RX ORDER — ALPRAZOLAM 0.5 MG
0.5 TABLET ORAL 3 TIMES DAILY PRN
Qty: 10 TABLET | Refills: 0 | Status: SHIPPED | OUTPATIENT
Start: 2025-06-01

## 2025-06-01 RX ORDER — METOPROLOL TARTRATE 25 MG/1
25 TABLET, FILM COATED ORAL EVERY 12 HOURS SCHEDULED
Status: DISCONTINUED | OUTPATIENT
Start: 2025-06-01 | End: 2025-06-01

## 2025-06-01 RX ADMIN — ACETAMINOPHEN 650 MG: 325 TABLET, FILM COATED ORAL at 06:49

## 2025-06-01 RX ADMIN — ALPRAZOLAM 0.5 MG: 0.5 TABLET ORAL at 13:13

## 2025-06-01 RX ADMIN — Medication 12.5 MG: at 12:33

## 2025-06-01 RX ADMIN — Medication 10 ML: at 08:13

## 2025-06-01 RX ADMIN — SUCRALFATE 1 G: 1 TABLET ORAL at 08:13

## 2025-06-01 RX ADMIN — APIXABAN 5 MG: 5 TABLET, FILM COATED ORAL at 08:13

## 2025-06-01 NOTE — PLAN OF CARE
Problem: Adult Inpatient Plan of Care  Goal: Plan of Care Review  Outcome: Progressing  Goal: Patient-Specific Goal (Individualized)  Outcome: Progressing  Goal: Absence of Hospital-Acquired Illness or Injury  Outcome: Progressing  Intervention: Identify and Manage Fall Risk  Recent Flowsheet Documentation  Taken 6/1/2025 0600 by Zulay Smith LPN  Safety Promotion/Fall Prevention:   activity supervised   assistive device/personal items within reach   clutter free environment maintained   fall prevention program maintained   lighting adjusted   mobility aid in reach   muscle strengthening facilitated   nonskid shoes/slippers when out of bed   room organization consistent   safety round/check completed  Taken 6/1/2025 0500 by Zulay Smith LPN  Safety Promotion/Fall Prevention:   activity supervised   assistive device/personal items within reach   clutter free environment maintained   fall prevention program maintained   lighting adjusted   mobility aid in reach   muscle strengthening facilitated   nonskid shoes/slippers when out of bed   room organization consistent   safety round/check completed  Taken 6/1/2025 0400 by Zulay Smith LPN  Safety Promotion/Fall Prevention:   activity supervised   assistive device/personal items within reach   clutter free environment maintained   fall prevention program maintained   lighting adjusted   mobility aid in reach   muscle strengthening facilitated   nonskid shoes/slippers when out of bed   room organization consistent   safety round/check completed  Taken 6/1/2025 0200 by Zulay Smith LPN  Safety Promotion/Fall Prevention:   activity supervised   assistive device/personal items within reach   clutter free environment maintained   fall prevention program maintained   lighting adjusted   mobility aid in reach   muscle strengthening facilitated   nonskid shoes/slippers when out of bed   room organization consistent   safety round/check completed  Taken  6/1/2025 0000 by Smith, Zulay, LPN  Safety Promotion/Fall Prevention:   activity supervised   assistive device/personal items within reach   clutter free environment maintained   lighting adjusted   muscle strengthening facilitated   nonskid shoes/slippers when out of bed   room organization consistent   safety round/check completed  Taken 5/31/2025 2200 by Zulay Smith LPN  Safety Promotion/Fall Prevention:   activity supervised   assistive device/personal items within reach   clutter free environment maintained   fall prevention program maintained   lighting adjusted   mobility aid in reach   muscle strengthening facilitated   nonskid shoes/slippers when out of bed   room organization consistent   safety round/check completed  Taken 5/31/2025 2000 by Zulay Smith LPN  Safety Promotion/Fall Prevention:   activity supervised   assistive device/personal items within reach   clutter free environment maintained   lighting adjusted   muscle strengthening facilitated   nonskid shoes/slippers when out of bed   room organization consistent   safety round/check completed  Intervention: Prevent Skin Injury  Recent Flowsheet Documentation  Taken 6/1/2025 0400 by Zulay Smith LPN  Body Position: position changed independently  Skin Protection: transparent dressing maintained  Taken 6/1/2025 0000 by Zulay Smith LPN  Body Position: position changed independently  Skin Protection: transparent dressing maintained  Taken 5/31/2025 2000 by Zulay Smith LPN  Body Position: position changed independently  Skin Protection: transparent dressing maintained  Intervention: Prevent and Manage VTE (Venous Thromboembolism) Risk  Recent Flowsheet Documentation  Taken 6/1/2025 0400 by Zulay Smith LPN  VTE Prevention/Management:   bilateral   SCDs (sequential compression devices) off   patient refused intervention  Taken 5/31/2025 2000 by Zulay Smith LPN  VTE Prevention/Management:   bilateral   SCDs  (sequential compression devices) off   patient refused intervention  Intervention: Prevent Infection  Recent Flowsheet Documentation  Taken 6/1/2025 0600 by Zulay Smith LPN  Infection Prevention:   environmental surveillance performed   hand hygiene promoted   personal protective equipment utilized   rest/sleep promoted   single patient room provided  Taken 6/1/2025 0500 by Zulay Smith LPN  Infection Prevention:   environmental surveillance performed   hand hygiene promoted   personal protective equipment utilized   rest/sleep promoted   single patient room provided  Taken 6/1/2025 0400 by Zulay Smith LPN  Infection Prevention:   environmental surveillance performed   hand hygiene promoted   personal protective equipment utilized   rest/sleep promoted   single patient room provided  Taken 6/1/2025 0200 by Zulay Smith LPN  Infection Prevention:   environmental surveillance performed   hand hygiene promoted   personal protective equipment utilized   rest/sleep promoted   single patient room provided  Taken 6/1/2025 0000 by Zulay Smith LPN  Infection Prevention:   environmental surveillance performed   hand hygiene promoted   personal protective equipment utilized   rest/sleep promoted   single patient room provided  Taken 5/31/2025 2300 by Zulay Smith LPN  Infection Prevention:   environmental surveillance performed   hand hygiene promoted   personal protective equipment utilized   rest/sleep promoted   single patient room provided  Taken 5/31/2025 2200 by Zulay Smith LPN  Infection Prevention:   environmental surveillance performed   hand hygiene promoted   personal protective equipment utilized   rest/sleep promoted   single patient room provided  Taken 5/31/2025 2100 by Zulay Smith LPN  Infection Prevention:   environmental surveillance performed   hand hygiene promoted   personal protective equipment utilized   rest/sleep promoted   single patient room  provided  Taken 5/31/2025 2000 by Zulay Smith LPN  Infection Prevention:   environmental surveillance performed   hand hygiene promoted   personal protective equipment utilized   rest/sleep promoted   single patient room provided  Taken 5/31/2025 1900 by Zulay Smith LPN  Infection Prevention:   environmental surveillance performed   hand hygiene promoted   personal protective equipment utilized   rest/sleep promoted   single patient room provided  Goal: Optimal Comfort and Wellbeing  Outcome: Progressing  Intervention: Provide Person-Centered Care  Recent Flowsheet Documentation  Taken 6/1/2025 0400 by Zulay Smith LPN  Trust Relationship/Rapport:   care explained   choices provided   emotional support provided   empathic listening provided   questions answered   questions encouraged   reassurance provided   thoughts/feelings acknowledged  Taken 6/1/2025 0000 by Zulay Smith LPN  Trust Relationship/Rapport:   care explained   choices provided   emotional support provided   empathic listening provided   questions answered   questions encouraged   reassurance provided   thoughts/feelings acknowledged  Taken 5/31/2025 2000 by Zulay Smith LPN  Trust Relationship/Rapport:   care explained   choices provided   emotional support provided   empathic listening provided   questions answered   questions encouraged   reassurance provided   thoughts/feelings acknowledged  Goal: Readiness for Transition of Care  Outcome: Progressing   Goal Outcome Evaluation:

## 2025-06-01 NOTE — DISCHARGE SUMMARY
Date of Discharge:  6/1/2025    Discharge Diagnosis:   New onset atrial fibrillation  Gastroesophageal reflux disease without esophagitis  Pulmonary embolism  History of DVT (deep vein thrombosis)  Erosive esophagitis  KEVON on CPAP    Presenting Problem/History of Present Illness  Active Hospital Problems    Diagnosis  POA    **New onset atrial fibrillation [I48.91]  Yes    KEVON on CPAP [G47.33]  Yes    History of DVT (deep vein thrombosis) [Z86.718]  Not Applicable    Erosive esophagitis [K22.10]  Yes    Pulmonary embolism [I26.99]  Yes    Gastroesophageal reflux disease without esophagitis [K21.9]  Yes      Resolved Hospital Problems   No resolved problems to display.          Hospital Course    Patient is a 45 y.o. male presented with with history of recent pulmonary embolism, treated with Eliquis, who [facility] with sudden onset of palpitations and heart rate in the 160s.  Symptoms started about an hour after doing an albuterol nebulizer treatment. He was placed on cardizem drip and converted to sinus. He will be placed on low dose beta blocker and follow up with PCP.      Procedures Performed         Consults:   Consults       Date and Time Order Name Status Description    5/31/2025  3:36 PM Inpatient Cardiology Consult Completed     5/22/2025 10:54 PM Inpatient Hematology & Oncology Consult Completed     5/17/2025  8:21 PM IP Consult to Cardiology Completed             Pertinent Test Results:    Lab Results (most recent)       Procedure Component Value Units Date/Time    High Sensitivity Troponin T 1Hr [[id number]] Collected: 05/31/25 0938    Specimen: Blood Updated: 05/31/25 0959     HS Troponin T 8 ng/L      Troponin T Numeric Delta --     Comment: Unable to calculate.       Narrative:      High Sensitive Troponin T Reference Range:  <14.0 ng/L- Negative Female for AMI  <22.0 ng/L- Negative Male for AMI  >=14 - Abnormal Female indicating possible myocardial injury.  >=22 - Abnormal Male  indicating possible myocardial injury.   Clinicians would have to utilize clinical acumen, EKG, Troponin, and serial changes to determine if it is an Acute Myocardial Infarction or myocardial injury due to an underlying chronic condition.         Urinalysis without microscopic (no culture) - Urine, Clean Catch [[id number]]  (Abnormal) Collected: 05/31/25 0935    Specimen: Urine, Clean Catch Updated: 05/31/25 0945     Color, UA Yellow     Appearance, UA Clear     pH, UA 8.0     Specific Gravity, UA 1.010     Glucose, UA Negative     Ketones, UA Trace     Bilirubin, UA Negative     Blood, UA Negative     Protein, UA Negative     Leuk Esterase, UA Negative     Nitrite, UA Negative     Urobilinogen, UA 0.2 E.U./dL    Comprehensive Metabolic Panel [[id number]]  (Abnormal) Collected: 05/31/25 0836    Specimen: Blood Updated: 05/31/25 0910     Glucose 124 mg/dL      BUN 14.5 mg/dL      Creatinine 1.13 mg/dL      Sodium 140 mmol/L      Comment: Ran on EPOC        Potassium 3.8 mmol/L      Chloride 102 mmol/L      CO2 20.2 mmol/L      Calcium 10.3 mg/dL      Total Protein 7.1 g/dL      Albumin 4.7 g/dL      ALT (SGPT) 60 U/L      AST (SGOT) 17 U/L      Alkaline Phosphatase 48 U/L      Total Bilirubin 1.1 mg/dL      Globulin 2.4 gm/dL      A/G Ratio 2.0 g/dL      BUN/Creatinine Ratio 12.8     Anion Gap 17.8 mmol/L      eGFR 82.7 mL/min/1.73     Narrative:      GFR Categories in Chronic Kidney Disease (CKD)              GFR Category          GFR (mL/min/1.73)    Interpretation  G1                    90 or greater        Normal or high (1)  G2                    60-89                Mild decrease (1)  G3a                   45-59                Mild to moderate decrease  G3b                   30-44                Moderate to severe decrease  G4                    15-29                Severe decrease  G5                    14 or less           Kidney failure    (1)In the absence of evidence of kidney disease, neither GFR category  G1 or G2 fulfill the criteria for CKD.    eGFR calculation 2021 CKD-EPI creatinine equation, which does not include race as a factor    High Sensitivity Troponin T [[id number]]  (Normal) Collected: 05/31/25 0836    Specimen: Blood Updated: 05/31/25 0903     HS Troponin T <6 ng/L     Narrative:      High Sensitive Troponin T Reference Range:  <14.0 ng/L- Negative Female for AMI  <22.0 ng/L- Negative Male for AMI  >=14 - Abnormal Female indicating possible myocardial injury.  >=22 - Abnormal Male indicating possible myocardial injury.   Clinicians would have to utilize clinical acumen, EKG, Troponin, and serial changes to determine if it is an Acute Myocardial Infarction or myocardial injury due to an underlying chronic condition.         [place] Draw [[id number]] Collected: 05/31/25 0836    Specimen: Blood Updated: 05/31/25 0851    Narrative:      The following orders were created for panel order [place] Draw.  Procedure                               Abnormality         Status                     ---------                               -----------         ------                     Green Top (Gel)[[id number]]                                  Final result               Lavender Top[[id number]]                                     Final result               Gold Top - SST[[id number]]                                                              Light Blue Top[[id number]]                                   Final result               Green Top (Gel)[[id number]]                                                               Please view results for these tests on the individual orders.    Green Top (Gel) [[id number]] Collected: 05/31/25 0836    Specimen: Blood Updated: 05/31/25 0845     Extra Tube Hold for add-ons.     Comment: Auto resulted.       Lavender Top [[id number]] Collected: 05/31/25 0836    Specimen: Blood Updated: 05/31/25 0845     Extra Tube hold for add-on     Comment: Auto resulted       Light Blue Top [[id number]]  Collected: 05/31/25 0836    Specimen: Blood Updated: 05/31/25 0845     Extra Tube Hold for add-ons.     Comment: Auto resulted       CBC & Differential [[id number]]  (Abnormal) Collected: 05/31/25 0836    Specimen: Blood Updated: 05/31/25 0841    Narrative:      The following orders were created for panel order CBC & Differential.  Procedure                               Abnormality         Status                     ---------                               -----------         ------                     CBC Auto Differential[[id number]]        Abnormal            Final result                 Please view results for these tests on the individual orders.    CBC Auto Differential [[id number]]  (Abnormal) Collected: 05/31/25 0836    Specimen: Blood Updated: 05/31/25 0841     WBC 7.13 10*3/mm3      RBC 4.86 10*6/mm3      Hemoglobin 16.3 g/dL      Hematocrit 46.7 %      MCV 96.1 fL      MCH 33.5 pg      MCHC 34.9 g/dL      RDW 10.8 %      RDW-SD 38.6 fl      MPV 10.3 fL      Platelets 249 10*3/mm3      Neutrophil % 69.2 %      Lymphocyte % 21.0 %      Monocyte % 9.0 %      Eosinophil % 0.4 %      Basophil % 0.3 %      Immature Grans % 0.1 %      Neutrophils, Absolute 4.93 10*3/mm3      Lymphocytes, Absolute 1.50 10*3/mm3      Monocytes, Absolute 0.64 10*3/mm3      Eosinophils, Absolute 0.03 10*3/mm3      Basophils, Absolute 0.02 10*3/mm3      Immature Grans, Absolute 0.01 10*3/mm3              Results for orders placed during the hospital encounter of 05/22/25    Adult Transthoracic Echo Complete W/ Cont if Necessary Per Protocol    Interpretation Summary    Left ventricular ejection fraction appears to be 61 - 65%.    Left ventricular diastolic function was normal.    The right ventricular cavity is borderline dilated.       Condition on Discharge:  Stable    Vital Signs  Temp:  [97.6 °F (36.4 °C)-98.3 °F (36.8 °C)] 98 °F (36.7 °C)  Heart Rate:  [] 67  Resp:  [13-21] 21  BP: (100-132)/(70-87) 129/81      Physical  Exam  Vitals reviewed.   Constitutional:       Appearance: He is not ill-appearing.   HENT:      Head: Normocephalic and atraumatic.      Right Ear: External ear normal.      Left Ear: External ear normal.      Nose: Nose normal.      Mouth/Throat:      Mouth: Mucous membranes are moist.   Eyes:      General:         Right eye: No discharge.         Left eye: No discharge.   Cardiovascular:      Rate and Rhythm: Normal rate and regular rhythm.      Pulses: Normal pulses.      Heart sounds: Normal heart sounds.   Pulmonary:      Effort: Pulmonary effort is normal.      Breath sounds: Normal breath sounds.   Abdominal:      General: Bowel sounds are normal.      Palpations: Abdomen is soft.   Musculoskeletal:         General: Normal range of motion.      Cervical back: Normal range of motion.   Skin:     General: Skin is warm and dry.   Neurological:      Mental Status: He is alert and oriented to person, place, and time.   Psychiatric:         Behavior: Behavior normal.              Discharge Disposition      Discharge Medications     Discharge Medications        New Medications        Instructions Start Date   metoprolol tartrate 25 MG tablet  Commonly known as: LOPRESSOR   12.5 mg, Oral, Every 12 Hours Scheduled             Continue These Medications        Instructions Start Date   ALPRAZolam 0.5 MG tablet  Commonly known as: XANAX   0.5 mg, Oral, 3 Times Daily PRN      azithromycin 500 MG tablet  Commonly known as: ZITHROMAX   500 mg, Oral, Daily      Eliquis 5 MG tablet tablet  Generic drug: apixaban   Take 2 tablets by mouth 2 (Two) Times a Day for 7 days, THEN 1 tablet 2 (Two) Times a Day for 21 days.   Start Date: May 23, 2025     ondansetron ODT 4 MG disintegrating tablet  Commonly known as: ZOFRAN-ODT   4 mg, Translingual, Every 8 Hours PRN      Voquezna 20 MG tablet  Generic drug: Vonoprazan Fumarate   20 mg, Daily               Discharge Diet:   Diet Instructions       Diet: Regular/House Diet; Regular  (IDDSI 7); Thin (IDDSI 0)      Discharge Diet: Regular/House Diet    Texture: Regular (IDDSI 7)    Fluid Consistency: Thin (IDDSI 0)            Activity at Discharge:   Activity Instructions       Activity as Tolerated              Follow-up Appointments  No future appointments.      Test Results Pending at Discharge  Pending Results       None             SHAUNA Martin  06/01/25  12:19 EDT    Time: Discharge 25 min

## 2025-06-01 NOTE — OUTREACH NOTE
Medical Week 1 Survey      Flowsheet Row Responses   [facility] facility patient discharged from? [facility]   Does the patient have one of the following disease processes/diagnoses(primary or secondary)? Other   Week 1 attempt successful? No   Unsuccessful attempts Attempt 1   Revoke Readmitted            ANAY WATERS - Registered Nurse

## 2025-06-01 NOTE — CONSULTS
[facility]   Consult Note    Patient Name: Jass Odell  : 1980  MRN: [id number]  Primary Care Physician:  Luan Gomes MD  Referring Physician: Bharath Russell MD  Date of admission: 2025    Subjective   Subjective     Reason for Consult/ Chief Complaint: New onset atrial fibrillation     HPI:  Jass Odell is a 45 y.o. male with a past medical history of recent PE on Eliquis for anticoagulation, prior DVT, KEVON on CPAP who presents to the ER due to sudden onset of palpitations.  He said symptoms started about an hour after doing an albuterol nebulizer treatment when he noticed his heart rate was in the 160s.  He has no prior history of arrhythmias however does say that he has had episodes of tachycardia intermittently in the past.  He has a history of sleep apnea and is compliant with his CPAP machine.  He also has been compliant with his anticoagulation for PE that was recently diagnosed.  He denies any caffeine or stimulant use.  He is a prior smoker and denies any alcohol use.  On admission his EKG confirmed atrial fibrillation with RVR.  He was started on Cardizem drip.  Overnight he converted to normal sinus rhythm and Cardizem drip was discontinued.  He is feeling much better with no further symptoms.  His blood pressure is stable at 129/81, heart rate 67 bpm, in normal sinus rhythm  His labs on admission relatively stable and within normal limits.  Recent TSH was within normal limits.    He had a recent echocardiogram performed on 2025 which showed LVEF of 60 to 65%, normal diastolic function and no significant valvular disease.  He currently denies having any current symptoms of chest pain, shortness of breath, dyspnea on exertion, palpitations, lightheadedness, dizziness, syncope, headaches, chills, fevers, or  leg swelling.      The ASCVD Risk score (Jhoana DK, et al., 2019) failed to calculate for the following reasons:    Cannot find a previous HDL lab    Cannot find a previous  total cholesterol lab     Review of Systems   Negative except for above    Personal History     Past Medical History:   Diagnosis Date    Anxiety     GERD (gastroesophageal reflux disease)     Sleep apnea        Past Surgical History:   Procedure Laterality Date    ENDOSCOPY N/A 8/12/2024    Procedure: ESOPHAGOGASTRODUODENOSCOPY;  Surgeon: Oseas Pfeiffer MD;  Location: [facility] ENDOSCOPY;  Service: Gastroenterology;  Laterality: N/A;  POST-EROSIVE ESOPHAGITIS       Family History: family history includes Diabetes in his father; Thyroid disease in his father. Otherwise pertinent FHx was reviewed and not pertinent to current issue.    Social History:  reports that he has quit smoking. His smoking use included cigars. He has quit using smokeless tobacco.  His smokeless tobacco use included chew. He reports that he does not currently use alcohol. He reports that he does not currently use drugs after having used the following drugs: Marijuana.    Home Medications:  ALPRAZolam, Vonoprazan Fumarate, apixaban, azithromycin, and ondansetron ODT    Allergies:  Allergies   Allergen Reactions    Prednisone GI Bleeding     PO prednisone causes GI bleeding per patient       Objective    Objective     Vitals:   Temp:  [97.6 °F (36.4 °C)-98.3 °F (36.8 °C)] 98 °F (36.7 °C)  Heart Rate:  [] 67  Resp:  [13-21] 21  BP: (100-139)/(64-87) 129/81    Physical Exam:   Constitutional: Awake, alert and oriented x 3    Eyes: PERRLA, sclerae anicteric, no conjunctival injection   HENT: NCAT, mucous membranes moist   Neck: Supple, no thyromegaly, no lymphadenopathy, trachea midline   Respiratory: Clear to auscultation bilaterally, nonlabored respirations    Cardiovascular: RRR, no murmurs, rubs, or gallops, palpable pedal pulses bilaterally   Gastrointestinal: Positive bowel sounds, soft, nontender, nondistended   Musculoskeletal: No bilateral ankle edema, no clubbing or cyanosis to extremities   Psychiatric: Appropriate affect,  cooperative   Neurologic: Oriented x 3, strength symmetric in all extremities, Cranial Nerves grossly intact to confrontation, speech clear   Skin: No rashes     Result Review    Result Review:  I have personally reviewed the results from the time of this admission to 6/1/2025 09:00 EDT and agree with these findings:  [x]  Laboratory list / accordion  [x]  Microbiology  [x]  Radiology  [x]  EKG/Telemetry   [x]  Cardiology/Vascular   []  Pathology  [x]  Old records  []  Other:  Most notable findings include:     ECHO 5/23/25    Interpretation Summary         Left ventricular ejection fraction appears to be 61 - 65%.    Left ventricular diastolic function was normal.    The right ventricular cavity is borderline dilated.       Assessment & Plan   Assessment / Plan     Brief Patient Summary:  Jass Odell is a 45 y.o. male with a PMH of recent PE on Eliquis for anticoagulation, prior DVT, KEVON on CPAP who presents to the ER due to sudden onset of palpitation.    Active Hospital Problems:  Active Hospital Problems    Diagnosis     **New onset atrial fibrillation     KEVON on CPAP     History of DVT (deep vein thrombosis)     Erosive esophagitis     Pulmonary embolism     Gastroesophageal reflux disease without esophagitis      New onset atrial fibrillation  History of DVT  History of recent PE on Eliquis  KEVON on CPAP  GERD    Patient found to be in A-fib with RVR, new onset.  He now converted to normal sinus rhythm.  Cardizem drip has been discontinued  His [id number] is low however would continue anticoagulation in setting of recent PE.  Will start low-dose metoprolol   Recent echo reviewed from 5/23/2025 which showed normal LVEF, normal diastolic function and no significant valvular disease  Patient stressed to stay compliant with CPAP machine  Closely monitor electrolytes and replete as needed.  Keep K >4, Mg>2. Recent TSH wnl   Patient can establish care and follow-up outpatient.    EMR Dragon/Transcription  disclaimer:  Much of this encounter note is an electronic transcription/translation of spoken language to printed text. The electronic translation of spoken language may permit erroneous, or at times, nonsensical words or phrases to be inadvertently transcribed; Although I have reviewed the note for such errors, some may still exist.       Moe Haji MD

## 2025-06-02 ENCOUNTER — READMISSION MANAGEMENT (OUTPATIENT)
Dept: CALL CENTER | Facility: HOSPITAL | Age: 45
End: 2025-06-02
Payer: COMMERCIAL

## 2025-06-02 NOTE — OUTREACH NOTE
Prep Survey      Flowsheet Row Responses   [facility] facility patient discharged from? [facility]   Is LACE score < 7 ? No   Eligibility Readm Mgmt   Discharge diagnosis New onset atrial fibrillation   Does the patient have one of the following disease processes/diagnoses(primary or secondary)? Other   Does the patient have Home health ordered? No   Is there a DME ordered? No   Medication alerts for this patient see avs   Prep survey completed? Yes            Joy TITUS - Registered Nurse

## 2025-06-03 ENCOUNTER — TELEPHONE (OUTPATIENT)
Dept: ONCOLOGY | Facility: CLINIC | Age: 45
End: 2025-06-03
Payer: COMMERCIAL

## 2025-06-03 NOTE — CASE MANAGEMENT/SOCIAL WORK
Case Management Discharge Note      Final Note: Routine home         Selected Continued Care - Discharged on 6/1/2025 Admission date: 5/31/2025 - Discharge disposition: Home or Self Care     Transportation Services  Private: Car    Final Discharge Disposition Code: 01 - home or self-care

## 2025-06-03 NOTE — TELEPHONE ENCOUNTER
Called Pt to [facility] a 6 wk f/up, (Hosp?) as directed by Cookie. No answer and v/m said JR so I did not leave a message for a call back. Unsure as to if this was our Pt's correct #

## 2025-06-06 ENCOUNTER — READMISSION MANAGEMENT (OUTPATIENT)
Dept: CALL CENTER | Facility: HOSPITAL | Age: 45
End: 2025-06-06
Payer: COMMERCIAL

## 2025-06-06 NOTE — OUTREACH NOTE
Medical Week 1 Survey      Flowsheet Row Responses   [facility] patient discharged from? [facility]   Does the patient have one of the following disease processes/diagnoses(primary or secondary)? Other   Week 1 attempt successful? Yes   Call start time 0907   Call end time 0910   Discharge diagnosis New onset atrial fibrillation   Meds reviewed with patient/caregiver? Yes   Is the patient having any side effects they believe may be caused by any medication additions or changes? No   Does the patient have all medications ordered at discharge? Yes   Is the patient taking all medications as directed (includes completed medication regime)? Yes   Does the patient have a primary care provider?  Yes   Comments regarding PCP has seen PCP for a follow up appt.   Has the patient kept scheduled appointments due by today? Yes   Has home health visited the patient within 72 hours of discharge? N/A   Psychosocial issues? No   Did the patient receive a copy of their discharge instructions? Yes   Nursing interventions Reviewed instructions with patient   What is the patient's perception of their health status since discharge? Improving   Is the patient/caregiver able to teach back signs and symptoms related to disease process for when to call PCP? Yes   Is the patient/caregiver able to teach back signs and symptoms related to disease process for when to call 911? Yes   Is the patient/caregiver able to teach back the hierarchy of who to call/visit for symptoms/problems? PCP, Specialist, Home health nurse, Urgent Care, ED, 911 Yes   Week 1 call completed? Yes   Graduated Yes   Did the patient feel the follow up calls were helpful during their recovery period? Yes   Was the number of calls appropriate? Yes   Would this patient benefit from a Referral to Amb Social Work? No   Is the patient interested in additional calls from an ambulatory ? No   Graduated/Revoked comments Doing well, denies any questions or concerns, has  been to see his PCP, no further calls needed.   Call end time 0910            Tracey CHAPA - Registered Nurse

## 2025-06-17 ENCOUNTER — OFFICE (AMBULATORY)
Dept: URBAN - METROPOLITAN AREA CLINIC 64 | Facility: CLINIC | Age: 45
End: 2025-06-17
Payer: COMMERCIAL

## 2025-06-17 VITALS
HEIGHT: 72 IN | HEART RATE: 72 BPM | WEIGHT: 228 LBS | DIASTOLIC BLOOD PRESSURE: 77 MMHG | SYSTOLIC BLOOD PRESSURE: 120 MMHG

## 2025-06-17 DIAGNOSIS — Z79.01 LONG TERM (CURRENT) USE OF ANTICOAGULANTS: ICD-10-CM

## 2025-06-17 DIAGNOSIS — R11.0 NAUSEA: ICD-10-CM

## 2025-06-17 DIAGNOSIS — R63.4 ABNORMAL WEIGHT LOSS: ICD-10-CM

## 2025-06-17 DIAGNOSIS — K21.00 GASTRO-ESOPHAGEAL REFLUX DISEASE WITH ESOPHAGITIS, WITHOUT B: ICD-10-CM

## 2025-06-17 PROCEDURE — 99214 OFFICE O/P EST MOD 30 MIN: CPT | Performed by: NURSE PRACTITIONER

## 2025-06-17 RX ORDER — FAMOTIDINE 10 MG
10 TABLET ORAL 2 TIMES DAILY
COMMUNITY

## 2025-06-17 RX ORDER — FAMOTIDINE 40 MG/1
80 TABLET, FILM COATED ORAL
Qty: 60 | Refills: 11 | Status: ACTIVE
Start: 2025-06-17

## 2025-06-17 RX ORDER — LIDOCAINE HYDROCHLORIDE 20 MG/ML
90 SOLUTION ORAL; TOPICAL
Qty: 450 | Refills: 1 | Status: ACTIVE
Start: 2025-06-17

## 2025-06-17 RX ORDER — SERTRALINE HYDROCHLORIDE 25 MG/1
25 TABLET, FILM COATED ORAL DAILY
COMMUNITY

## 2025-06-17 RX ORDER — QUETIAPINE FUMARATE 50 MG/1
TABLET, EXTENDED RELEASE ORAL NIGHTLY
COMMUNITY

## 2025-06-23 ENCOUNTER — ON CAMPUS - OUTPATIENT (AMBULATORY)
Dept: URBAN - METROPOLITAN AREA HOSPITAL 85 | Facility: HOSPITAL | Age: 45
End: 2025-06-23
Payer: COMMERCIAL

## 2025-06-23 ENCOUNTER — HOSPITAL ENCOUNTER (OUTPATIENT)
Facility: HOSPITAL | Age: 45
Setting detail: HOSPITAL OUTPATIENT SURGERY
Discharge: HOME | End: 2025-06-23
Attending: INTERNAL MEDICINE | Admitting: INTERNAL MEDICINE
Payer: COMMERCIAL

## 2025-06-23 ENCOUNTER — ANESTHESIA EVENT (OUTPATIENT)
Dept: GASTROENTEROLOGY | Facility: HOSPITAL | Age: 45
End: 2025-06-23
Payer: COMMERCIAL

## 2025-06-23 ENCOUNTER — ANESTHESIA (OUTPATIENT)
Dept: GASTROENTEROLOGY | Facility: HOSPITAL | Age: 45
End: 2025-06-23
Payer: COMMERCIAL

## 2025-06-23 VITALS
DIASTOLIC BLOOD PRESSURE: 68 MMHG | HEART RATE: 57 BPM | WEIGHT: 226.8 LBS | RESPIRATION RATE: 17 BRPM | TEMPERATURE: 98.1 F | BODY MASS INDEX: 32.47 KG/M2 | OXYGEN SATURATION: 98 % | HEIGHT: 70 IN | SYSTOLIC BLOOD PRESSURE: 112 MMHG

## 2025-06-23 DIAGNOSIS — K21.00 GASTROESOPHAGEAL REFLUX DISEASE WITH ESOPHAGITIS WITHOUT HEMORRHAGE: ICD-10-CM

## 2025-06-23 DIAGNOSIS — R11.0 NAUSEA: ICD-10-CM

## 2025-06-23 DIAGNOSIS — K44.9 DIAPHRAGMATIC HERNIA WITHOUT OBSTRUCTION OR GANGRENE: ICD-10-CM

## 2025-06-23 DIAGNOSIS — Z79.01 LONG TERM (CURRENT) USE OF ANTICOAGULANTS: ICD-10-CM

## 2025-06-23 DIAGNOSIS — R63.4 ABNORMAL WEIGHT LOSS: ICD-10-CM

## 2025-06-23 DIAGNOSIS — R07.9 CHEST PAIN: ICD-10-CM

## 2025-06-23 DIAGNOSIS — R13.10 DYSPHAGIA, UNSPECIFIED: ICD-10-CM

## 2025-06-23 DIAGNOSIS — K21.00 GASTRO-ESOPHAGEAL REFLUX DISEASE WITH ESOPHAGITIS, WITHOUT B: ICD-10-CM

## 2025-06-23 DIAGNOSIS — R07.9 CHEST PAIN, UNSPECIFIED: ICD-10-CM

## 2025-06-23 PROCEDURE — 25810000003 SODIUM CHLORIDE 0.9 % SOLUTION: Performed by: NURSE ANESTHETIST, CERTIFIED REGISTERED

## 2025-06-23 PROCEDURE — 43450 DILATE ESOPHAGUS 1/MULT PASS: CPT | Performed by: INTERNAL MEDICINE

## 2025-06-23 PROCEDURE — 88305 TISSUE EXAM BY PATHOLOGIST: CPT | Performed by: INTERNAL MEDICINE

## 2025-06-23 PROCEDURE — 43239 EGD BIOPSY SINGLE/MULTIPLE: CPT | Performed by: INTERNAL MEDICINE

## 2025-06-23 PROCEDURE — 25010000002 PROPOFOL 10 MG/ML EMULSION: Performed by: NURSE ANESTHETIST, CERTIFIED REGISTERED

## 2025-06-23 RX ORDER — DIPHENHYDRAMINE HYDROCHLORIDE 50 MG/ML
12.5 INJECTION, SOLUTION INTRAMUSCULAR; INTRAVENOUS
Status: DISCONTINUED | OUTPATIENT
Start: 2025-06-23 | End: 2025-06-23 | Stop reason: HOSPADM

## 2025-06-23 RX ORDER — LABETALOL HYDROCHLORIDE 5 MG/ML
5 INJECTION, SOLUTION INTRAVENOUS
Status: DISCONTINUED | OUTPATIENT
Start: 2025-06-23 | End: 2025-06-23 | Stop reason: HOSPADM

## 2025-06-23 RX ORDER — ONDANSETRON 2 MG/ML
4 INJECTION INTRAMUSCULAR; INTRAVENOUS ONCE AS NEEDED
Status: DISCONTINUED | OUTPATIENT
Start: 2025-06-23 | End: 2025-06-23 | Stop reason: HOSPADM

## 2025-06-23 RX ORDER — MEPERIDINE HYDROCHLORIDE 25 MG/ML
12.5 INJECTION INTRAMUSCULAR; INTRAVENOUS; SUBCUTANEOUS
Status: DISCONTINUED | OUTPATIENT
Start: 2025-06-23 | End: 2025-06-23 | Stop reason: HOSPADM

## 2025-06-23 RX ORDER — EPHEDRINE SULFATE 5 MG/ML
5 INJECTION INTRAVENOUS ONCE AS NEEDED
Status: DISCONTINUED | OUTPATIENT
Start: 2025-06-23 | End: 2025-06-23 | Stop reason: HOSPADM

## 2025-06-23 RX ORDER — IPRATROPIUM BROMIDE AND ALBUTEROL SULFATE 2.5; .5 MG/3ML; MG/3ML
3 SOLUTION RESPIRATORY (INHALATION) ONCE AS NEEDED
Status: DISCONTINUED | OUTPATIENT
Start: 2025-06-23 | End: 2025-06-23 | Stop reason: HOSPADM

## 2025-06-23 RX ORDER — SODIUM CHLORIDE 9 MG/ML
INJECTION, SOLUTION INTRAVENOUS CONTINUOUS PRN
Status: DISCONTINUED | OUTPATIENT
Start: 2025-06-23 | End: 2025-06-23 | Stop reason: SURG

## 2025-06-23 RX ORDER — HYDRALAZINE HYDROCHLORIDE 20 MG/ML
5 INJECTION INTRAMUSCULAR; INTRAVENOUS
Status: DISCONTINUED | OUTPATIENT
Start: 2025-06-23 | End: 2025-06-23 | Stop reason: HOSPADM

## 2025-06-23 RX ORDER — PROPOFOL 10 MG/ML
VIAL (ML) INTRAVENOUS AS NEEDED
Status: DISCONTINUED | OUTPATIENT
Start: 2025-06-23 | End: 2025-06-23 | Stop reason: SURG

## 2025-06-23 RX ADMIN — PROPOFOL 50 MG: 10 INJECTION, EMULSION INTRAVENOUS at 10:07

## 2025-06-23 RX ADMIN — PROPOFOL 50 MG: 10 INJECTION, EMULSION INTRAVENOUS at 10:05

## 2025-06-23 RX ADMIN — SODIUM CHLORIDE: 9 INJECTION, SOLUTION INTRAVENOUS at 09:56

## 2025-06-23 RX ADMIN — PROPOFOL 100 MG: 10 INJECTION, EMULSION INTRAVENOUS at 10:01

## 2025-06-23 RX ADMIN — PROPOFOL 50 MG: 10 INJECTION, EMULSION INTRAVENOUS at 10:04

## 2025-06-23 NOTE — DISCHARGE INSTRUCTIONS
A responsible adult should stay with you and you should rest quietly for the rest of the day.    Do not drink alcohol, drive, operate any heavy machinery or power tools or make any legal/important decisions for the next 24 hours.     If you begin to experience severe pain, increased shortness of breath, racing heartbeat or a fever above 101 F, seek immediate medical attention.     Follow up with MD as instructed. Call office for results in 3 to 5 days if needed. [phone]    Findings:  Possible short segment Randle's from 40 to 41 cm from the incisors biopsied  Esophagus was empirically dilated 54 French Cheema without resistance or trauma  3 cm hiatal hernia  Otherwise normal EGD     Impression:  Atypical chest pain and dysphagia status post empiric dilation  Possible Randle's biopsied     Recommendations:  Monitor for symptom improvement following dilation  Follow-up biopsy results  Repeat EGD in 5 years if biopsies show intestinal metaplasia  Continue Voquezna  Sucralfate 1 g p.o. 3 times daily as needed was sent to his pharmacy  Consider esophageal manometry if symptoms persist

## 2025-06-23 NOTE — ANESTHESIA POSTPROCEDURE EVALUATION
Patient: Jass Odell    Procedure Summary       Date: 06/23/25 Room / Location: [facility] ENDOSCOPY 1 / [facility] ENDOSCOPY    Anesthesia Start: 0956 Anesthesia Stop: 1014    Procedure: ESOPHAGOGASTRODUODENOSCOPY with nonwireguided esophageal bougie dilation (54 Fr) and biopsy x 1 area Diagnosis:       Chest pain      Abnormal weight loss      Gastroesophageal reflux disease with esophagitis without hemorrhage      Nausea      Long term (current) use of anticoagulants      (Chest pain [R07.9])      (Abnormal weight loss [R63.4])      (Gastroesophageal reflux disease with esophagitis without hemorrhage [K21.00])      (Nausea [R11.0])      (Long term (current) use of anticoagulants [Z79.01])    Surgeons: Minor Alexander MD Provider: Dahiana Guerin MD    Anesthesia Type: general ASA Status: 2            Anesthesia Type: general    Vitals  Vitals Value Taken Time   /68 06/23/25 10:25   Temp     Pulse 60 06/23/25 10:29   Resp 17 06/23/25 10:25   SpO2 98 % 06/23/25 10:29   Vitals shown include unfiled device data.        Post Anesthesia Care and Evaluation    Patient location during evaluation: PACU  Patient participation: complete - patient participated  Level of consciousness: awake and alert  Pain management: satisfactory to patient    Airway patency: patent  Anesthetic complications: No anesthetic complications  PONV Status: none  Cardiovascular status: acceptable  Respiratory status: acceptable  Hydration status: acceptable

## 2025-06-23 NOTE — OP NOTE
ESOPHAGOGASTRODUODENOSCOPY Procedure Report    Patient Name:  Jass Odell  YOB: 1980    Date of Surgery:  6/23/2025     Pre-Op Diagnosis:  Chest pain [R07.9]  Abnormal weight loss [R63.4]  Gastroesophageal reflux disease with esophagitis without hemorrhage [K21.00]  Nausea [R11.0]  Long term (current) use of anticoagulants [Z79.01]       Post-Op Diagnosis Codes:     * Chest pain [R07.9]     * Abnormal weight loss [R63.4]     * Gastroesophageal reflux disease with esophagitis without hemorrhage [K21.00]     * Nausea [R11.0]     * Long term (current) use of anticoagulants [Z79.01]  Possible short segment Randle's from 40 to 41 cm from the incisors biopsied  Esophagus was empirically dilated 54 Gabonese Cheema without resistance or trauma  3 cm hiatal hernia  Otherwise normal EGD    Procedure/CPT® Codes:  [place] ESOPHAGOGASTRODUODENOSCOPY TRANSORAL DIAGNOSTIC [98916]    Procedure(s):  ESOPHAGOGASTRODUODENOSCOPY with nonwireguided esophageal bougie dilation (54 Fr) and biopsy x 1 area    Staff:  Surgeon(s):  Minor Alexander MD      Anesthesia: Monitored Anesthesia Care    Description of Procedure:  A description of the procedure as well as risks, benefits and alternative methods were explained to the patient who voiced understanding and signed the corresponding consent form. A physical exam was performed and vital signs were monitored throughout the procedure.    An upper GI endoscope was placed into the mouth and proceeded through the esophagus, stomach and second portion of the duodenum without difficulty. The scope was then retroflexed and the fundus was visualized. The procedure was not difficult and there were no immediate complications.    Specimen:        See Below    Estimated blood loss: none    Complications:  None    Findings:  Possible short segment Randle's from 40 to 41 cm from the incisors biopsied  Esophagus was empirically dilated 54 Gabonese Cheema without resistance or trauma  3 cm  hiatal hernia  Otherwise normal EGD    Impression:  Atypical chest pain and dysphagia status post empiric dilation  Possible Randle's biopsied    Recommendations:  Monitor for symptom improvement following dilation  Follow-up biopsy results  Repeat EGD in 5 years if biopsies show intestinal metaplasia  Continue Voquezna  Sucralfate 1 g p.o. 3 times daily as needed was sent to his pharmacy  Consider esophageal manometry if symptoms persist      Minor Alexander MD     Date: 6/23/2025    Time: 10:11 EDT

## 2025-06-23 NOTE — H&P
GI Procedure H&P:    Referring Provider:    Brad Garcia PA-C Harrell, Steven, MD    Chief complaint: <principal problem not specified>    Subjective .  Epigastric pain, weight loss, noncardiac chest pain and history of esophagitis    History of present illness:      Jass Odell is a 45 y.o. male who presents today for Procedure(s):  ESOPHAGOGASTRODUODENOSCOPY for the indications listed below.     The updated Patient Profile was reviewed prior to the procedure, in conjunction with the Physical Exam, including medical conditions, surgical procedures, medications, allergies, family history and social history.     Pre-operatively, I reviewed the indication(s) for the procedure, the risks of the procedure [including but not limited to: unexpected bleeding possibly requiring hospitalization and/or unplanned repeat procedures, perforation possibly requiring surgical treatment, missed lesions and complications of sedation/MAC (also explained by anesthesia staff)].     I have evaluated the patient for risks associated with the planned anesthesia and the procedure to be performed and find the patient an acceptable candidate for IV sedation.    Multiple opportunities were provided for any questions or concerns, and all questions were answered satisfactorily before any anesthesia was administered. We will proceed with the planned procedure.    Past Medical History:  Past Medical History:   Diagnosis Date    Anxiety     GERD (gastroesophageal reflux disease)     Sleep apnea        Past Surgical History:  Past Surgical History:   Procedure Laterality Date    ENDOSCOPY N/A 8/12/2024    Procedure: ESOPHAGOGASTRODUODENOSCOPY;  Surgeon: Oseas Pfeiffer MD;  Location: [facility] ENDOSCOPY;  Service: Gastroenterology;  Laterality: N/A;  POST-EROSIVE ESOPHAGITIS       Social History:  Social History     Tobacco Use    Smoking status: Former     Types: Cigars    Smokeless tobacco: Former     Types: Chew   Vaping Use     Vaping status: Never Used   Substance Use Topics    Alcohol use: Not Currently    Drug use: Not Currently     Types: Marijuana     Comment: occ       Family History:  Family History   Problem Relation Age of Onset    Thyroid disease Father     Diabetes Father        Medications:  Medications Prior to Admission   Medication Sig Dispense Refill Last Dose/Taking    ALPRAZolam (XANAX) 0.5 MG tablet Take 1 tablet by mouth 3 (Three) Times a Day As Needed for Anxiety. 10 tablet 0 6/22/2025    famotidine (PEPCID) 10 MG tablet Take 1 tablet by mouth 2 (Two) Times a Day.   6/21/2025    metoprolol tartrate (LOPRESSOR) 25 MG tablet Take 0.5 tablets by mouth Every 12 (Twelve) Hours. 30 tablet 2 6/22/2025    ondansetron ODT (ZOFRAN-ODT) 4 MG disintegrating tablet Place 1 tablet on the tongue Every 8 (Eight) Hours As Needed for Nausea or Vomiting. 15 tablet 0 Taking As Needed    QUEtiapine fumarate ER (SEROquel XR) 50 MG tablet sustained-release 24 hour tablet Take  by mouth Every Night.   6/22/2025    sertraline (ZOLOFT) 25 MG tablet Take 1 tablet by mouth Daily.   Taking    Vonoprazan Fumarate (Voquezna) 20 MG tablet Take 1 tablet by mouth Daily.   6/22/2025    azithromycin (ZITHROMAX) 500 MG tablet Take 1 tablet by mouth Daily. 3 tablet 0        Scheduled Meds:   Continuous Infusions:   PRN Meds:.  atropine    diphenhydrAMINE    ePHEDrine Sulfate (Pressors)    hydrALAZINE    ipratropium-albuterol    labetalol    lidocaine (cardiac)    meperidine    ondansetron    ALLERGIES:  Prednisone and Sucralfate    ROS:  The following systems were reviewed and negative;   Constitution:  No fevers, chills, no unintentional weight loss  Skin: no rash, no jaundice  Eyes:  No blurry vision, no eye pain  HENT:  No change in hearing or smell  Resp:  No dyspnea or cough  CV:  No chest pain or palpitations  :  No dysuria, hematuria  Musculoskeletal:  No leg cramps or arthralgias  Neuro:  No tremor, no numbness  Psych:  No depression or  "confsuion    Objective     Vital Signs:   Vitals:    06/17/25 1607 06/23/25 0905   BP:  116/66   BP Location:  Right arm   Patient Position:  Sitting   Pulse:  59   Resp:  19   Temp:  98.1 °F (36.7 °C)   TempSrc:  Oral   SpO2:  95%   Weight: 102 kg (225 lb) 103 kg (226 lb 12.8 oz)   Height: 177.8 cm (70\") 177.8 cm (70\")       Physical Exam:       General Appearance:    Awake and alert, in no acute distress   Head:    Normocephalic, without obvious abnormality, atraumatic   Throat:   No oral lesions, no thrush, oral mucosa moist   Lungs:     respirations regular, even and unlabored   Skin:   No rash, no jaundice       Results Review:  Lab Results (last 24 hours)       ** No results found for the last 24 hours. **            Imaging Results (Last 24 Hours)       ** No results found for the last 24 hours. **             I reviewed the patient's labs and imaging.    ASSESSMENT AND PLAN:  Epigastric pain, weight loss, noncardiac chest pain and history of esophagitis    Active Problems:    * No active hospital problems. *       Procedure(s):  ESOPHAGOGASTRODUODENOSCOPY      I discussed the patients findings and my recommendations with the patient.    Minor Alexander MD  06/23/25  09:26 EDT    "

## 2025-06-23 NOTE — ANESTHESIA PREPROCEDURE EVALUATION
Anesthesia Evaluation     Patient summary reviewed and Nursing notes reviewed   no history of anesthetic complications:   NPO Solid Status: > 8 hours  NPO Liquid Status: > 4 hours           Airway   Mallampati: II  TM distance: >3 FB  Neck ROM: full  No difficulty expected  Dental - normal exam     Pulmonary - normal exam   (+) pulmonary embolism,sleep apnea  Cardiovascular - normal exam    (+) dysrhythmias Atrial Fib      Neuro/Psych  (+) psychiatric history Anxiety  GI/Hepatic/Renal/Endo    (+) GERD, PUD    Musculoskeletal (-) negative ROS    Abdominal  - normal exam   Substance History - negative use     OB/GYN negative ob/gyn ROS         Other                      Anesthesia Plan    ASA 2     general   total IV anesthesia  intravenous induction     Anesthetic plan, risks, benefits, and alternatives have been provided, discussed and informed consent has been obtained with: patient.    Plan discussed with CRNA.      CODE STATUS:

## 2025-06-24 LAB
LAB AP CASE REPORT: NORMAL
LAB AP DIAGNOSIS COMMENT: NORMAL
PATH REPORT.FINAL DX SPEC: NORMAL
PATH REPORT.GROSS SPEC: NORMAL

## 2025-07-23 ENCOUNTER — OFFICE VISIT (OUTPATIENT)
Dept: CARDIOLOGY | Facility: CLINIC | Age: 45
End: 2025-07-23
Payer: COMMERCIAL

## 2025-07-23 VITALS
OXYGEN SATURATION: 99 % | WEIGHT: 243 LBS | SYSTOLIC BLOOD PRESSURE: 113 MMHG | HEIGHT: 70 IN | BODY MASS INDEX: 34.79 KG/M2 | DIASTOLIC BLOOD PRESSURE: 73 MMHG | HEART RATE: 69 BPM

## 2025-07-23 DIAGNOSIS — G47.33 OBSTRUCTIVE SLEEP APNEA: ICD-10-CM

## 2025-07-23 DIAGNOSIS — I26.99 OTHER PULMONARY EMBOLISM WITHOUT ACUTE COR PULMONALE, UNSPECIFIED CHRONICITY: ICD-10-CM

## 2025-07-23 DIAGNOSIS — I10 PRIMARY HYPERTENSION: ICD-10-CM

## 2025-07-23 DIAGNOSIS — Z86.718 HISTORY OF DVT (DEEP VEIN THROMBOSIS): ICD-10-CM

## 2025-07-23 DIAGNOSIS — I48.0 PAROXYSMAL ATRIAL FIBRILLATION: Primary | ICD-10-CM

## 2025-07-23 RX ORDER — APIXABAN 5 MG/1
5 TABLET, FILM COATED ORAL EVERY 12 HOURS SCHEDULED
COMMUNITY
Start: 2025-06-06

## 2025-07-23 NOTE — PROGRESS NOTES
"    Subjective:     Encounter Date:07/23/2025      Patient ID: Jass Odell is a 45 y.o. male.    Chief Complaint:  History of Present Illness 45-year-old white male with history of new onset atrial fibrillation hypertension sleep apnea and history of DVT and PE in the past presents to the office for a new consultation.  Patient is currently stable without any symptoms of chest pain or shortness of breath at rest or exertion.  No compressively PND orthopnea.  No palpitation dizziness syncope or swelling of the feet.  He is taking medicine regular but he is using his CPAP patient.  /73 (BP Location: Left arm, Patient Position: Sitting, Cuff Size: Adult)   Pulse 69   Ht 177.8 cm (70\")   Wt 110 kg (243 lb)   SpO2 99%   BMI 34.87 kg/m²     The following portions of the patient's history were reviewed and updated as appropriate: allergies, current medications, past family history, past medical history, past social history, past surgical history, and problem list.  Past Medical History:   Diagnosis Date    Anxiety     Atrial fibrillation     Deep vein thrombosis     GERD (gastroesophageal reflux disease)     Pulmonary embolism     Sleep apnea      Past Surgical History:   Procedure Laterality Date    CARDIAC CATHETERIZATION      ENDOSCOPY N/A 08/12/2024    Procedure: ESOPHAGOGASTRODUODENOSCOPY;  Surgeon: Oseas Pfeiffer MD;  Location: [facility] ENDOSCOPY;  Service: Gastroenterology;  Laterality: N/A;  POST-EROSIVE ESOPHAGITIS    ENDOSCOPY N/A 06/23/2025    Procedure: ESOPHAGOGASTRODUODENOSCOPY with nonwireguided esophageal bougie dilation (54 Fr) and biopsy x 1 area;  Surgeon: Minor Alexander MD;  Location: [facility] ENDOSCOPY;  Service: Gastroenterology;  Laterality: N/A;  postop: small hiatal hernia, dysphagia, r/o boo's     Social History     Socioeconomic History    Marital status:    Tobacco Use    Smoking status: Former     Current packs/day: 0.00     Average packs/day: 1 pack/day for 12.3 " years (12.3 ttl pk-yrs)     Types: Cigarettes, Cigars     Start date: 1998     Quit date: 2010     Years since quittin.5     Passive exposure: Past    Smokeless tobacco: Former     Types: Chew   Vaping Use    Vaping status: Never Used   Substance and Sexual Activity    Alcohol use: Not Currently    Drug use: Not Currently     Types: Marijuana     Comment: occ    Sexual activity: Yes     Partners: Female     Birth control/protection: I.U.D.     Family History   Problem Relation Age of Onset    No Known Problems Mother     Thyroid disease Father     Diabetes Father     No Known Problems Sister     No Known Problems Brother     No Known Problems Maternal Aunt     No Known Problems Maternal Uncle     No Known Problems Paternal Aunt     No Known Problems Paternal Uncle     No Known Problems Maternal Grandmother     No Known Problems Maternal Grandfather     No Known Problems Paternal Grandmother     No Known Problems Paternal Grandfather     No Known Problems Other     Anemia Neg Hx     Arrhythmia Neg Hx     Asthma Neg Hx     Clotting disorder Neg Hx     Fainting Neg Hx     Heart attack Neg Hx     Heart disease Neg Hx     Heart failure Neg Hx     Hyperlipidemia Neg Hx     Hypertension Neg Hx        Current Outpatient Medications:     ALPRAZolam (XANAX) 0.5 MG tablet, Take 1 tablet by mouth 3 (Three) Times a Day As Needed for Anxiety., Disp: 10 tablet, Rfl: 0    Eliquis 5 MG tablet tablet, Take 1 tablet by mouth Every 12 (Twelve) Hours., Disp: , Rfl:     famotidine (PEPCID) 10 MG tablet, Take 1 tablet by mouth 2 (Two) Times a Day., Disp: , Rfl:     metoprolol tartrate (LOPRESSOR) 25 MG tablet, Take 0.5 tablets by mouth Every 12 (Twelve) Hours., Disp: 30 tablet, Rfl: 2    QUEtiapine fumarate ER (SEROquel XR) 50 MG tablet sustained-release 24 hour tablet, Take  by mouth Every Night., Disp: , Rfl:     Vonoprazan Fumarate (Voquezna) 20 MG tablet, Take 1 tablet by mouth Daily., Disp: , Rfl:   Allergies    Allergen Reactions    Prednisone GI Bleeding     PO prednisone causes GI bleeding per patient    Sucralfate Nausea And Vomiting       Review of Systems   Constitutional: Negative for malaise/fatigue.   Cardiovascular:  Negative for chest pain, dyspnea on exertion, leg swelling and palpitations.   Respiratory:  Negative for cough and shortness of breath.    Gastrointestinal:  Negative for abdominal pain, nausea and vomiting.   Neurological:  Negative for dizziness, headaches, light-headedness, numbness and weakness.   All other systems reviewed and are negative.             Objective:     Constitutional:       Appearance: Well-developed.   Eyes:      General: No scleral icterus.     Conjunctiva/sclera: Conjunctivae normal.      Pupils: Pupils are equal, round, and reactive to light.   HENT:      Head: Normocephalic and atraumatic.   Neck:      Vascular: No carotid bruit or JVD.   Pulmonary:      Effort: Pulmonary effort is normal.      Breath sounds: Normal breath sounds. No wheezing. No rales.   Cardiovascular:      Normal rate. Regular rhythm.   Pulses:     Intact distal pulses.   Abdominal:      General: Bowel sounds are normal.      Palpations: Abdomen is soft.   Musculoskeletal: Normal range of motion.      Cervical back: Normal range of motion and neck supple. Skin:     General: Skin is warm and dry.      Findings: No rash.   Neurological:      Mental Status: Alert.      Comments: No focal deficits         Procedures    Lab Review:       Assessment:          Diagnosis Plan   1. Paroxysmal atrial fibrillation        2. Primary hypertension        3. Obstructive sleep apnea        4. History of DVT (deep vein thrombosis)        5. Other pulmonary embolism without acute cor pulmonale, unspecified chronicity               Plan:       Patient new onset atrial fibrillation but is currently in sinus rhythm but he is on metoprolol 25 mg twice a day and Eliquis 5 mg twice daily for anticoagulation  Patient had an  echocardiogram which showed normal LV function  Patient had history of DVT in the past and also pulm embolism recently and is treated with Eliquis.  Patient blood pressure currently stable on metoprolol  Patient has sleep apnea and uses CPAP machine  Discussed with him about weight loss and diet and exercises.

## 2025-07-25 ENCOUNTER — PATIENT ROUNDING (BHMG ONLY) (OUTPATIENT)
Dept: CARDIOLOGY | Facility: CLINIC | Age: 45
End: 2025-07-25
Payer: COMMERCIAL

## 2025-07-25 NOTE — PROGRESS NOTES
A My-Chart message has been sent to the patient for PATIENT ROUNDING with [facility] [place]

## 2025-08-28 ENCOUNTER — OFFICE (AMBULATORY)
Dept: URBAN - METROPOLITAN AREA CLINIC 64 | Facility: CLINIC | Age: 45
End: 2025-08-28
Payer: COMMERCIAL

## 2025-08-28 VITALS
HEIGHT: 72 IN | DIASTOLIC BLOOD PRESSURE: 71 MMHG | WEIGHT: 254 LBS | HEART RATE: 46 BPM | SYSTOLIC BLOOD PRESSURE: 113 MMHG

## 2025-08-28 DIAGNOSIS — E66.9 OBESITY, UNSPECIFIED: ICD-10-CM

## 2025-08-28 DIAGNOSIS — Z79.01 LONG TERM (CURRENT) USE OF ANTICOAGULANTS: ICD-10-CM

## 2025-08-28 DIAGNOSIS — K44.9 DIAPHRAGMATIC HERNIA WITHOUT OBSTRUCTION OR GANGRENE: ICD-10-CM

## 2025-08-28 DIAGNOSIS — K21.00 GASTRO-ESOPHAGEAL REFLUX DISEASE WITH ESOPHAGITIS, WITHOUT B: ICD-10-CM

## 2025-08-28 PROCEDURE — 99214 OFFICE O/P EST MOD 30 MIN: CPT | Performed by: INTERNAL MEDICINE

## (undated) DEVICE — SINGLE-USE BIOPSY FORCEPS: Brand: RADIAL JAW 4

## (undated) DEVICE — BITEBLOCK ENDO W/STRAP 60F A/ LF DISP

## (undated) DEVICE — PK ENDO GI 50